# Patient Record
Sex: FEMALE | Race: WHITE | Employment: FULL TIME | URBAN - METROPOLITAN AREA
[De-identification: names, ages, dates, MRNs, and addresses within clinical notes are randomized per-mention and may not be internally consistent; named-entity substitution may affect disease eponyms.]

---

## 2017-02-08 RX ORDER — NORETHINDRONE AND ETHINYL ESTRADIOL 7-9-5
KIT ORAL
Qty: 28 TABLET | Refills: 0 | Status: SHIPPED | OUTPATIENT
Start: 2017-02-08 | End: 2017-02-27 | Stop reason: SDUPTHER

## 2017-02-27 ENCOUNTER — OFFICE VISIT (OUTPATIENT)
Dept: FAMILY MEDICINE CLINIC | Age: 22
End: 2017-02-27

## 2017-02-27 VITALS
RESPIRATION RATE: 16 BRPM | TEMPERATURE: 98 F | BODY MASS INDEX: 30.57 KG/M2 | WEIGHT: 206.4 LBS | SYSTOLIC BLOOD PRESSURE: 110 MMHG | HEIGHT: 69 IN | HEART RATE: 84 BPM | DIASTOLIC BLOOD PRESSURE: 60 MMHG

## 2017-02-27 DIAGNOSIS — Z00.00 ANNUAL PHYSICAL EXAM: Primary | ICD-10-CM

## 2017-02-27 DIAGNOSIS — Z30.41 ORAL CONTRACEPTIVE USE: ICD-10-CM

## 2017-02-27 DIAGNOSIS — E66.09 NON MORBID OBESITY DUE TO EXCESS CALORIES: ICD-10-CM

## 2017-02-27 PROCEDURE — 99395 PREV VISIT EST AGE 18-39: CPT | Performed by: NURSE PRACTITIONER

## 2017-02-27 RX ORDER — IBUPROFEN 200 MG
200 TABLET ORAL EVERY 6 HOURS PRN
COMMUNITY
End: 2022-07-07

## 2017-02-27 ASSESSMENT — ENCOUNTER SYMPTOMS
TROUBLE SWALLOWING: 0
ANAL BLEEDING: 0
DIARRHEA: 0
ABDOMINAL PAIN: 0
COUGH: 0
VOICE CHANGE: 0
VOMITING: 0
APNEA: 0
WHEEZING: 0
BACK PAIN: 0
CONSTIPATION: 0
ABDOMINAL DISTENTION: 0
BLOOD IN STOOL: 0
NAUSEA: 0
PHOTOPHOBIA: 0
SHORTNESS OF BREATH: 0

## 2017-03-23 ENCOUNTER — TELEPHONE (OUTPATIENT)
Dept: FAMILY MEDICINE CLINIC | Age: 22
End: 2017-03-23

## 2017-03-27 ENCOUNTER — NURSE ONLY (OUTPATIENT)
Dept: FAMILY MEDICINE CLINIC | Age: 22
End: 2017-03-27

## 2017-03-27 DIAGNOSIS — Z23 NEED FOR MENINGOCOCCAL VACCINATION: ICD-10-CM

## 2017-03-27 DIAGNOSIS — Z11.1 VISIT FOR MANTOUX TEST: Primary | ICD-10-CM

## 2017-03-27 PROCEDURE — 90471 IMMUNIZATION ADMIN: CPT | Performed by: FAMILY MEDICINE

## 2017-03-27 PROCEDURE — 90734 MENACWYD/MENACWYCRM VACC IM: CPT | Performed by: FAMILY MEDICINE

## 2017-03-27 PROCEDURE — 86580 TB INTRADERMAL TEST: CPT | Performed by: FAMILY MEDICINE

## 2017-03-29 ENCOUNTER — NURSE ONLY (OUTPATIENT)
Dept: FAMILY MEDICINE CLINIC | Age: 22
End: 2017-03-29

## 2017-03-29 DIAGNOSIS — Z11.1 ENCOUNTER FOR READING OF TUBERCULIN SKIN TEST: Primary | ICD-10-CM

## 2017-03-29 LAB
INDURATION: 0
TB SKIN TEST: NEGATIVE

## 2017-04-03 ENCOUNTER — NURSE ONLY (OUTPATIENT)
Dept: FAMILY MEDICINE CLINIC | Age: 22
End: 2017-04-03

## 2017-04-03 DIAGNOSIS — Z11.1 PPD SCREENING TEST: Primary | ICD-10-CM

## 2017-04-03 PROCEDURE — 86580 TB INTRADERMAL TEST: CPT | Performed by: FAMILY MEDICINE

## 2017-04-05 ENCOUNTER — NURSE ONLY (OUTPATIENT)
Dept: FAMILY MEDICINE CLINIC | Age: 22
End: 2017-04-05

## 2017-04-05 DIAGNOSIS — Z11.1 ENCOUNTER FOR PPD SKIN TEST READING: Primary | ICD-10-CM

## 2017-04-05 LAB
INDURATION: NORMAL
TB SKIN TEST: NEGATIVE

## 2017-05-30 ENCOUNTER — TELEPHONE (OUTPATIENT)
Dept: FAMILY MEDICINE CLINIC | Age: 22
End: 2017-05-30

## 2017-05-30 ENCOUNTER — OFFICE VISIT (OUTPATIENT)
Dept: FAMILY MEDICINE CLINIC | Age: 22
End: 2017-05-30

## 2017-05-30 VITALS
SYSTOLIC BLOOD PRESSURE: 120 MMHG | TEMPERATURE: 98.2 F | WEIGHT: 204 LBS | RESPIRATION RATE: 16 BRPM | BODY MASS INDEX: 30.13 KG/M2 | DIASTOLIC BLOOD PRESSURE: 70 MMHG | HEART RATE: 84 BPM

## 2017-05-30 DIAGNOSIS — L25.9 CONTACT DERMATITIS, UNSPECIFIED CONTACT DERMATITIS TYPE, UNSPECIFIED TRIGGER: Primary | ICD-10-CM

## 2017-05-30 PROCEDURE — 99213 OFFICE O/P EST LOW 20 MIN: CPT | Performed by: FAMILY MEDICINE

## 2017-05-30 RX ORDER — PREDNISONE 10 MG/1
TABLET ORAL
Qty: 30 TABLET | Refills: 0 | Status: SHIPPED | OUTPATIENT
Start: 2017-05-30 | End: 2017-06-09

## 2017-05-30 ASSESSMENT — ENCOUNTER SYMPTOMS
CONSTIPATION: 0
VOMITING: 0
ANAL BLEEDING: 0
SHORTNESS OF BREATH: 0
ABDOMINAL PAIN: 0
DIARRHEA: 0
CHEST TIGHTNESS: 0
BLOOD IN STOOL: 0
NAUSEA: 0

## 2017-05-30 ASSESSMENT — PATIENT HEALTH QUESTIONNAIRE - PHQ9
SUM OF ALL RESPONSES TO PHQ9 QUESTIONS 1 & 2: 0
SUM OF ALL RESPONSES TO PHQ QUESTIONS 1-9: 0
1. LITTLE INTEREST OR PLEASURE IN DOING THINGS: 0
2. FEELING DOWN, DEPRESSED OR HOPELESS: 0

## 2017-07-19 ENCOUNTER — OFFICE VISIT (OUTPATIENT)
Dept: FAMILY MEDICINE CLINIC | Age: 22
End: 2017-07-19
Payer: COMMERCIAL

## 2017-07-19 VITALS
SYSTOLIC BLOOD PRESSURE: 104 MMHG | RESPIRATION RATE: 16 BRPM | HEART RATE: 76 BPM | HEIGHT: 68 IN | WEIGHT: 202 LBS | TEMPERATURE: 98.7 F | DIASTOLIC BLOOD PRESSURE: 66 MMHG | BODY MASS INDEX: 30.62 KG/M2

## 2017-07-19 DIAGNOSIS — Z11.4 SCREENING FOR HIV (HUMAN IMMUNODEFICIENCY VIRUS): ICD-10-CM

## 2017-07-19 DIAGNOSIS — Z00.00 WELL ADULT EXAM: Primary | ICD-10-CM

## 2017-07-19 DIAGNOSIS — Z01.84 IMMUNITY STATUS TESTING: ICD-10-CM

## 2017-07-19 PROBLEM — E66.3 OVERWEIGHT (BMI 25.0-29.9): Status: ACTIVE | Noted: 2017-07-19

## 2017-07-19 PROCEDURE — 99395 PREV VISIT EST AGE 18-39: CPT | Performed by: NURSE PRACTITIONER

## 2017-07-19 ASSESSMENT — ENCOUNTER SYMPTOMS
APNEA: 0
ANAL BLEEDING: 0
BACK PAIN: 0
ABDOMINAL PAIN: 0
BLOOD IN STOOL: 0
TROUBLE SWALLOWING: 0
VOMITING: 0
DIARRHEA: 0
VOICE CHANGE: 0
PHOTOPHOBIA: 0
SHORTNESS OF BREATH: 0
NAUSEA: 0
WHEEZING: 0
CONSTIPATION: 0
COUGH: 0
ABDOMINAL DISTENTION: 0

## 2017-07-20 LAB — HIV 1,2 COMBO ANTIGEN/ANTIBODY: NORMAL

## 2017-07-21 LAB — VZV IGG SER QL IA: >2000 INDEX

## 2017-10-09 ENCOUNTER — NURSE ONLY (OUTPATIENT)
Dept: FAMILY MEDICINE CLINIC | Age: 22
End: 2017-10-09
Payer: COMMERCIAL

## 2017-10-09 DIAGNOSIS — Z23 NEED FOR INFLUENZA VACCINATION: Primary | ICD-10-CM

## 2017-10-09 PROCEDURE — 90471 IMMUNIZATION ADMIN: CPT | Performed by: FAMILY MEDICINE

## 2017-10-09 PROCEDURE — 90688 IIV4 VACCINE SPLT 0.5 ML IM: CPT | Performed by: FAMILY MEDICINE

## 2017-10-09 NOTE — PROGRESS NOTES
After obtaining consent, and per orders of Pavel Castro CNP, injection of Fluzone 0.5 ml given IM left deltoid by Radhika Cobb. Patient tolerated well and left after injection.

## 2018-02-07 DIAGNOSIS — Z30.41 ORAL CONTRACEPTIVE USE: ICD-10-CM

## 2018-02-07 NOTE — TELEPHONE ENCOUNTER
I left a detailed message ok per HIPAA and notified patient of Rx at the pharmacy and ES response/recommendations. Notified of phone hours.

## 2018-06-04 ENCOUNTER — TELEPHONE (OUTPATIENT)
Dept: FAMILY MEDICINE CLINIC | Age: 23
End: 2018-06-04

## 2018-06-05 ENCOUNTER — HOSPITAL ENCOUNTER (EMERGENCY)
Age: 23
Discharge: HOME | End: 2018-06-05
Payer: COMMERCIAL

## 2018-06-05 VITALS
HEART RATE: 74 BPM | DIASTOLIC BLOOD PRESSURE: 90 MMHG | RESPIRATION RATE: 18 BRPM | TEMPERATURE: 98.3 F | BODY MASS INDEX: 28.63 KG/M2 | OXYGEN SATURATION: 98 % | WEIGHT: 200 LBS | SYSTOLIC BLOOD PRESSURE: 141 MMHG | HEIGHT: 70 IN

## 2018-06-05 DIAGNOSIS — N76.0 ACUTE VAGINITIS: Primary | ICD-10-CM

## 2018-06-05 LAB
CHLAMYDIA TRACHOMATIS BY RT-PCR: NOT DETECTED
CT/NG SOURCE: NORMAL
NEISSERIA GONORRHOEAE BY RT-PCR: NOT DETECTED
TRICHOMONAS PREP: NEGATIVE

## 2018-06-05 PROCEDURE — 87491 CHLMYD TRACH DNA AMP PROBE: CPT

## 2018-06-05 PROCEDURE — 99213 OFFICE O/P EST LOW 20 MIN: CPT | Performed by: NURSE PRACTITIONER

## 2018-06-05 PROCEDURE — 87070 CULTURE OTHR SPECIMN AEROBIC: CPT

## 2018-06-05 PROCEDURE — 87591 N.GONORRHOEAE DNA AMP PROB: CPT

## 2018-06-05 PROCEDURE — 87205 SMEAR GRAM STAIN: CPT

## 2018-06-05 PROCEDURE — 99213 OFFICE O/P EST LOW 20 MIN: CPT

## 2018-06-05 PROCEDURE — 87808 TRICHOMONAS ASSAY W/OPTIC: CPT

## 2018-06-05 RX ORDER — FLUCONAZOLE 150 MG/1
150 TABLET ORAL ONCE
Qty: 1 TABLET | Refills: 0 | Status: SHIPPED | OUTPATIENT
Start: 2018-06-05 | End: 2018-06-05

## 2018-06-05 ASSESSMENT — ENCOUNTER SYMPTOMS
NAUSEA: 0
VOMITING: 0
ABDOMINAL PAIN: 0

## 2018-06-08 LAB
GENITAL CULTURE, ROUTINE: ABNORMAL
GENITAL CULTURE, ROUTINE: ABNORMAL
GRAM STAIN RESULT: ABNORMAL
ORGANISM: ABNORMAL

## 2018-06-28 DIAGNOSIS — Z30.41 ORAL CONTRACEPTIVE USE: ICD-10-CM

## 2018-06-28 RX ORDER — NORETHINDRONE AND ETHINYL ESTRADIOL 7-9-5
KIT ORAL
Qty: 28 TABLET | Refills: 0 | Status: SHIPPED | OUTPATIENT
Start: 2018-06-28 | End: 2018-07-02 | Stop reason: SDUPTHER

## 2018-07-02 ENCOUNTER — OFFICE VISIT (OUTPATIENT)
Dept: FAMILY MEDICINE CLINIC | Age: 23
End: 2018-07-02
Payer: COMMERCIAL

## 2018-07-02 VITALS
SYSTOLIC BLOOD PRESSURE: 136 MMHG | DIASTOLIC BLOOD PRESSURE: 82 MMHG | BODY MASS INDEX: 30.98 KG/M2 | HEART RATE: 80 BPM | TEMPERATURE: 98.5 F | HEIGHT: 70 IN | WEIGHT: 216.4 LBS | RESPIRATION RATE: 16 BRPM

## 2018-07-02 DIAGNOSIS — Z00.00 ENCOUNTER FOR WELLNESS EXAMINATION: Primary | ICD-10-CM

## 2018-07-02 DIAGNOSIS — Z30.41 ORAL CONTRACEPTIVE USE: ICD-10-CM

## 2018-07-02 PROCEDURE — 99395 PREV VISIT EST AGE 18-39: CPT | Performed by: NURSE PRACTITIONER

## 2018-07-02 ASSESSMENT — ENCOUNTER SYMPTOMS
ABDOMINAL PAIN: 0
SORE THROAT: 0
CONSTIPATION: 0
SHORTNESS OF BREATH: 0
DIARRHEA: 0
WHEEZING: 0
COUGH: 0
NAUSEA: 0

## 2018-07-02 ASSESSMENT — PATIENT HEALTH QUESTIONNAIRE - PHQ9
2. FEELING DOWN, DEPRESSED OR HOPELESS: 0
SUM OF ALL RESPONSES TO PHQ QUESTIONS 1-9: 0
1. LITTLE INTEREST OR PLEASURE IN DOING THINGS: 0
SUM OF ALL RESPONSES TO PHQ9 QUESTIONS 1 & 2: 0

## 2018-07-02 NOTE — PATIENT INSTRUCTIONS
are the disadvantages of combination pills? · You have to take a pill at the same time every day to prevent pregnancy. · Combination pills don't protect against sexually transmitted infections (STIs), such as herpes or HIV/AIDS. If you aren't sure if your sex partner might have an STI, use a condom to protect against disease. · They may cause changes in your period. You may have little bleeding, skipped periods, or spotting. · They may cause mood changes, less interest in sex, or weight gain. · Combination pills contain estrogen. They may not be right for you if you have certain health problems. Where can you learn more? Go to https://chpepiceweb.health-[email or web address]. org and sign in to your [employer] account. Enter G610 in the [employer] box to learn more about \"Learning About Birth Control: Combination Pills. \"     If you do not have an account, please click on the \"Sign Up Now\" link. Current as of: November 21, 2017  Content Version: 11.6  © 5505-0726 [employer]. Care instructions adapted under license by [facility] ([facility]). If you have questions about a medical condition or this instruction, always ask your healthcare professional. [place] [address] any warranty or liability for your use of this information. Patient Education        Well Visit, Ages 25 to 48: Care Instructions  Your Care Instructions    Physical exams can help you stay healthy. Your doctor has checked your overall health and may have suggested ways to take good care of yourself. He or she also may have recommended tests. At home, you can help prevent illness with healthy eating, regular exercise, and other steps. Follow-up care is a key part of your treatment and safety. Be sure to make and go to all appointments, and call your doctor if you are having problems. It's also a good idea to know your test results and keep a list of the medicines you take. How can you care for yourself at home?   · Reach for help? Watch closely for changes in your health, and be sure to contact your doctor if you have any problems or symptoms that concern you. Where can you learn more? Go to [email or web address] org and sign in to your [employer] account. Enter P072 in the [employer] box to learn more about \"Well Visit, Ages 25 to 48: Care Instructions. \"     If you do not have an account, please click on the \"Sign Up Now\" link. Current as of: May 16, 2017  Content Version: 11.6  © 0588-0993 [employer]. Care instructions adapted under license by [facility] ([facility]). If you have questions about a medical condition or this instruction, always ask your healthcare professional. [address] any warranty or liability for your use of this information.

## 2018-07-02 NOTE — PROGRESS NOTES
[facility]  [facility]  Dept: [phone]  Dept Fax: [phone]    DERRICK Montgomery is a 23 y. o.female here for annual exam and contraceptive refill. She was at the urgent care about 3 weeks ago for an episode of vaginitis which was treated. She states she has completely cleared her symptoms. 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 Her relationships are good, she is positive and hopeful about good employment after school. Pt feeling ok since last visit- no new complaints, issues, or problems, except as noted below:     Patient Active Problem List   Diagnosis    Overweight (BMI 25.0-29. 9)       Current Outpatient Prescriptions   Medication Sig Dispense Refill    Norethin-Eth Estrad Triphasic (BILLY) 0.5/1/0.5-35 MG-MCG TABS TAKE 1 TABLET BY MOUTH EVERY DAY 28 tablet 11    ibuprofen (ADVIL;MOTRIN) 200 MG tablet Take 200 mg by mouth every 6 hours as needed for Pain       No current facility-administered medications for this visit. Review of Systems   Constitutional: Negative for appetite change, diaphoresis, fatigue and fever. HENT: Negative for congestion, sore throat and tinnitus. Eyes: Negative for visual disturbance. Respiratory: Negative for cough, shortness of breath and wheezing. Cardiovascular: Negative for chest pain and leg swelling. Gastrointestinal: Negative for abdominal pain, constipation, diarrhea and nausea. Genitourinary: Negative for frequency. Musculoskeletal: Negative for arthralgias, myalgias and neck stiffness. Skin: Negative for rash. Neurological: Negative for dizziness, weakness and headaches. Psychiatric/Behavioral: The patient is not nervous/anxious. All other systems reviewed and are negative.           OBJECTIVE     /82 (Site: Left Arm, Position: Sitting, Cuff Size: Medium Adult)   Pulse 80   Temp 98.5 °F (36.9 °C) (Oral)   Resp 16   Ht 5' 10\" (1.778 m) Wt 216 lb 6.4 oz (98.2 kg)   LMP 06/27/2018   BMI 31.05 kg/m²     Wt Readings from Last 3 Encounters:   07/02/18 216 lb 6.4 oz (98.2 kg)   06/05/18 200 lb (90.7 kg)   07/19/17 202 lb (91.6 kg) (98 %, Z= 1.97)*     * Growth percentiles are based on [facility] 2-20 Years data. Body mass index is 31.05 kg/m². Physical Exam   Constitutional: She is oriented to person, place, and time. She appears well-developed and well-nourished. She is cooperative. No distress. HENT:   Right Ear: Hearing, tympanic membrane, external ear and ear canal normal.   Left Ear: Hearing, tympanic membrane, external ear and ear canal normal.   Nose: Nose normal. No mucosal edema or rhinorrhea. Mouth/Throat: Uvula is midline, oropharynx is clear and moist and mucous membranes are normal. No uvula swelling. No oropharyngeal exudate, posterior oropharyngeal edema or posterior oropharyngeal erythema. Eyes: Conjunctivae, EOM and lids are normal. Pupils are equal, round, and reactive to light. Right eye exhibits no discharge. Left eye exhibits no discharge. Neck: Full passive range of motion without pain. No muscular tenderness present. Cardiovascular: Normal rate, regular rhythm and normal heart sounds. Pulmonary/Chest: Effort normal and breath sounds normal. No accessory muscle usage. No respiratory distress. She has no wheezes. She has no rales. Lymphadenopathy:     She has no cervical adenopathy. Neurological: She is alert and oriented to person, place, and time. She has normal strength. No cranial nerve deficit. Coordination and gait normal. GCS eye subscore is 4. GCS verbal subscore is 5. GCS motor subscore is 6. Skin: Skin is warm and dry. She is not diaphoretic. Psychiatric: She has a normal mood and affect. Her speech is normal and behavior is normal. Judgment and thought content normal. Cognition and memory are normal.   Nursing note and vitals reviewed.       No results found for: LABA1C    No results found for: Center   7/2/2018 4:00 PM ALEXANDRE Cloud CNP [facility] [address] [facility]       ASSESSMENT      Patient exam normal today. Declines Pap smear today offered as she is 2 months from her 23st birthday. She states she will come back later for that. Diagnosis Orders   1. Encounter for wellness examination     2. Oral contraceptive use  Norethin-Eth Estrad Triphasic (BILLY) 0.5/1/0.5-35 MG-MCG TABS       PLAN      1. Prescription for birth control pills refilled. 2.  Return in 3 months for pelvic exam and Pap smear.     Electronically signed by ALEXANDRE Cloud CNP on 7/2/2018 at 3:27 PM

## 2018-07-28 DIAGNOSIS — Z30.41 ORAL CONTRACEPTIVE USE: ICD-10-CM

## 2018-07-30 RX ORDER — NORETHINDRONE AND ETHINYL ESTRADIOL 7-9-5
KIT ORAL
Qty: 84 TABLET | Refills: 0 | OUTPATIENT
Start: 2018-07-30

## 2019-01-08 ENCOUNTER — OFFICE VISIT (OUTPATIENT)
Dept: FAMILY MEDICINE CLINIC | Age: 24
End: 2019-01-08
Payer: COMMERCIAL

## 2019-01-08 VITALS
SYSTOLIC BLOOD PRESSURE: 112 MMHG | RESPIRATION RATE: 16 BRPM | WEIGHT: 207 LBS | HEART RATE: 72 BPM | HEIGHT: 69 IN | BODY MASS INDEX: 30.66 KG/M2 | TEMPERATURE: 98.6 F | DIASTOLIC BLOOD PRESSURE: 82 MMHG

## 2019-01-08 DIAGNOSIS — K52.9 CHRONIC DIARRHEA: Primary | ICD-10-CM

## 2019-01-08 PROCEDURE — 99213 OFFICE O/P EST LOW 20 MIN: CPT | Performed by: NURSE PRACTITIONER

## 2019-01-08 ASSESSMENT — ENCOUNTER SYMPTOMS
CONSTIPATION: 0
COUGH: 0
WHEEZING: 0
DIARRHEA: 1
SORE THROAT: 0
ABDOMINAL PAIN: 1
NAUSEA: 0
SHORTNESS OF BREATH: 0

## 2019-02-08 ENCOUNTER — TELEPHONE (OUTPATIENT)
Dept: FAMILY MEDICINE CLINIC | Age: 24
End: 2019-02-08

## 2019-04-12 ENCOUNTER — HOSPITAL ENCOUNTER (OUTPATIENT)
Dept: NUCLEAR MEDICINE | Age: 24
Discharge: HOME | End: 2019-04-12
Payer: COMMERCIAL

## 2019-04-12 ENCOUNTER — HOSPITAL ENCOUNTER (OUTPATIENT)
Dept: ULTRASOUND IMAGING | Age: 24
Discharge: HOME | End: 2019-04-12
Payer: COMMERCIAL

## 2019-04-12 VITALS — WEIGHT: 200 LBS | BODY MASS INDEX: 28.63 KG/M2 | HEIGHT: 70 IN

## 2019-04-12 DIAGNOSIS — R10.84 ABDOMINAL PAIN, GENERALIZED: ICD-10-CM

## 2019-04-12 DIAGNOSIS — R10.11 RIGHT UPPER QUADRANT ABDOMINAL PAIN: ICD-10-CM

## 2019-04-12 DIAGNOSIS — R19.7 DIARRHEA, UNSPECIFIED TYPE: ICD-10-CM

## 2019-04-12 PROCEDURE — 6360000004 HC RX CONTRAST MEDICATION: Performed by: RADIOLOGY

## 2019-04-12 PROCEDURE — 76705 ECHO EXAM OF ABDOMEN: CPT

## 2019-04-12 PROCEDURE — 3430000000 HC RX DIAGNOSTIC RADIOPHARMACEUTICAL: Performed by: NURSE PRACTITIONER

## 2019-04-12 PROCEDURE — 2709999900 HC NON-CHARGEABLE SUPPLY

## 2019-04-12 PROCEDURE — 2580000003 HC RX 258: Performed by: RADIOLOGY

## 2019-04-12 PROCEDURE — A9537 TC99M MEBROFENIN: HCPCS | Performed by: NURSE PRACTITIONER

## 2019-04-12 PROCEDURE — 78227 HEPATOBIL SYST IMAGE W/DRUG: CPT

## 2019-04-12 RX ADMIN — Medication 8.5 MILLICURIE: at 09:07

## 2019-04-12 RX ADMIN — SODIUM CHLORIDE 1.81 MCG: 9 INJECTION, SOLUTION INTRAVENOUS at 10:07

## 2019-06-26 DIAGNOSIS — Z30.41 ORAL CONTRACEPTIVE USE: ICD-10-CM

## 2019-12-14 DIAGNOSIS — Z30.41 ORAL CONTRACEPTIVE USE: ICD-10-CM

## 2019-12-16 DIAGNOSIS — Z30.41 ORAL CONTRACEPTIVE USE: ICD-10-CM

## 2020-01-14 NOTE — TELEPHONE ENCOUNTER
Julio Cesar Thakkar calls confused about her medication. She said she usually gets this from her pharmacy to start on Monday but the last 3 months they never have it ready for her until Tuesdays so she is always a day behind taking her birth control. She is asking if the refill they requested can be submitted because she is out and then she did make an appointment with Pat Baxter for Monday 1/20/20 to review everything. Please advise.     DOLWILLI  1/8/19 (MyChart visit)  RAVEN  1/20/20 Emily Montgomery    [place] [facility] [address]

## 2020-01-20 ENCOUNTER — OFFICE VISIT (OUTPATIENT)
Dept: FAMILY MEDICINE CLINIC | Age: 25
End: 2020-01-20
Payer: COMMERCIAL

## 2020-01-20 VITALS
WEIGHT: 180 LBS | RESPIRATION RATE: 16 BRPM | HEIGHT: 69 IN | HEART RATE: 64 BPM | TEMPERATURE: 98.2 F | BODY MASS INDEX: 26.66 KG/M2 | SYSTOLIC BLOOD PRESSURE: 104 MMHG | DIASTOLIC BLOOD PRESSURE: 80 MMHG

## 2020-01-20 PROCEDURE — 99213 OFFICE O/P EST LOW 20 MIN: CPT | Performed by: NURSE PRACTITIONER

## 2020-01-20 ASSESSMENT — ENCOUNTER SYMPTOMS
SHORTNESS OF BREATH: 0
BLOOD IN STOOL: 0
NAUSEA: 0
VOMITING: 0
DIARRHEA: 0
CONSTIPATION: 0

## 2020-01-20 ASSESSMENT — PATIENT HEALTH QUESTIONNAIRE - PHQ9
1. LITTLE INTEREST OR PLEASURE IN DOING THINGS: 0
2. FEELING DOWN, DEPRESSED OR HOPELESS: 0
SUM OF ALL RESPONSES TO PHQ QUESTIONS 1-9: 0
SUM OF ALL RESPONSES TO PHQ9 QUESTIONS 1 & 2: 0
SUM OF ALL RESPONSES TO PHQ QUESTIONS 1-9: 0

## 2020-01-20 NOTE — PROGRESS NOTES
Chief Complaint   Patient presents with    Medication Refill     Here today for refill of birth control. No concerns. Jessica Cantu is a 27 y. o.female      Pt is here today for birth control refill. States she is doing well with her current birth control pill and denies any concerns. Cycles are regular with bleeding about 4 days. Denies any cramping. Follows with obgyn for pap/pelvic per patient. Review of Systems   Constitutional: Negative for chills, diaphoresis and fever. Respiratory: Negative for shortness of breath. Cardiovascular: Negative for chest pain, palpitations and leg swelling. Gastrointestinal: Negative for blood in stool, constipation, diarrhea, nausea and vomiting. Genitourinary: Negative for dysuria and hematuria. Musculoskeletal: Negative for myalgias. Neurological: Negative for dizziness and headaches. All other systems reviewed and are negative. OBJECTIVE     /80 (Site: Left Upper Arm)   Pulse 64   Temp 98.2 °F (36.8 °C) (Oral)   Resp 16   Ht 5' 9\" (1.753 m)   Wt 180 lb (81.6 kg)   BMI 26.58 kg/m²     Physical Exam  Vitals signs and nursing note reviewed. Constitutional:       Appearance: She is well-developed. HENT:      Head: Normocephalic and atraumatic. Right Ear: External ear normal.      Left Ear: External ear normal.      Nose: Nose normal.   Eyes:      Conjunctiva/sclera: Conjunctivae normal.      Pupils: Pupils are equal, round, and reactive to light. Neck:      Musculoskeletal: Normal range of motion and neck supple. Cardiovascular:      Rate and Rhythm: Normal rate and regular rhythm. Heart sounds: Normal heart sounds. Pulmonary:      Effort: Pulmonary effort is normal.      Breath sounds: Normal breath sounds. Abdominal:      General: Bowel sounds are normal.      Palpations: Abdomen is soft. Musculoskeletal: Normal range of motion. Skin:     General: Skin is warm and dry.    Neurological: Mental Status: She is alert and oriented to person, place, and time. Deep Tendon Reflexes: Reflexes are normal and symmetric. Psychiatric:         Behavior: Behavior normal.         Thought Content: Thought content normal.         Judgment: Judgment normal.           No results found for this visit on 01/20/20. ASSESSMENT      1. Oral contraceptive use        PLAN     Requested Prescriptions     Signed Prescriptions Disp Refills    Norethin-Eth Estrad Triphasic (ARANELLE) 0.5/1/0.5-35 MG-MCG TABS 28 tablet 11     Sig: TAKE 1 TABLET BY MOUTH EVERY DAY     - Follows with Blair Mcintyre for obgyn - Pt to have appt for pelvic. States last pap was completed around her 23st birthday.   - Follow up annually and prn    No orders of the defined types were placed in this encounter.         Electronically signed by ALEXANDRE Loev CNP on 1/20/2020 at 9:18 AM

## 2021-02-14 ENCOUNTER — ANESTHESIA EVENT (OUTPATIENT)
Dept: OPERATING ROOM | Age: 26
End: 2021-02-14
Payer: COMMERCIAL

## 2021-02-14 ENCOUNTER — APPOINTMENT (OUTPATIENT)
Dept: CT IMAGING | Age: 26
End: 2021-02-14
Payer: COMMERCIAL

## 2021-02-14 ENCOUNTER — HOSPITAL ENCOUNTER (OUTPATIENT)
Age: 26
Discharge: HOME | End: 2021-02-14
Attending: OBSTETRICS & GYNECOLOGY | Admitting: OBSTETRICS & GYNECOLOGY
Payer: COMMERCIAL

## 2021-02-14 ENCOUNTER — ANESTHESIA (OUTPATIENT)
Dept: OPERATING ROOM | Age: 26
End: 2021-02-14
Payer: COMMERCIAL

## 2021-02-14 ENCOUNTER — APPOINTMENT (OUTPATIENT)
Dept: ULTRASOUND IMAGING | Age: 26
End: 2021-02-14
Payer: COMMERCIAL

## 2021-02-14 VITALS
HEIGHT: 70 IN | SYSTOLIC BLOOD PRESSURE: 119 MMHG | TEMPERATURE: 98.6 F | BODY MASS INDEX: 27.2 KG/M2 | OXYGEN SATURATION: 99 % | DIASTOLIC BLOOD PRESSURE: 78 MMHG | WEIGHT: 190 LBS | HEART RATE: 83 BPM | RESPIRATION RATE: 16 BRPM

## 2021-02-14 VITALS — DIASTOLIC BLOOD PRESSURE: 57 MMHG | OXYGEN SATURATION: 100 % | TEMPERATURE: 97.5 F | SYSTOLIC BLOOD PRESSURE: 99 MMHG

## 2021-02-14 DIAGNOSIS — Z98.890 S/P LAPAROSCOPY: ICD-10-CM

## 2021-02-14 DIAGNOSIS — R10.2 PELVIC PAIN: Primary | ICD-10-CM

## 2021-02-14 PROBLEM — R10.32 LEFT LOWER QUADRANT PAIN: Status: ACTIVE | Noted: 2021-02-14

## 2021-02-14 PROBLEM — N83.519 OVARIAN TORSION: Status: ACTIVE | Noted: 2021-02-14

## 2021-02-14 PROBLEM — N83.202 LEFT OVARIAN CYST: Status: ACTIVE | Noted: 2021-02-14

## 2021-02-14 LAB
ALBUMIN SERPL-MCNC: 4 G/DL (ref 3.5–5.1)
ALP BLD-CCNC: 56 U/L (ref 38–126)
ALT SERPL-CCNC: 18 U/L (ref 11–66)
ANION GAP SERPL CALCULATED.3IONS-SCNC: 7 MEQ/L (ref 8–16)
AST SERPL-CCNC: 22 U/L (ref 5–40)
BASOPHILS # BLD: 0.9 %
BASOPHILS ABSOLUTE: 0.1 THOU/MM3 (ref 0–0.1)
BILIRUB SERPL-MCNC: 0.4 MG/DL (ref 0.3–1.2)
BILIRUBIN DIRECT: < 0.2 MG/DL (ref 0–0.3)
BILIRUBIN URINE: NEGATIVE
BLOOD, URINE: NEGATIVE
BUN BLDV-MCNC: 12 MG/DL (ref 7–22)
CALCIUM SERPL-MCNC: 8.9 MG/DL (ref 8.5–10.5)
CHARACTER, URINE: CLEAR
CHLORIDE BLD-SCNC: 105 MEQ/L (ref 98–111)
CO2: 23 MEQ/L (ref 23–33)
COLOR: YELLOW
CREAT SERPL-MCNC: 0.8 MG/DL (ref 0.4–1.2)
EOSINOPHIL # BLD: 2.5 %
EOSINOPHILS ABSOLUTE: 0.2 THOU/MM3 (ref 0–0.4)
ERYTHROCYTE [DISTWIDTH] IN BLOOD BY AUTOMATED COUNT: 12.6 % (ref 11.5–14.5)
ERYTHROCYTE [DISTWIDTH] IN BLOOD BY AUTOMATED COUNT: 40 FL (ref 35–45)
GFR SERPL CREATININE-BSD FRML MDRD: 89 ML/MIN/1.73M2
GLUCOSE BLD-MCNC: 86 MG/DL (ref 70–108)
GLUCOSE URINE: NEGATIVE MG/DL
HCT VFR BLD CALC: 42.4 % (ref 37–47)
HEMOGLOBIN: 14.6 GM/DL (ref 12–16)
IMMATURE GRANS (ABS): 0.03 THOU/MM3 (ref 0–0.07)
IMMATURE GRANULOCYTES: 0.4 %
KETONES, URINE: NEGATIVE
LEUKOCYTE ESTERASE, URINE: NEGATIVE
LIPASE: 38.6 U/L (ref 5.6–51.3)
LYMPHOCYTES # BLD: 23 %
LYMPHOCYTES ABSOLUTE: 1.6 THOU/MM3 (ref 1–4.8)
MAGNESIUM: 2 MG/DL (ref 1.6–2.4)
MCH RBC QN AUTO: 29.9 PG (ref 26–33)
MCHC RBC AUTO-ENTMCNC: 34.4 GM/DL (ref 32.2–35.5)
MCV RBC AUTO: 86.9 FL (ref 81–99)
MONOCYTES # BLD: 6.2 %
MONOCYTES ABSOLUTE: 0.4 THOU/MM3 (ref 0.4–1.3)
NITRITE, URINE: NEGATIVE
NUCLEATED RED BLOOD CELLS: 0 /100 WBC
OSMOLALITY CALCULATION: 269.2 MOSMOL/KG (ref 275–300)
PH UA: 6.5 (ref 5–9)
PLATELET # BLD: 234 THOU/MM3 (ref 130–400)
PMV BLD AUTO: 11 FL (ref 9.4–12.4)
POTASSIUM SERPL-SCNC: 4.7 MEQ/L (ref 3.5–5.2)
PREGNANCY, SERUM: NEGATIVE
PROTEIN UA: NEGATIVE
RBC # BLD: 4.88 MILL/MM3 (ref 4.2–5.4)
SARS-COV-2, NAAT: NOT DETECTED
SEG NEUTROPHILS: 67 %
SEGMENTED NEUTROPHILS ABSOLUTE COUNT: 4.6 THOU/MM3 (ref 1.8–7.7)
SODIUM BLD-SCNC: 135 MEQ/L (ref 135–145)
SPECIFIC GRAVITY, URINE: 1.02 (ref 1–1.03)
TOTAL PROTEIN: 7.4 G/DL (ref 6.1–8)
UROBILINOGEN, URINE: 0.2 EU/DL (ref 0–1)
WBC # BLD: 6.8 THOU/MM3 (ref 4.8–10.8)

## 2021-02-14 PROCEDURE — 2580000003 HC RX 258: Performed by: REGISTERED NURSE

## 2021-02-14 PROCEDURE — 6360000002 HC RX W HCPCS: Performed by: REGISTERED NURSE

## 2021-02-14 PROCEDURE — 3700000000 HC ANESTHESIA ATTENDED CARE: Performed by: OBSTETRICS & GYNECOLOGY

## 2021-02-14 PROCEDURE — 36415 COLL VENOUS BLD VENIPUNCTURE: CPT

## 2021-02-14 PROCEDURE — 94760 N-INVAS EAR/PLS OXIMETRY 1: CPT

## 2021-02-14 PROCEDURE — 74176 CT ABD & PELVIS W/O CONTRAST: CPT

## 2021-02-14 PROCEDURE — 2500000003 HC RX 250 WO HCPCS: Performed by: REGISTERED NURSE

## 2021-02-14 PROCEDURE — 6360000002 HC RX W HCPCS: Performed by: ANESTHESIOLOGY

## 2021-02-14 PROCEDURE — 96374 THER/PROPH/DIAG INJ IV PUSH: CPT

## 2021-02-14 PROCEDURE — 93975 VASCULAR STUDY: CPT

## 2021-02-14 PROCEDURE — 81003 URINALYSIS AUTO W/O SCOPE: CPT

## 2021-02-14 PROCEDURE — 82248 BILIRUBIN DIRECT: CPT

## 2021-02-14 PROCEDURE — 76830 TRANSVAGINAL US NON-OB: CPT

## 2021-02-14 PROCEDURE — 80053 COMPREHEN METABOLIC PANEL: CPT

## 2021-02-14 PROCEDURE — 2580000003 HC RX 258: Performed by: PHYSICIAN ASSISTANT

## 2021-02-14 PROCEDURE — 85025 COMPLETE CBC W/AUTO DIFF WBC: CPT

## 2021-02-14 PROCEDURE — 3700000001 HC ADD 15 MINUTES (ANESTHESIA): Performed by: OBSTETRICS & GYNECOLOGY

## 2021-02-14 PROCEDURE — 2500000003 HC RX 250 WO HCPCS: Performed by: OBSTETRICS & GYNECOLOGY

## 2021-02-14 PROCEDURE — 3600000004 HC SURGERY LEVEL 4 BASE: Performed by: OBSTETRICS & GYNECOLOGY

## 2021-02-14 PROCEDURE — 96375 TX/PRO/DX INJ NEW DRUG ADDON: CPT

## 2021-02-14 PROCEDURE — 6360000002 HC RX W HCPCS: Performed by: PHYSICIAN ASSISTANT

## 2021-02-14 PROCEDURE — 7100000001 HC PACU RECOVERY - ADDTL 15 MIN: Performed by: OBSTETRICS & GYNECOLOGY

## 2021-02-14 PROCEDURE — 83690 ASSAY OF LIPASE: CPT

## 2021-02-14 PROCEDURE — 96376 TX/PRO/DX INJ SAME DRUG ADON: CPT

## 2021-02-14 PROCEDURE — 2709999900 HC NON-CHARGEABLE SUPPLY: Performed by: OBSTETRICS & GYNECOLOGY

## 2021-02-14 PROCEDURE — U0002 COVID-19 LAB TEST NON-CDC: HCPCS

## 2021-02-14 PROCEDURE — 6370000000 HC RX 637 (ALT 250 FOR IP): Performed by: OBSTETRICS & GYNECOLOGY

## 2021-02-14 PROCEDURE — 3600000014 HC SURGERY LEVEL 4 ADDTL 15MIN: Performed by: OBSTETRICS & GYNECOLOGY

## 2021-02-14 PROCEDURE — 99285 EMERGENCY DEPT VISIT HI MDM: CPT

## 2021-02-14 PROCEDURE — 7100000000 HC PACU RECOVERY - FIRST 15 MIN: Performed by: OBSTETRICS & GYNECOLOGY

## 2021-02-14 PROCEDURE — 83735 ASSAY OF MAGNESIUM: CPT

## 2021-02-14 PROCEDURE — 84703 CHORIONIC GONADOTROPIN ASSAY: CPT

## 2021-02-14 RX ORDER — SUCCINYLCHOLINE/SOD CL,ISO/PF 200MG/10ML
SYRINGE (ML) INTRAVENOUS PRN
Status: DISCONTINUED | OUTPATIENT
Start: 2021-02-14 | End: 2021-02-14 | Stop reason: SDUPTHER

## 2021-02-14 RX ORDER — PROMETHAZINE HYDROCHLORIDE 25 MG/ML
12.5 INJECTION, SOLUTION INTRAMUSCULAR; INTRAVENOUS
Status: DISCONTINUED | OUTPATIENT
Start: 2021-02-14 | End: 2021-02-14 | Stop reason: HOSPADM

## 2021-02-14 RX ORDER — PROPOFOL 10 MG/ML
INJECTION, EMULSION INTRAVENOUS PRN
Status: DISCONTINUED | OUTPATIENT
Start: 2021-02-14 | End: 2021-02-14 | Stop reason: SDUPTHER

## 2021-02-14 RX ORDER — SODIUM CHLORIDE 0.9 % (FLUSH) 0.9 %
10 SYRINGE (ML) INJECTION PRN
Status: DISCONTINUED | OUTPATIENT
Start: 2021-02-14 | End: 2021-02-14 | Stop reason: HOSPADM

## 2021-02-14 RX ORDER — FENTANYL CITRATE 50 UG/ML
50 INJECTION, SOLUTION INTRAMUSCULAR; INTRAVENOUS EVERY 5 MIN PRN
Status: DISCONTINUED | OUTPATIENT
Start: 2021-02-14 | End: 2021-02-14 | Stop reason: HOSPADM

## 2021-02-14 RX ORDER — IBUPROFEN 800 MG/1
800 TABLET ORAL EVERY 8 HOURS PRN
Status: DISCONTINUED | OUTPATIENT
Start: 2021-02-14 | End: 2021-02-14 | Stop reason: HOSPADM

## 2021-02-14 RX ORDER — OXYCODONE HYDROCHLORIDE AND ACETAMINOPHEN 5; 325 MG/1; MG/1
2 TABLET ORAL EVERY 4 HOURS PRN
Status: DISCONTINUED | OUTPATIENT
Start: 2021-02-14 | End: 2021-02-14 | Stop reason: HOSPADM

## 2021-02-14 RX ORDER — NEOSTIGMINE METHYLSULFATE 5 MG/5 ML
SYRINGE (ML) INTRAVENOUS PRN
Status: DISCONTINUED | OUTPATIENT
Start: 2021-02-14 | End: 2021-02-14 | Stop reason: SDUPTHER

## 2021-02-14 RX ORDER — KETOROLAC TROMETHAMINE 30 MG/ML
30 INJECTION, SOLUTION INTRAMUSCULAR; INTRAVENOUS ONCE
Status: COMPLETED | OUTPATIENT
Start: 2021-02-14 | End: 2021-02-14

## 2021-02-14 RX ORDER — ONDANSETRON 2 MG/ML
4 INJECTION INTRAMUSCULAR; INTRAVENOUS ONCE
Status: COMPLETED | OUTPATIENT
Start: 2021-02-14 | End: 2021-02-14

## 2021-02-14 RX ORDER — GLYCOPYRROLATE 1 MG/5 ML
SYRINGE (ML) INTRAVENOUS PRN
Status: DISCONTINUED | OUTPATIENT
Start: 2021-02-14 | End: 2021-02-14 | Stop reason: SDUPTHER

## 2021-02-14 RX ORDER — ONDANSETRON 2 MG/ML
INJECTION INTRAMUSCULAR; INTRAVENOUS PRN
Status: DISCONTINUED | OUTPATIENT
Start: 2021-02-14 | End: 2021-02-14 | Stop reason: SDUPTHER

## 2021-02-14 RX ORDER — MORPHINE SULFATE 4 MG/ML
4 INJECTION, SOLUTION INTRAMUSCULAR; INTRAVENOUS ONCE
Status: COMPLETED | OUTPATIENT
Start: 2021-02-14 | End: 2021-02-14

## 2021-02-14 RX ORDER — OXYCODONE HYDROCHLORIDE AND ACETAMINOPHEN 5; 325 MG/1; MG/1
1 TABLET ORAL EVERY 4 HOURS PRN
Status: DISCONTINUED | OUTPATIENT
Start: 2021-02-14 | End: 2021-02-14 | Stop reason: HOSPADM

## 2021-02-14 RX ORDER — LIDOCAINE HCL/PF 100 MG/5ML
SYRINGE (ML) INJECTION PRN
Status: DISCONTINUED | OUTPATIENT
Start: 2021-02-14 | End: 2021-02-14 | Stop reason: SDUPTHER

## 2021-02-14 RX ORDER — HYDROCODONE BITARTRATE AND ACETAMINOPHEN 5; 325 MG/1; MG/1
1 TABLET ORAL EVERY 6 HOURS PRN
Qty: 18 TABLET | Refills: 0 | Status: SHIPPED | OUTPATIENT
Start: 2021-02-14 | End: 2021-02-19

## 2021-02-14 RX ORDER — ROCURONIUM BROMIDE 10 MG/ML
INJECTION, SOLUTION INTRAVENOUS PRN
Status: DISCONTINUED | OUTPATIENT
Start: 2021-02-14 | End: 2021-02-14 | Stop reason: SDUPTHER

## 2021-02-14 RX ORDER — SODIUM CHLORIDE 0.9 % (FLUSH) 0.9 %
10 SYRINGE (ML) INJECTION EVERY 12 HOURS SCHEDULED
Status: DISCONTINUED | OUTPATIENT
Start: 2021-02-14 | End: 2021-02-14 | Stop reason: HOSPADM

## 2021-02-14 RX ORDER — BUPIVACAINE HYDROCHLORIDE 5 MG/ML
INJECTION, SOLUTION EPIDURAL; INTRACAUDAL PRN
Status: DISCONTINUED | OUTPATIENT
Start: 2021-02-14 | End: 2021-02-14 | Stop reason: ALTCHOICE

## 2021-02-14 RX ORDER — DIPHENHYDRAMINE HYDROCHLORIDE 50 MG/ML
INJECTION INTRAMUSCULAR; INTRAVENOUS PRN
Status: DISCONTINUED | OUTPATIENT
Start: 2021-02-14 | End: 2021-02-14 | Stop reason: SDUPTHER

## 2021-02-14 RX ORDER — 0.9 % SODIUM CHLORIDE 0.9 %
1000 INTRAVENOUS SOLUTION INTRAVENOUS ONCE
Status: COMPLETED | OUTPATIENT
Start: 2021-02-14 | End: 2021-02-14

## 2021-02-14 RX ORDER — MIDAZOLAM HYDROCHLORIDE 1 MG/ML
INJECTION INTRAMUSCULAR; INTRAVENOUS PRN
Status: DISCONTINUED | OUTPATIENT
Start: 2021-02-14 | End: 2021-02-14 | Stop reason: SDUPTHER

## 2021-02-14 RX ORDER — SODIUM CHLORIDE, SODIUM LACTATE, POTASSIUM CHLORIDE, CALCIUM CHLORIDE 600; 310; 30; 20 MG/100ML; MG/100ML; MG/100ML; MG/100ML
INJECTION, SOLUTION INTRAVENOUS CONTINUOUS PRN
Status: DISCONTINUED | OUTPATIENT
Start: 2021-02-14 | End: 2021-02-14 | Stop reason: SDUPTHER

## 2021-02-14 RX ORDER — FENTANYL CITRATE 50 UG/ML
INJECTION, SOLUTION INTRAMUSCULAR; INTRAVENOUS PRN
Status: DISCONTINUED | OUTPATIENT
Start: 2021-02-14 | End: 2021-02-14 | Stop reason: SDUPTHER

## 2021-02-14 RX ORDER — LABETALOL 20 MG/4 ML (5 MG/ML) INTRAVENOUS SYRINGE
10 EVERY 10 MIN PRN
Status: DISCONTINUED | OUTPATIENT
Start: 2021-02-14 | End: 2021-02-14 | Stop reason: HOSPADM

## 2021-02-14 RX ORDER — DEXAMETHASONE SODIUM PHOSPHATE 10 MG/ML
INJECTION, EMULSION INTRAMUSCULAR; INTRAVENOUS PRN
Status: DISCONTINUED | OUTPATIENT
Start: 2021-02-14 | End: 2021-02-14 | Stop reason: SDUPTHER

## 2021-02-14 RX ADMIN — ROCURONIUM BROMIDE 5 MG: 10 INJECTION INTRAVENOUS at 15:52

## 2021-02-14 RX ADMIN — Medication 60 MG: at 15:52

## 2021-02-14 RX ADMIN — FENTANYL CITRATE 100 MCG: 50 INJECTION, SOLUTION INTRAMUSCULAR; INTRAVENOUS at 15:50

## 2021-02-14 RX ADMIN — ROCURONIUM BROMIDE 35 MG: 10 INJECTION INTRAVENOUS at 16:00

## 2021-02-14 RX ADMIN — SODIUM CHLORIDE 1000 ML: 9 INJECTION, SOLUTION INTRAVENOUS at 10:12

## 2021-02-14 RX ADMIN — SODIUM CHLORIDE, POTASSIUM CHLORIDE, SODIUM LACTATE AND CALCIUM CHLORIDE: 600; 310; 30; 20 INJECTION, SOLUTION INTRAVENOUS at 15:50

## 2021-02-14 RX ADMIN — MORPHINE SULFATE 4 MG: 4 INJECTION, SOLUTION INTRAMUSCULAR; INTRAVENOUS at 11:55

## 2021-02-14 RX ADMIN — ONDANSETRON 4 MG: 2 INJECTION INTRAMUSCULAR; INTRAVENOUS at 10:12

## 2021-02-14 RX ADMIN — Medication 5 MG: at 16:25

## 2021-02-14 RX ADMIN — DIPHENHYDRAMINE HYDROCHLORIDE 12.5 MG: 50 INJECTION, SOLUTION INTRAMUSCULAR; INTRAVENOUS at 15:56

## 2021-02-14 RX ADMIN — Medication 120 MG: at 15:52

## 2021-02-14 RX ADMIN — ONDANSETRON HYDROCHLORIDE 4 MG: 4 INJECTION, SOLUTION INTRAMUSCULAR; INTRAVENOUS at 15:56

## 2021-02-14 RX ADMIN — FENTANYL CITRATE 50 MCG: 50 INJECTION, SOLUTION INTRAMUSCULAR; INTRAVENOUS at 16:10

## 2021-02-14 RX ADMIN — Medication 0.8 MG: at 16:25

## 2021-02-14 RX ADMIN — FENTANYL CITRATE 50 MCG: 50 INJECTION, SOLUTION INTRAMUSCULAR; INTRAVENOUS at 16:53

## 2021-02-14 RX ADMIN — MORPHINE SULFATE 4 MG: 4 INJECTION, SOLUTION INTRAMUSCULAR; INTRAVENOUS at 14:33

## 2021-02-14 RX ADMIN — DEXAMETHASONE SODIUM PHOSPHATE 8 MG: 10 INJECTION, EMULSION INTRAMUSCULAR; INTRAVENOUS at 15:56

## 2021-02-14 RX ADMIN — PROPOFOL 180 MG: 10 INJECTION, EMULSION INTRAVENOUS at 15:52

## 2021-02-14 RX ADMIN — IBUPROFEN 800 MG: 800 TABLET, FILM COATED ORAL at 18:08

## 2021-02-14 RX ADMIN — KETOROLAC TROMETHAMINE 30 MG: 30 INJECTION, SOLUTION INTRAMUSCULAR; INTRAVENOUS at 10:14

## 2021-02-14 RX ADMIN — MIDAZOLAM HYDROCHLORIDE 2 MG: 1 INJECTION, SOLUTION INTRAMUSCULAR; INTRAVENOUS at 15:50

## 2021-02-14 ASSESSMENT — ENCOUNTER SYMPTOMS
COUGH: 0
NAUSEA: 1
WHEEZING: 0
SORE THROAT: 0
EYE DISCHARGE: 0
BACK PAIN: 0
COLOR CHANGE: 0
ABDOMINAL PAIN: 1
RHINORRHEA: 0
VOMITING: 0
SHORTNESS OF BREATH: 0
DIARRHEA: 0
EYE ITCHING: 0
EYE PAIN: 0

## 2021-02-14 ASSESSMENT — PAIN DESCRIPTION - LOCATION
LOCATION: ABDOMEN
LOCATION: FLANK
LOCATION: FLANK

## 2021-02-14 ASSESSMENT — PAIN DESCRIPTION - PAIN TYPE
TYPE: SURGICAL PAIN
TYPE: ACUTE PAIN
TYPE: SURGICAL PAIN

## 2021-02-14 ASSESSMENT — PULMONARY FUNCTION TESTS
PIF_VALUE: 15
PIF_VALUE: 18
PIF_VALUE: 13
PIF_VALUE: 14
PIF_VALUE: 2
PIF_VALUE: 15
PIF_VALUE: 18
PIF_VALUE: 14
PIF_VALUE: 15
PIF_VALUE: 10
PIF_VALUE: 14
PIF_VALUE: 0
PIF_VALUE: 1
PIF_VALUE: 14
PIF_VALUE: 13
PIF_VALUE: 1
PIF_VALUE: 13
PIF_VALUE: 13
PIF_VALUE: 14
PIF_VALUE: 15
PIF_VALUE: 19
PIF_VALUE: 13
PIF_VALUE: 14
PIF_VALUE: 13
PIF_VALUE: 14

## 2021-02-14 ASSESSMENT — PAIN DESCRIPTION - FREQUENCY
FREQUENCY: CONTINUOUS
FREQUENCY: CONTINUOUS

## 2021-02-14 ASSESSMENT — PAIN DESCRIPTION - ORIENTATION
ORIENTATION: LEFT;LOWER
ORIENTATION: LOWER
ORIENTATION: LEFT

## 2021-02-14 ASSESSMENT — PAIN SCALES - GENERAL
PAINLEVEL_OUTOF10: 5
PAINLEVEL_OUTOF10: 7

## 2021-02-14 ASSESSMENT — PAIN DESCRIPTION - PROGRESSION
CLINICAL_PROGRESSION: GRADUALLY IMPROVING
CLINICAL_PROGRESSION: GRADUALLY IMPROVING

## 2021-02-14 ASSESSMENT — PAIN DESCRIPTION - DESCRIPTORS
DESCRIPTORS: ACHING;CONSTANT

## 2021-02-14 NOTE — BRIEF OP NOTE
Brief Postoperative Note      Patient: Deangelo Mann  YOB: 1995  MRN: [id number]    Date of Procedure: 2/14/2021    Pre-Op Diagnosis: Ovarian Cyst, suspected ovarian torsion vs hemorrhagic cyst    Post-Op Diagnosis: Left ovarian torsion       Procedure(s):  LAPAROSCOPY EXPLORATORY, REVERSAL OF OVARIAN TORSION    Surgeon(s):  Evaristo Christianson MD    Assistant:  * No surgical staff found *    Anesthesia: General    Estimated Blood Loss (mL): 5cc    Complications: None    Specimens:   * No specimens in log *    Implants:  * No implants in log *      Drains: * No LDAs found *    Findings: Left ovarian torsion x1 twist and no necrosis    Op note: [id number]    Electronically signed by Evaristo Christianson MD on 2/14/2021 at 4:35 PM

## 2021-02-14 NOTE — H&P
[address] [place] [address]  History and Physicial      Patient:  Thanh Pond  YOB: 1995  MRN: [id number]     Acct: [de-identified]    HISTORY OF PRESENT ILLNESS:     Thanh Pond is a 23 y.o. female with severe left sided abdominal pain. States it start abruptly this morning and has not improved since and that is why she came in. She had a similar pain back in November that didn't last long and never was evaluated. Nothing seems to make the current pain better or worse. Obstetric History: No obstetric history on file. Past Medical History:        Diagnosis Date    Acne     Dr. Inocencio Olson.  Concussion 10/27/2015    Obesity        Past Surgical History:        Procedure Laterality Date    ANTERIOR CRUCIATE LIGAMENT REPAIR  7/2012    right knee-Dr. Joel Webb    TYMPANOSTOMY TUBE PLACEMENT      age 5    WISDOM TOOTH EXTRACTION Bilateral 5-    Dr. Azalia Mayberry       Medications: Scheduled Meds:  Continuous Infusions:  PRN Meds:. Allergies:  Cefdinir    Social History:    reports that she has never smoked. She has never used smokeless tobacco. She reports that she does not drink alcohol or use drugs.     Family History:       Problem Relation Age of Onset    High Blood Pressure Mother     High Blood Pressure Father     Crohn's Disease Father     Breast Cancer Maternal Grandmother     Heart Disease Maternal Grandmother     Cancer Maternal Grandmother         Breast cancer    Breast Cancer Paternal Grandmother     Cancer Paternal Grandmother 61        Breast cancer    Heart Disease Maternal Grandfather     Kidney Disease Maternal Grandfather     Diabetes Maternal Grandfather     Thyroid Disease Paternal Grandfather     Alzheimer's Disease Paternal Grandfather        Review of Systems:  General ROS: negative  Respiratory ROS: negative  Cardiovascular ROS: negative  Gastrointestinal ROS: negative  Musculoskeletal ROS: negative  Neurological ROS: negative    Physical Exam:    Vitals:  Patient Vitals for the past 24 hrs:   BP Temp Temp src Pulse Resp SpO2 Height Weight   02/14/21 1437 (!) 128/90 -- -- 69 16 100 % -- --   02/14/21 1339 119/85 -- -- 84 16 99 % -- --   02/14/21 1157 131/85 -- -- 83 16 99 % -- --   02/14/21 1110 121/74 -- -- 67 16 100 % -- --   02/14/21 0947 -- -- -- -- -- -- 5' 10\" (1.778 m) 190 lb (86.2 kg)   02/14/21 0923 (!) 141/85 98.3 °F (36.8 °C) Oral 82 17 98 % -- --          Wt Readings from Last 1 Encounters:   02/14/21 190 lb (86.2 kg)         General appearance - alert, well appearing, and in no distress  Abdomen (pt was given pain meds prior to my evaluation) - Soft, not distended. No rebound or guarding. There is tenderness in the left lower quadrant. Pelvic - normal external genitalia, vulva, vagina, cervix, uterus and adnexa, exam chaperoned by female assistant. There is some tenderness with uterine movement and in the left adnexa  Neurological - alert, oriented, normal speech, no focal findings or movement disorder noted  Musculoskeletal - no joint tenderness, deformity or swelling  Extremities - peripheral pulses normal, no pedal edema, no clubbing or cyanosis  Skin - normal coloration and turgor, no rashes, no suspicious skin lesions noted      Review of Labs and Diagnostic Testing:      CBC:   Lab Results   Component Value Date    WBC 6.8 02/14/2021    RBC 4.88 02/14/2021    HGB 14.6 02/14/2021    HCT 42.4 02/14/2021    MCV 86.9 02/14/2021    RDW 13.4 04/13/2016     02/14/2021     Urine preg test: neg    Radiology:    1. The left ovary is asymmetrically larger than the right measuring 6.4 x 3.7 x 4.9 cm and the left ovarian parenchymal echogenicity is more heterogeneous and not seen on the right. There are several follicles scattered throughout the left ovary with the    largest measuring 2.2 cm. There is diminished vascularity within the left ovary compared to the right however venous and arterial waveform tracings were documented.  There is a large amount of free fluid adjacent to the left ovary. The left adnexa region    is otherwise unremarkable. Left ovarian torsion which is either intermittent or lower grade without complete absence of arterial and venous flow could have this appearance. Clinical management is recommended. Stat GYN referral is also recommended.       2. There is a large amount of free fluid within the cul-de-sac and adjacent to both ovaries.       3. Johnny Campo RN notified 2/14/2021 at 1344 hours. Assessment:    Patient Active Problem List   Diagnosis    Overweight (BMI 25.0-29. 9)    Left lower quadrant pain    Left ovarian cyst         Plan:  1. Disc findings with patient and her significant other. Disc enlarged left ovary that does have cyst present within it. The US shows bloodflow but decreased to what is on the other side. There is also free fluid present within the pelvis. We discussed the possibilities of hemorrhagic ovarian cyst vs ovarian torsion. Plan for operative laparoscopy, possible left ovarian cystectomy possible left oopherectomy. We discussed due to her young age, would try to concern the ovary if at all possible. If a torsion is present and thought untorsing was possible, we may go this route. She understands the ovary may need to be removed. She understand that even if the ovary is removed, she is still able to get pregnant as the right ovary is present. We also discussed if the ovary is untorsed, there is a possibility it could retorse and if that occurs, she may need a second surgery, but want to be as conservative as possible due to her young age. R/b/a of surgery reviewed including risk of bleeding, infection, and injury to nearby organs. All questions answered and pt wishes to proceed.     Discussion with the patient and/ or family for proposed care, treatment, services; benefits, risks, side effects; likelihood of achieving goals and potential problems that may occur during recuperation was had and all questions were answered. Discussion with the patient and/ or family of reasonable alternatives to the proposed care, treatment, services and the discussion of the risks, benefits, side effects related to the alternatives and the risk related to not receiving the proposed care treatment services was also had and all questions were answered.         Electronically signed by Malachi Henriquez MD on 2/14/2021 at 2:46 PM

## 2021-02-14 NOTE — ED NOTES
Dr Graf Samples in room to complete a pelvic exam.  Dr Graf Samples in room to discuss US results. Plan for admission.       Coleman Laboy RN  02/14/21 [id number]

## 2021-02-14 NOTE — PROGRESS NOTES
Patient arrives to the pre-op holding area. Nasal swabbing is performed and the patient's temperature is 98.2°F.

## 2021-02-14 NOTE — ANESTHESIA PRE PROCEDURE
Department of Anesthesiology  Preprocedure Note       Name:  Marjie Collet   Age:  23 y.o.  :  1995                                          MRN:  [id number]         Date:  2021      Surgeon: Efrain Araya):  Dayana Buckley MD    Procedure: Procedure(s):  LAPAROSCOPY EXPLORATORY, POSSIBLE LEFT CYSTECTOMY, POSSIBLE LEFT OOPHORECTOMY    Medications prior to admission:   Prior to Admission medications    Medication Sig Start Date End Date Taking? Authorizing Provider   Norethin-Eth Estrad Triphasic (ARANELLE) 0.5/1/0.5-35 MG-MCG TABS TAKE 1 TABLET BY MOUTH EVERY DAY 20  Yes Lindsay Jimmyn, APRN - CNP   ibuprofen (ADVIL;MOTRIN) 200 MG tablet Take 200 mg by mouth every 6 hours as needed for Pain   Yes Historical Provider, MD       Current medications:    No current facility-administered medications for this encounter. Current Outpatient Medications   Medication Sig Dispense Refill    Norethin-Eth Estrad Triphasic (ARANELLE) 0.5/1/0.5-35 MG-MCG TABS TAKE 1 TABLET BY MOUTH EVERY DAY 28 tablet 11    ibuprofen (ADVIL;MOTRIN) 200 MG tablet Take 200 mg by mouth every 6 hours as needed for Pain         Allergies: Allergies   Allergen Reactions    Cefdinir Rash       Problem List:    Patient Active Problem List   Diagnosis Code    Overweight (BMI 25.0-29. 9) E66.3    Left lower quadrant pain R10.32    Left ovarian cyst N83.202       Past Medical History:        Diagnosis Date    Acne     Dr. Ashish Cintron.  Concussion 10/27/2015    Obesity        Past Surgical History:        Procedure Laterality Date    ANTERIOR CRUCIATE LIGAMENT REPAIR  2012    right knee-Dr. Bob Alpha    TYMPANOSTOMY TUBE PLACEMENT      age 5    WISDOM TOOTH EXTRACTION Bilateral 5-    Dr. Malick Oliveira       Social History:    Social History     Tobacco Use    Smoking status: Never Smoker    Smokeless tobacco: Never Used   Substance Use Topics    Alcohol use:  No                                Counseling given: Not Answered Vital Signs (Current):   Vitals:    02/14/21 1110 02/14/21 1157 02/14/21 1339 02/14/21 1437   BP: 121/74 131/85 119/85 (!) 128/90   Pulse: 67 83 84 69   Resp: 16 16 16 16   Temp:       TempSrc:       SpO2: 100% 99% 99% 100%   Weight:       Height:                                                  BP Readings from Last 3 Encounters:   02/14/21 (!) 128/90   01/20/20 104/80   01/08/19 112/82       NPO Status:                                                                                 BMI:   Wt Readings from Last 3 Encounters:   02/14/21 190 lb (86.2 kg)   01/20/20 180 lb (81.6 kg)   04/12/19 200 lb (90.7 kg)     Body mass index is 27.26 kg/m². CBC:   Lab Results   Component Value Date    WBC 6.8 02/14/2021    RBC 4.88 02/14/2021    HGB 14.6 02/14/2021    HCT 42.4 02/14/2021    MCV 86.9 02/14/2021    RDW 13.4 04/13/2016     02/14/2021       CMP:   Lab Results   Component Value Date     02/14/2021    K 4.7 02/14/2021     02/14/2021    CO2 23 02/14/2021    BUN 12 02/14/2021    CREATININE 0.8 02/14/2021    LABGLOM 89 02/14/2021    GLUCOSE 86 02/14/2021    PROT 7.4 02/14/2021    CALCIUM 8.9 02/14/2021    BILITOT 0.4 02/14/2021    ALKPHOS 56 02/14/2021    AST 22 02/14/2021    ALT 18 02/14/2021       POC Tests: No results for input(s): POCGLU, POCNA, POCK, POCCL, POCBUN, POCHEMO, POCHCT in the last 72 hours.     Coags:   Lab Results   Component Value Date    INR 1.07 04/13/2016    APTT 30.8 04/13/2016       HCG (If Applicable):   Lab Results   Component Value Date    PREGTESTUR NEGATIVE 06/21/2017    PREGSERUM NEGATIVE 02/14/2021        ABGs: No results found for: PHART, PO2ART, IYS3IJV, ACW8CHA, BEART, C0KHMFYH     Type & Screen (If Applicable):  No results found for: LABABO, LABRH    Drug/Infectious Status (If Applicable):  No results found for: HIV, HEPCAB    COVID-19 Screening (If Applicable):   Lab Results   Component Value Date    COVID19 NOT DETECTED 02/14/2021         Anesthesia Evaluation Patient summary reviewed  Airway: Mallampati: II  TM distance: >3 FB   Neck ROM: full  Mouth opening: > = 3 FB Dental:          Pulmonary:                              Cardiovascular:                      Neuro/Psych:               GI/Hepatic/Renal:             Endo/Other:                     Abdominal:           Vascular:                                        Anesthesia Plan      general     ASA 1 - emergent       Induction: intravenous and rapid sequence. MIPS: Postoperative opioids intended and Prophylactic antiemetics administered. Anesthetic plan and risks discussed with patient, mother and father. Plan discussed with AJAY. Gideon Meza.  [address] [place] [address]   2/14/2021

## 2021-02-14 NOTE — PROGRESS NOTES
1638 pt arrived to PACU, awakens to voice.  C/o pain 7/10, medicated with 50 mcg fentanyl per Kristi Else  3340 pt awake in bed, states pain 4/10 and tolerable  1653 c/o pain 7/10, medicated with 50 mcg fentanyl  1703 pt states pain 3/10 and tolerable, denies need for pain medication at this time  1713 meets criteria for discharge from PACU, transported to [address] [place], [place] [id number]

## 2021-02-14 NOTE — ED PROVIDER NOTES
Anterior cruciate ligament repair (2012); and [place] tooth extraction (Bilateral, 5-). CURRENT MEDICATIONS       Previous Medications    IBUPROFEN (ADVIL;MOTRIN) 200 MG TABLET    Take 200 mg by mouth every 6 hours as needed for Pain    NORETHIN-ETH ESTRAD TRIPHASIC (ARANELLE) 0.5/1/0.5-35 MG-MCG TABS    TAKE 1 TABLET BY MOUTH EVERY DAY       ALLERGIES     is allergic to cefdinir. HISTORY     She indicated that her mother is alive. She indicated that her father is alive. She indicated that her maternal grandmother is alive. She indicated that her maternal grandfather is alive. She indicated that her paternal grandmother is . She indicated that her paternal grandfather is alive. family history includes Alzheimer's Disease in her paternal grandfather; Breast Cancer in her maternal grandmother and paternal grandmother; Cancer in her maternal grandmother; Cancer (age of onset: 61) in her paternal grandmother; Crohn's Disease in her father; Diabetes in her maternal grandfather; Heart Disease in her maternal grandfather and maternal grandmother; High Blood Pressure in her father and mother; Kidney Disease in her maternal grandfather; Thyroid Disease in her paternal grandfather. SOCIALHISTORY      reports that she has never smoked. She has never used smokeless tobacco. She reports that she does not drink alcohol or use drugs. PHYSICAL EXAM     INITIAL VITALS:  height is 5' 10\" (1.778 m) and weight is 190 lb (86.2 kg). Her oral temperature is 98.3 °F (36.8 °C). Her blood pressure is 128/90 (abnormal) and her pulse is 69. Her respiration is 16 and oxygen saturation is 100%. Physical Exam  Vitals signs and nursing note reviewed. Constitutional:       Comments: Well Developed Well Nourished Appearing     HENT:      Head: Normocephalic and atraumatic. Eyes:      Pupils: Pupils are equal, round, and reactive to light. Neck:      Musculoskeletal: Normal range of motion and neck supple. Cardiovascular:      Rate and Rhythm: Normal rate and regular rhythm. Heart sounds: Normal heart sounds. Pulmonary:      Effort: Pulmonary effort is normal. No respiratory distress. Breath sounds: Normal breath sounds. No wheezing. Abdominal:      General: Bowel sounds are normal. There is no distension. Palpations: Abdomen is soft. Tenderness: There is abdominal tenderness. Comments: Tenderness left CVA. DIFFERENTIAL DIAGNOSIS:   Left-sided flank pain possible kidney stone. Less likely ovarian torsion. Possible UTI possible musculoskeletal pain. DIAGNOSTIC RESULTS     EKG: All EKG's are interpreted by the Emergency Department Physician who either signs or Co-signs this chart in the absence of a cardiologist.      RADIOLOGY: non-plain film images(s) such as CT, Ultrasound and MRI are read by the radiologist.  CT scan abdomen pelvis without contrast read per radiology     US NON OB TRANSVAGINAL (Final result)  Result time 02/14/21 13:46:28  Final result by Dick Melendrez MD (02/14/21 13:46:28)                Impression:    1. The left ovary is asymmetrically larger than the right measuring 6.4 x 3.7 x 4.9 cm and the left ovarian parenchymal echogenicity is more heterogeneous and not seen on the right. There are several follicles scattered throughout the left ovary with the    largest measuring 2.2 cm. There is diminished vascularity within the left ovary compared to the right however venous and arterial waveform tracings were documented. There is a large amount of free fluid adjacent to the left ovary. The left adnexa region    is otherwise unremarkable. Left ovarian torsion which is either intermittent or lower grade without complete absence of arterial and venous flow could have this appearance. Clinical management is recommended. Stat GYN referral is also recommended. 2. There is a large amount of free fluid within the cul-de-sac and adjacent to both ovaries.      3. Kylee SOFIA notified 2/14/2021 at 1344 hours. **This report has been created using voice recognition software.  It may contain minor errors which are inherent in voice recognition technology. **     Final report electronically signed by Dr. Katie Carrillo on 2/14/2021 1:46 PM            Narrative:    PROCEDURE: US PELVIS AND OVARIAN DOPPLER     CLINICAL INFORMATION: Left-sided pelvic pain. COMPARISON: No prior study. TECHNIQUE: Grayscale and color transvaginal sonographic evaluation of the pelvis performed in longitudinal and transverse planes. TECHNICAL DATA:   LMP-01.30.2021   Uterus - 7.20 x 3.76 x 4.18 cm   Endometrium - 0.29 cm   Right Ovary - 4.68 x 2.61 x 4.07 cm   Left Ovary - 6.36 x 3.67 x 4.88 cm       FINDINGS:   UTERUS: The uterus is normal size. MYOMETRIUM: Normal.     ENDOMETRIUM:   1. The endometrial stripe is normal thickness measuring 0.3 cm in AP dimension. RIGHT OVARY/ADNEXA:   1. The right ovary is normal size with normal parenchymal echogenicity measuring 4.7 x 2.6 x 4.1 cm. There are numerous scattered follicles within the right ovary, largest measuring 2.8 cm suggesting a dominant follicle. There is no other right ovarian   or right adnexal mass. The color images of the right ovary are unremarkable and venous and arterial waveform tracings were documented within the right ovary. There is no right ovarian torsion. There is a large amount of free fluid adjacent to the right   ovary. LEFT OVARY/ADNEXA:   1. The left ovary is asymmetrically larger than the right measuring 6.4 x 3.7 x 4.9 cm and the left ovarian parenchymal echogenicity is more heterogeneous and not seen on the right. There are several follicles scattered throughout the left ovary with the    largest measuring 2.2 cm. There is diminished vascularity within the left ovary compared to the right however venous and arterial waveform tracings were documented.  There is a large amount of free fluid adjacent to the cm       FINDINGS:   UTERUS: The uterus is normal size. MYOMETRIUM: Normal.     ENDOMETRIUM:   1. The endometrial stripe is normal thickness measuring 0.3 cm in AP dimension. RIGHT OVARY/ADNEXA:   1. The right ovary is normal size with normal parenchymal echogenicity measuring 4.7 x 2.6 x 4.1 cm. There are numerous scattered follicles within the right ovary, largest measuring 2.8 cm suggesting a dominant follicle. There is no other right ovarian   or right adnexal mass. The color images of the right ovary are unremarkable and venous and arterial waveform tracings were documented within the right ovary. There is no right ovarian torsion. There is a large amount of free fluid adjacent to the right   ovary. LEFT OVARY/ADNEXA:   1. The left ovary is asymmetrically larger than the right measuring 6.4 x 3.7 x 4.9 cm and the left ovarian parenchymal echogenicity is more heterogeneous and not seen on the right. There are several follicles scattered throughout the left ovary with the    largest measuring 2.2 cm. There is diminished vascularity within the left ovary compared to the right however venous and arterial waveform tracings were documented. There is a large amount of free fluid adjacent to the left ovary. The left adnexa region    is otherwise unremarkable. CUL DE SAC:   1. There is a large amount of fluid within the cul-de-sac and adjacent to both ovaries.                       CT ABDOMEN PELVIS WO CONTRAST Additional Contrast? None (Final result)  Result time 02/14/21 11:43:34  Final result by Kiah Stewart DO (02/14/21 11:43:34)                Impression:        1. Prominent cysts in the pelvis. Consider pelvic ultrasound for further evaluation. 2. Otherwise no acute intra-abdominal or intrapelvic abnormality. **This report has been created using voice recognition software. It may contain minor errors which are inherent in voice recognition technology. **     Final report electronically signed by Dr. Pilo Meyers MD on 2/14/2021 11:43 AM            Narrative:    PROCEDURE: CT ABDOMEN PELVIS WO CONTRAST     CLINICAL INFORMATION: Left side flank pain . COMPARISON: None. TECHNIQUE: Axial 5 mm CT images were obtained through the abdomen and pelvis. No contrast was given. Coronal and sagittal reconstructions were created. All CT scans at this facility use dose modulation, iterative reconstruction, and/or weight-based dosing when appropriate to reduce radiation dose to as low as reasonably achievable. FINDINGS:    The visualized portions of the lungs are clear. The visualized portion of the unopacified heart is unremarkable. The unopacified liver and gallbladder are normal in appearance. Adrenal glands, spleen and pancreas are unremarkable. No retroperitoneal or mesenteric lymphadenopathy is identified. The unopacified kidneys are normal in appearance without nephrolithiasis    or hydronephrosis. No ureterolithiasis is identified. Large bowel appears within normal limits. Small bowel appears within normal limits. The appendix is normal in appearance. There is a 6 x 4.7 cm cystic lesion posterior to the uterus and a 3.4 x 4 cm cystic lesion in the right adnexal region. Uterus is   normal in appearance. There is minimal free fluid in the pelvis.  Visualized osseous structures appear intact.                       LABS:   Labs Reviewed   ANION GAP - Abnormal; Notable for the following components:       Result Value    Anion Gap 7.0 (*)     All other components within normal limits   GLOMERULAR FILTRATION RATE, ESTIMATED - Abnormal; Notable for the following components:    Est, Glom Filt Rate 89 (*)     All other components within normal limits   OSMOLALITY - Abnormal; Notable for the following components:    Osmolality Calc 269.2 (*)     All other components within normal limits   URINE RT REFLEX TO CULTURE   CBC WITH AUTO DIFFERENTIAL   BASIC METABOLIC PANEL HEPATIC FUNCTION PANEL   LIPASE   MAGNESIUM   HCG, SERUM, QUALITATIVE   COVID-19       EMERGENCY DEPARTMENT COURSE:   :    Vitals:    02/14/21 1110 02/14/21 1157 02/14/21 1339 02/14/21 1437   BP: 121/74 131/85 119/85 (!) 128/90   Pulse: 67 83 84 69   Resp: 16 16 16 16   Temp:       TempSrc:       SpO2: 100% 99% 99% 100%   Weight:       Height:         Patient was seen history physical exam was performed. Patient was given Toradol 30 mg IV. Patient had to be given 2 more additional doses of morphine. She was still pretty uncomfortable would not lay flat I did call OB. Looks like she does have an ovarian torsion. Will contact OB. Dr. Luther Puckett states she will take the patient to the OR. See disposition below    CRITICAL CARE:  None    CONSULTS:  Dr. Bauman Lunch:  None    FINAL IMPRESSION      1. Pelvic pain          DISPOSITION/PLAN   Discharge    PATIENT REFERRED TO:  No follow-up provider specified.     DISCHARGE MEDICATIONS:  New Prescriptions    No medications on file       (Please note that portions of this note were completed with a voice recognitionprogram.  Efforts were made to edit the dictations but occasionally words are mis-transcribed.)    Marcus Alfredo, [address], [place]  02/14/21 4928

## 2021-02-14 NOTE — DISCHARGE SUMMARY
GYN Surgery  Discharge Summary     Patient ID:  Amelia Piedra  [id number]  44 y.o.  1995    Admit date: 2/14/2021    Admitting Physician: Cain Woodson    Discharge Diagnoses: Ovarian torsion    Discharged Condition: good      Procedures Performed: Operative laparoscopy, reversal of ovarian torsion    Hospital Course: Patient was admitted on the day of surgery, and underwent an uncomplicated procedure. See dictated op note. Disposition: home    Patient Instructions: Activity: activity as tolerated, no sex for 2 weeks and no driving while on analgesics  Diet: regular  Wound Care: as directed    Discharge Medication:    Polo Robins   Home Medication Instructions [facility]:[id number]    Printed on:02/14/21 5766   Medication Information                      HYDROcodone-acetaminophen (NORCO) 5-325 MG per tablet  Take 1 tablet by mouth every 6 hours as needed for Pain for up to 5 days. Intended supply: 3 days.  Take lowest dose possible to manage pain             ibuprofen (ADVIL;MOTRIN) 200 MG tablet  Take 200 mg by mouth every 6 hours as needed for Pain             Norethin-Eth Estrad Triphasic (ARANELLE) 0.5/1/0.5-35 MG-MCG TABS  TAKE 1 TABLET BY MOUTH EVERY DAY                  Discharge Date: 2/14/21    Condition: Good    Follow-up with Cain Woodson in 1 week (Friday is ok)    Signed:  Electronically signed by Cain Woodson MD on 2/14/2021 at 4:34 PM

## 2021-02-14 NOTE — ED TRIAGE NOTES
Patient presents with left flank pain that started this morning. States her dad has frequent kidney stones. Denies urinary symptoms other than frequency. Patient states she took tylenol about 45 minutes ago and it did not help.

## 2021-02-14 NOTE — ANESTHESIA POSTPROCEDURE EVALUATION
Department of Anesthesiology  Postprocedure Note    Patient: Nayeli Peace  MRN: [id number]  YOB: 1995  Date of evaluation: 2/14/2021  Time:  5:40 PM     Procedure Summary     Date: 02/14/21 Room / Location: [facility] OR [address] / [address] OR    Anesthesia Start: 1550 Anesthesia Stop: 1640    Procedure: OPERATIVE LAPAROSCOPY, REVERSAL OF LEFT OVARIAN TORSION (N/A Abdomen) Diagnosis: (Ovarain Cyst)    Surgeons: Thaddeus Arredondo MD Responsible Provider: Bailee Mukherjee DO    Anesthesia Type: general ASA Status: 1 - Emergent          Anesthesia Type: No value filed. Elizabeth Phase I: Elizabeth Score: 10    Elizabeth Phase II:      Last vitals: Reviewed and per EMR flowsheets. Anesthesia Post Evaluation    Comments: Ciara Williamson 62  POST-ANESTHESIA NOTE       Name:  Nayeli Peace                                         Age:  23 y.o.   MRN:  [id number]      Last Vitals:  /70   Pulse 63   Temp 98.6 °F (37 °C) (Oral)   Resp 16   Ht 5' 10\" (1.778 m)   Wt 190 lb (86.2 kg)   SpO2 100%   BMI 27.26 kg/m²   Patient Vitals in the past 4 hrs:  02/14/21 1715, BP:120/70, Temp:98.6 °F (37 °C), Temp src:Oral, Pulse:63, Resp:16, SpO2:100 %  02/14/21 1710, BP:123/77, Pulse:57, Resp:18, SpO2:100 %  02/14/21 1705, BP:124/76, Pulse:72, Resp:14, SpO2:100 %  02/14/21 1700, BP:125/80, Pulse:69, Resp:15, SpO2:100 %  02/14/21 1655, BP:116/73, Pulse:65, Resp:16, SpO2:100 %  02/14/21 1650, BP:112/70, Pulse:63, Resp:16, SpO2:100 %  02/14/21 1645, BP:111/64, Pulse:62, Resp:15, SpO2:98 %  02/14/21 1640, BP:112/64, Pulse:74, Resp:18, SpO2:99 %  02/14/21 1638, BP:134/77, Temp:97.4 °F (36.3 °C), Temp src:Temporal, Pulse:71, Resp:16, SpO2:98 %  02/14/21 1437, BP:(!) 128/90, Pulse:69, Resp:16, SpO2:100 %    Level of Consciousness:  Awake    Respiratory:  Stable    Oxygen Saturation:  Stable    Cardiovascular:  Stable    Hydration:  Adequate    PONV:  Stable    Post-op Pain:  Adequate analgesia Post-op Assessment:  No apparent anesthetic complications    Additional Follow-Up / Treatment / Comment:  None    Zohreh Russell.  [facility],   February 14, 2021   5:40 PM

## 2021-02-14 NOTE — ED NOTES
Mom and dad in room. Patient has been to bathroom and urinated pre op. Being transferred to OR at this time.        Param Fitzgerald RN  02/14/21 8882

## 2021-02-14 NOTE — PROGRESS NOTES
Patient arrived to [phone] by bed from surgery. Awake and oriented. Ambulated to bathroom, voided, but missed collection hat. Patient has spotting of blood vaginally. 3 sites to abdomen with surgical glue all dry and intact. INT present in left hand. Lr infusing into RAC at 100 ml/hr 50 ml left to infuse. Parents and s/o at bedside. Oriented to room, call light left within reach.

## 2021-02-14 NOTE — ED NOTES
Out of bed and ambulated to the bathroom and back. States she felt pressure like she had to urinate but was unable to go. Returned to bed. Resting on cot. Waiting completion of CT scan.       Terry Carpio RN  02/14/21 1779

## 2021-02-15 NOTE — DISCHARGE INSTR - OTHER ORDERS
Laparoscopy: What to Expect at [address] [place] [address]  After laparoscopic surgery, you are likely to have pain for the next several days. You may have a low fever and feel tired and sick to your stomach. This is common. You should feel better after 1 to 2 weeks. This care sheet gives you a general idea about how long it will take for you to recover. But each person recovers at a different pace. Follow the steps below to get better as quickly as possible. How can you care for yourself at home? Activity    · Rest when you feel tired. Getting enough sleep will help you recover.     · Try to walk each day. Start by walking a little more than you did the day before. Bit by bit, increase the amount you walk. Walking boosts blood flow and helps prevent pneumonia and constipation.     · Avoid strenuous activities, such as bicycle riding, jogging, weight lifting, or aerobic exercise, until your doctor says it is okay.     · Avoid lifting anything that would make you strain. This may include a child, heavy grocery bags and milk containers, a heavy briefcase or backpack, cat litter or dog food bags, or a vacuum .     · You may also have pain in your shoulder. The pain usually lasts about 1 or 2 days.     · You may drive when you are no longer taking pain medicine and can quickly move your foot from the gas pedal to the brake. You must also be able to sit comfortably for a long period of time, even if you do not plan to go far. You might get caught in traffic.     · You will probably need to take 2 weeks off from work. It depends on the type of work you do and how you feel.     · You may shower 24 to 48 hours after surgery, if your doctor okays it. Pat the cut (incision) dry. Do not take a bath for the first 2 weeks, or until your doctor tells you it is okay.    Diet    · If your stomach is upset, try bland, low-fat foods such as plain rice, broiled chicken, toast, and yogurt.     · Drink plenty of fluids (enough so treatment and safety. Be sure to make and go to all appointments, and call your doctor if you are having problems. It's also a good idea to know your test results and keep a list of the medicines you take. When should you call for help? Call 911 anytime you think you may need emergency care. For example, call if:    · You passed out (lost consciousness).     · You are short of breath. Call your doctor now or seek immediate medical care if:    · You have pain that does not get better after you take pain medicine.     · You have loose stitches, or your incision comes open.     · Bright red blood has soaked through your bandage.     · You have signs of infection, such as:  ? Increased pain, swelling, warmth, or redness. ? Red streaks leading from the incision. ? Pus draining from the incision. ? A fever.     · You are sick to your stomach or cannot keep fluids down.     · You have signs of a blood clot in your leg (called a deep vein thrombosis), such as:  ? Pain in your calf, back of the knee, thigh, or groin. ? Redness and swelling in your leg or groin.     · You cannot pass stools or gas. Watch closely for any changes in your health, and be sure to contact your doctor if you have any problems. Where can you learn more? Go to https://Vettery.[employer]. org and sign in to your [employer] Web account. Enter Q841 in the [facility] box to learn more about \"Laparoscopy: What to Expect at Home. \"     If you do not have an account, please click on the \"Sign Up Now\" link. Current as of: April 15, 2020               Content Version: 12.6  © 3134-9388 [email or web address]. Care instructions adapted under license by [facility] ([facility]). If you have questions about a medical condition or this instruction, always ask your healthcare professional. [address] any warranty or liability for your use of this information.

## 2021-02-15 NOTE — OP NOTE
[address] [place] [address]                                OPERATIVE REPORT    PATIENT NAME: Margy Barton                     :        1995  MED REC NO:   [id number]                           ROOM:       3204  ACCOUNT NO:   [de-identified]                           ADMIT DATE: 2021  PROVIDER:     Monisha Adamson M.D.    DATE OF PROCEDURE:  2021    PREOPERATIVE DIAGNOSIS:  An ovarian cyst, suspected ovarian torsion  versus hemorrhagic cyst.    POSTOPERATIVE DIAGNOSIS:  Left ovarian torsion. PROCEDURES:  Exploratory laparoscopy, reversal of ovarian torsion. SURGEON:  Monisha Adamson MD    EBL:  5 mL. ANESTHESIA:  General.    COMPLICATIONS:  None. SPECIMENS:  None. FINDINGS:  Left ovarian torsion with one twist and no necrosis. INDICATIONS:  This is a 22-year-old with sudden-onset left lower  quadrant pain that started abruptly this morning. Nothing was making it  better so that she came in, nothing really made it worse. She did have  similar pain back in 2020 that went away and was never evaluated. On  ultrasound, the left ovary was found to be 6.4 x 3.7 x 4.9 cm with  several follicles, largest one was measuring 2.2 cm. It was noted that  there was vascularity to the left ovary, but compared to the right was  significantly less. There was a large amount of free fluid adjacent to  both the left and the right ovaries. Right ovary was measuring 4.7 x  2.6 x 4.1 cm with several follicles with the largest one measuring 2.8  cm and again free fluid was around that ovary, normal vascularity. So I  discussed these findings with the patient. I discussed concerns for  ovarian torsion versus possible hemorrhagic ovarian cyst.  So decision  was made to proceed with exploratory laparoscopy to evaluate what was  going on.   We did discuss at that time if this ovarian torsion was present and no necrosis was found, there is a possibility that the  torsion could be relieved and that maybe all that needs to be done,  however, if this is done, we discussed the risk that torsion could occur  again, but due to her young age, would want to preserve that ovary if at  all possible. DESCRIPTION OF PROCEDURE:  The patient was taken back to the operating  room where general anesthesia was obtained without difficulty. She was  then placed in the dorsal lithotomy position and prepared and draped in  the normal sterile fashion. Her bladder was drained at that time. A  weighted speculum was placed in the vagina. Sherl Deis was used to  retract the anterior vaginal wall. A single-tooth tenaculum was used to  grasp the anterior lip of the cervix. Cervix was dilated, however, the  appropriate uterine manipulator was not immediately available, so a  sponge stick was used to allow her uterine manipulation. Her legs were  then lowered. A change of gloves occurred and attention was turned to  the abdominal portion of the procedure. A 5-mm infraumbilical incision  was made and entry was gained into the abdominal cavity under direct  visualization using a Visiport. Insufflation occurred under direct  visualization as well. She was then placed into Trendelenburg and an  11-mm port was placed suprapubically under direct visualization. Inspection of the abdomen and pelvis at that time revealed a  normal-appearing uterus. Normal right tube and ovary. The left tube  and ovary revealed a torsion with one twist and ovary and tube revealed  no necrosis. So with that a 5-mm port was placed in the left lower  quadrant and then using a probe and [place] and [place], the ovary was  untorsed and again no necrosis was noted. The ovary was tacked down  into the pelvis in the hopes that it would not retorse.   Inspection of  the rest of the abdomen revealed a normal appendix, normal liver, normal gallbladder. All instruments were then removed from the patient. The  fascia of the 11 port was closed using an 0 Vicryl and the skin  incisions were closed using 4-0 Vicryl in a subcuticular manner. Overall, the patient tolerated the procedure well. Sponge, lap, and  needle counts were correct x2. She was taken to the recovery room in  stable condition.         Shira Haque M.D.    D: 02/14/2021 16:49:08       T: 02/14/2021 22:14:13     [email or web address]  Job#: [id number]     Doc#: [id number]    CC:

## 2021-03-24 ENCOUNTER — NURSE ONLY (OUTPATIENT)
Dept: LAB | Age: 26
End: 2021-03-24

## 2021-04-04 ENCOUNTER — APPOINTMENT (OUTPATIENT)
Dept: ULTRASOUND IMAGING | Age: 26
End: 2021-04-04
Payer: COMMERCIAL

## 2021-04-04 ENCOUNTER — HOSPITAL ENCOUNTER (EMERGENCY)
Age: 26
Discharge: HOME | End: 2021-04-04
Attending: FAMILY MEDICINE
Payer: COMMERCIAL

## 2021-04-04 VITALS
WEIGHT: 210 LBS | SYSTOLIC BLOOD PRESSURE: 121 MMHG | BODY MASS INDEX: 30.13 KG/M2 | RESPIRATION RATE: 16 BRPM | TEMPERATURE: 98.3 F | HEART RATE: 71 BPM | OXYGEN SATURATION: 100 % | DIASTOLIC BLOOD PRESSURE: 82 MMHG

## 2021-04-04 DIAGNOSIS — R10.32 ABDOMINAL PAIN, LEFT LOWER QUADRANT: ICD-10-CM

## 2021-04-04 DIAGNOSIS — N83.512 TORSION OF LEFT OVARY AND OVARIAN PEDICLE: Primary | ICD-10-CM

## 2021-04-04 LAB
ANION GAP SERPL CALCULATED.3IONS-SCNC: 15 MEQ/L (ref 8–16)
BASOPHILS # BLD: 0.5 %
BASOPHILS ABSOLUTE: 0 THOU/MM3 (ref 0–0.1)
BUN BLDV-MCNC: 12 MG/DL (ref 7–22)
CALCIUM SERPL-MCNC: 9.1 MG/DL (ref 8.5–10.5)
CHLORIDE BLD-SCNC: 101 MEQ/L (ref 98–111)
CO2: 21 MEQ/L (ref 23–33)
CREAT SERPL-MCNC: 0.7 MG/DL (ref 0.4–1.2)
EOSINOPHIL # BLD: 2.2 %
EOSINOPHILS ABSOLUTE: 0.2 THOU/MM3 (ref 0–0.4)
ERYTHROCYTE [DISTWIDTH] IN BLOOD BY AUTOMATED COUNT: 12.3 % (ref 11.5–14.5)
ERYTHROCYTE [DISTWIDTH] IN BLOOD BY AUTOMATED COUNT: 38.5 FL (ref 35–45)
GFR SERPL CREATININE-BSD FRML MDRD: > 90 ML/MIN/1.73M2
GLUCOSE BLD-MCNC: 86 MG/DL (ref 70–108)
HCT VFR BLD CALC: 45.9 % (ref 37–47)
HEMOGLOBIN: 15.7 GM/DL (ref 12–16)
IMMATURE GRANS (ABS): 0.03 THOU/MM3 (ref 0–0.07)
IMMATURE GRANULOCYTES: 0.4 %
LYMPHOCYTES # BLD: 42.3 %
LYMPHOCYTES ABSOLUTE: 3.2 THOU/MM3 (ref 1–4.8)
MCH RBC QN AUTO: 29.6 PG (ref 26–33)
MCHC RBC AUTO-ENTMCNC: 34.2 GM/DL (ref 32.2–35.5)
MCV RBC AUTO: 86.4 FL (ref 81–99)
MONOCYTES # BLD: 7.3 %
MONOCYTES ABSOLUTE: 0.6 THOU/MM3 (ref 0.4–1.3)
NUCLEATED RED BLOOD CELLS: 0 /100 WBC
OSMOLALITY CALCULATION: 272.9 MOSMOL/KG (ref 275–300)
PLATELET # BLD: 266 THOU/MM3 (ref 130–400)
PMV BLD AUTO: 10.3 FL (ref 9.4–12.4)
POTASSIUM SERPL-SCNC: 3.5 MEQ/L (ref 3.5–5.2)
PREGNANCY, SERUM: NEGATIVE
RBC # BLD: 5.31 MILL/MM3 (ref 4.2–5.4)
SEG NEUTROPHILS: 47.3 %
SEGMENTED NEUTROPHILS ABSOLUTE COUNT: 3.6 THOU/MM3 (ref 1.8–7.7)
SODIUM BLD-SCNC: 137 MEQ/L (ref 135–145)
WBC # BLD: 7.6 THOU/MM3 (ref 4.8–10.8)

## 2021-04-04 PROCEDURE — 76830 TRANSVAGINAL US NON-OB: CPT

## 2021-04-04 PROCEDURE — 96376 TX/PRO/DX INJ SAME DRUG ADON: CPT

## 2021-04-04 PROCEDURE — 2580000003 HC RX 258: Performed by: FAMILY MEDICINE

## 2021-04-04 PROCEDURE — 96374 THER/PROPH/DIAG INJ IV PUSH: CPT

## 2021-04-04 PROCEDURE — 36415 COLL VENOUS BLD VENIPUNCTURE: CPT

## 2021-04-04 PROCEDURE — 6360000002 HC RX W HCPCS

## 2021-04-04 PROCEDURE — 93975 VASCULAR STUDY: CPT

## 2021-04-04 PROCEDURE — 85025 COMPLETE CBC W/AUTO DIFF WBC: CPT

## 2021-04-04 PROCEDURE — 99284 EMERGENCY DEPT VISIT MOD MDM: CPT

## 2021-04-04 PROCEDURE — 84703 CHORIONIC GONADOTROPIN ASSAY: CPT

## 2021-04-04 PROCEDURE — 96375 TX/PRO/DX INJ NEW DRUG ADDON: CPT

## 2021-04-04 PROCEDURE — 6360000002 HC RX W HCPCS: Performed by: FAMILY MEDICINE

## 2021-04-04 PROCEDURE — 80048 BASIC METABOLIC PNL TOTAL CA: CPT

## 2021-04-04 RX ORDER — ONDANSETRON 2 MG/ML
INJECTION INTRAMUSCULAR; INTRAVENOUS
Status: COMPLETED
Start: 2021-04-04 | End: 2021-04-04

## 2021-04-04 RX ORDER — MORPHINE SULFATE 4 MG/ML
4 INJECTION, SOLUTION INTRAMUSCULAR; INTRAVENOUS ONCE
Status: COMPLETED | OUTPATIENT
Start: 2021-04-04 | End: 2021-04-04

## 2021-04-04 RX ORDER — 0.9 % SODIUM CHLORIDE 0.9 %
1000 INTRAVENOUS SOLUTION INTRAVENOUS ONCE
Status: COMPLETED | OUTPATIENT
Start: 2021-04-04 | End: 2021-04-04

## 2021-04-04 RX ORDER — ONDANSETRON 2 MG/ML
4 INJECTION INTRAMUSCULAR; INTRAVENOUS ONCE
Status: COMPLETED | OUTPATIENT
Start: 2021-04-04 | End: 2021-04-04

## 2021-04-04 RX ORDER — MORPHINE SULFATE 4 MG/ML
INJECTION, SOLUTION INTRAMUSCULAR; INTRAVENOUS
Status: COMPLETED
Start: 2021-04-04 | End: 2021-04-04

## 2021-04-04 RX ADMIN — SODIUM CHLORIDE 1000 ML: 9 INJECTION, SOLUTION INTRAVENOUS at 17:25

## 2021-04-04 RX ADMIN — ONDANSETRON 4 MG: 2 INJECTION INTRAMUSCULAR; INTRAVENOUS at 17:25

## 2021-04-04 RX ADMIN — ONDANSETRON 4 MG: 2 INJECTION INTRAMUSCULAR; INTRAVENOUS at 21:00

## 2021-04-04 RX ADMIN — MORPHINE SULFATE 4 MG: 4 INJECTION, SOLUTION INTRAMUSCULAR; INTRAVENOUS at 17:25

## 2021-04-04 RX ADMIN — MORPHINE SULFATE 4 MG: 4 INJECTION, SOLUTION INTRAMUSCULAR; INTRAVENOUS at 21:00

## 2021-04-04 ASSESSMENT — PAIN DESCRIPTION - PAIN TYPE: TYPE: ACUTE PAIN

## 2021-04-04 ASSESSMENT — PAIN DESCRIPTION - FREQUENCY: FREQUENCY: CONTINUOUS

## 2021-04-04 NOTE — ED NOTES
ED nurse-to-nurse bedside report    Chief Complaint   Patient presents with    Abdominal Pain     Hx of twisted ovary x2 weeks ago      LOC: alert and orientated to name, place, date  Vital signs   Vitals:    04/04/21 1655 04/04/21 1704 04/04/21 1902   BP: (!) 141/95 (!) 127/95 124/85   Pulse: 93 94 82   Resp: 18 16 16   Temp: 98.3 °F (36.8 °C)     TempSrc: Oral     SpO2: 100% 100% 100%   Weight: 210 lb (95.3 kg)        Pain:  5  Pain Interventions: Morphine 4 mg  Pain Goal:   Oxygen: No    Current needs required UA, US results, dispo  Telemetry: No  LDAs:   Peripheral IV 04/04/21 Right Wrist (Active)   Site Assessment Clean;Dry; Intact 04/04/21 1711   Line Status Brisk blood return;Specimen collected; Flushed 04/04/21 1711   Dressing Status Clean; Intact;Dry 04/04/21 1711   Dressing Intervention New 04/04/21 1711     Continuous Infusions:   Mobility: Independent  [place] Fall Risk Score: No flowsheet data found.   Fall Interventions: None  Report given to: Wilda Richardson RN  04/04/21 9703

## 2021-04-04 NOTE — ED NOTES
Pt to rm 05 per intake hx of left sided ovarian torsion in February, states she started having LLQ pain this morning and her GYN told her to watch it and come to ER if it got really bad or f/u in her office tomorrow. Pt reports that's the pain got suddenly sharp and unbearable approx 1Hr PTA. States he feels similar to the pain she had with the torsion. Nausea, no emesis- did eat a large lunch today for easter. Pt appears very uncomfortable and restless on cart.       Brooklyn Whittaker RN  04/04/21 4915

## 2021-04-04 NOTE — ED NOTES
Bedside report provided by [facility] to [facility], RN. Pt resting in bed with even and unlabored respriations. Pt states pain is a  5/10 at this time. Pt updated on plan of care. Pt has no further needs at this time. Pt call light in reach.         Rebekah Shaffer RN  04/04/21 1916

## 2021-04-04 NOTE — ED PROVIDER NOTES
EMERGENCY DEPARTMENT ENCOUNTER     CHIEF COMPLAINT   Chief Complaint   Patient presents with    Abdominal Pain     Hx of twisted ovary x2 weeks ago        [facility]   Carmina Verma is a 23 y.o. female with a prior hx of intermittent left ovarian torsion who presents with acute left lower abdominal pain, onset was this morning around 9 am. Pain was sharp and painful, occurring intermittently with intense episodes followed by relief. The duration has been intermittent since the onset. The patient has no associated vaginal or rectal bleeding. There are no alleviating factors. The context is that the symptoms started spontaneously, without any known precipitants. REVIEW OF SYSTEMS   GI: See history of present illness   Cardiac: No chest pain or syncope   Pulmonary: No difficulty breathing or new cough   General: No fevers   : +left adnexal pain; No hematuria or dysuria   See HPI for further details. All other review of systems are reviewed and are otherwise negative. PAST MEDICAL & SURGICAL HISTORY   Past Medical History:   Diagnosis Date    Acne     Dr. Lina Briggs.       Concussion 10/27/2015    Obesity       Past Surgical History:   Procedure Laterality Date    ANTERIOR CRUCIATE LIGAMENT REPAIR  7/2012    right knee-Dr. Stephanie Dorsey LAPAROSCOPY N/A 2/14/2021    OPERATIVE LAPAROSCOPY, REVERSAL OF LEFT OVARIAN TORSION performed by Nolvia Bar MD at [address] [place] [address]      age 5   [de-identified] WISDOM TOOTH EXTRACTION Bilateral 5-    Dr. Kingsley Byers Rx   Medication Sig Dispense Refill    Norethin-Eth Estrad Triphasic (ARANELLE) 0.5/1/0.5-35 MG-MCG TABS TAKE 1 TABLET BY MOUTH EVERY DAY 28 tablet 11    ibuprofen (ADVIL;MOTRIN) 200 MG tablet Take 200 mg by mouth every 6 hours as needed for Pain          ALLERGIES   Allergies   Allergen Reactions    Cefdinir Rash        SOCIAL AND FAMILY HISTORY   Social History     Socioeconomic History    Marital status: Single     Spouse name: None    Number of children: None    Years of education: None    Highest education level: None   Occupational History    None   Social Needs    Financial resource strain: None    Food insecurity     Worry: None     Inability: None    Transportation needs     Medical: None     Non-medical: None   Tobacco Use    Smoking status: Never Smoker    Smokeless tobacco: Never Used   Substance and Sexual Activity    Alcohol use: No    Drug use: No    Sexual activity: Never   Lifestyle    Physical activity     Days per week: None     Minutes per session: None    Stress: None   Relationships    Social connections     Talks on phone: None     Gets together: None     Attends [employer] service: None     Active member of club or organization: None     Attends meetings of clubs or organizations: None     Relationship status: None    Intimate partner violence     Fear of current or ex partner: None     Emotionally abused: None     Physically abused: None     Forced sexual activity: None   Other Topics Concern    None   Social History Narrative    None      Family History   Problem Relation Age of Onset    High Blood Pressure Mother     High Blood Pressure Father     Crohn's Disease Father     Breast Cancer Maternal Grandmother     Heart Disease Maternal Grandmother     Cancer Maternal Grandmother         Breast cancer    Breast Cancer Paternal Grandmother     Cancer Paternal Grandmother 61        Breast cancer    Heart Disease Maternal Grandfather     Kidney Disease Maternal Grandfather     Diabetes Maternal Grandfather     Thyroid Disease Paternal Grandfather     Alzheimer's Disease Paternal Grandfather         PHYSICAL EXAM   VITAL SIGNS: /82   Pulse 71   Temp 98.3 °F (36.8 °C) (Oral)   Resp 16   Wt 210 lb (95.3 kg)   LMP 03/31/2021   SpO2 100%   BMI 30.13 kg/m²    Constitutional: Well developed, well nourished, mild acute distress  Eyes: Sclera nonicteric, conjunctiva moist   HENT: Atraumatic, nose normal   Neck: Supple, no JVD   Respiratory: No retractions, no accessory muscle use, normal breath sounds   Cardiovascular: Normal rate, normal rhythm, no murmurs   GI: Soft, moderate left lower abdominal tenderness, no guarding, bowel sounds present, no audible bruits or palpable pulsatile masses   Musculoskeletal: No edema, no deformity   Vascular: DP pulses 2+ equal bilaterally   Integument: No rash, dry skin   Neurologic: Alert & oriented, normal speech   Psychiatric: Cooperative, pleasant affect     RADIOLOGY/PROCEDURES   US NON OB TRANSVAGINAL   Final Result       1. Diminished vascularity in the left ovary. The patient experienced significant pain during examination of the left ovary. There is free fluid present. These findings could represent a partial or intermittent torsion, please correlate clinically. These    findings were also noted on previous ultrasound scan dated 14 February 2021   2. Normal uterus and right ovary. 3. Otherwise negative pelvic ultrasound. .               **This report has been created using voice recognition software. It may contain minor errors which are inherent in voice recognition technology. **      Final report electronically signed by DR Ana Haque on 4/4/2021 7:19 PM      US DUP ABD PEL RETRO SCROT COMPLETE   Final Result       1. Diminished vascularity in the left ovary. The patient experienced significant pain during examination of the left ovary. There is free fluid present. These findings could represent a partial or intermittent torsion, please correlate clinically. These    findings were also noted on previous ultrasound scan dated 14 February 2021   2. Normal uterus and right ovary. 3. Otherwise negative pelvic ultrasound. .               **This report has been created using voice recognition software. It may contain minor errors which are inherent in voice recognition technology. **      Final report electronically signed by DR Alan Parker on 4/4/2021 7:19 PM           LABS   Labs Reviewed   BASIC METABOLIC PANEL - Abnormal; Notable for the following components:       Result Value    CO2 21 (*)     All other components within normal limits   OSMOLALITY - Abnormal; Notable for the following components:    Osmolality Calc 272.9 (*)     All other components within normal limits   CBC WITH AUTO DIFFERENTIAL   HCG, SERUM, QUALITATIVE   ANION GAP   GLOMERULAR FILTRATION RATE, ESTIMATED   URINALYSIS WITH MICROSCOPIC        ED COURSE & MEDICAL DECISION MAKING   Pertinent Labs & Imaging studies reviewed and interpreted. (See chart for details)   See EMR for medications prescribed   Vitals:    04/04/21 2049   BP: 121/82   Pulse: 71   Resp: 16   Temp:    SpO2: 100%        Differential diagnosis: ovarian torsion vs cyst, other     FINAL IMPRESSION   1. Torsion of left ovary and ovarian pedicle    2. Abdominal pain, left lower quadrant         PLAN   MDM - pt does appear to have intermittent ovarian torsion; however the US did demonstrate ovarian flow. Prior to my calling Dr. Sharlet Scheuermann (OB on call), he came to the room and discussed options with the patient and her boyfriend. Pt's pain is improved. Via shared decision making, pt decided to go home with close follow-up with OB this week. She has pain medications at home from her last admission which she could take for pain control. I gave pt anticipatory guidance on when to return if worse.      Electronically signed by: Juve Mariscal MD, 4/4/2021 9:29 PM   (This note was completed with a voice recognition program)         Preeti Lamb MD  04/04/21 2129

## 2021-04-05 ENCOUNTER — HOSPITAL ENCOUNTER (OUTPATIENT)
Age: 26
Setting detail: OBSERVATION
Discharge: HOME | End: 2021-04-06
Attending: EMERGENCY MEDICINE | Admitting: OBSTETRICS & GYNECOLOGY
Payer: COMMERCIAL

## 2021-04-05 ENCOUNTER — TELEPHONE (OUTPATIENT)
Dept: FAMILY MEDICINE CLINIC | Age: 26
End: 2021-04-05

## 2021-04-05 DIAGNOSIS — N83.519 OVARIAN TORSION: ICD-10-CM

## 2021-04-05 DIAGNOSIS — N83.512 TORSION OF LEFT OVARY: Primary | ICD-10-CM

## 2021-04-05 DIAGNOSIS — Z98.890 S/P LAPAROSCOPY: ICD-10-CM

## 2021-04-05 LAB
ALBUMIN SERPL-MCNC: 4.3 G/DL (ref 3.5–5.1)
ALP BLD-CCNC: 66 U/L (ref 38–126)
ALT SERPL-CCNC: 21 U/L (ref 11–66)
ANION GAP SERPL CALCULATED.3IONS-SCNC: 11 MEQ/L (ref 8–16)
AST SERPL-CCNC: 18 U/L (ref 5–40)
BASOPHILS # BLD: 0.5 %
BASOPHILS ABSOLUTE: 0 THOU/MM3 (ref 0–0.1)
BILIRUB SERPL-MCNC: 0.3 MG/DL (ref 0.3–1.2)
BUN BLDV-MCNC: 9 MG/DL (ref 7–22)
CALCIUM SERPL-MCNC: 9.1 MG/DL (ref 8.5–10.5)
CHLORIDE BLD-SCNC: 103 MEQ/L (ref 98–111)
CO2: 24 MEQ/L (ref 23–33)
CREAT SERPL-MCNC: 0.8 MG/DL (ref 0.4–1.2)
EOSINOPHIL # BLD: 2.7 %
EOSINOPHILS ABSOLUTE: 0.2 THOU/MM3 (ref 0–0.4)
ERYTHROCYTE [DISTWIDTH] IN BLOOD BY AUTOMATED COUNT: 12.4 % (ref 11.5–14.5)
ERYTHROCYTE [DISTWIDTH] IN BLOOD BY AUTOMATED COUNT: 39.8 FL (ref 35–45)
GFR SERPL CREATININE-BSD FRML MDRD: 88 ML/MIN/1.73M2
GLUCOSE BLD-MCNC: 79 MG/DL (ref 70–108)
HCT VFR BLD CALC: 47.1 % (ref 37–47)
HEMOGLOBIN: 16 GM/DL (ref 12–16)
IMMATURE GRANS (ABS): 0.02 THOU/MM3 (ref 0–0.07)
IMMATURE GRANULOCYTES: 0.3 %
LIPASE: 23.8 U/L (ref 5.6–51.3)
LYMPHOCYTES # BLD: 43.8 %
LYMPHOCYTES ABSOLUTE: 2.9 THOU/MM3 (ref 1–4.8)
MCH RBC QN AUTO: 29.9 PG (ref 26–33)
MCHC RBC AUTO-ENTMCNC: 34 GM/DL (ref 32.2–35.5)
MCV RBC AUTO: 87.9 FL (ref 81–99)
MONOCYTES # BLD: 6.9 %
MONOCYTES ABSOLUTE: 0.5 THOU/MM3 (ref 0.4–1.3)
NUCLEATED RED BLOOD CELLS: 0 /100 WBC
OSMOLALITY CALCULATION: 273.3 MOSMOL/KG (ref 275–300)
PLATELET # BLD: 275 THOU/MM3 (ref 130–400)
PMV BLD AUTO: 10.4 FL (ref 9.4–12.4)
POTASSIUM REFLEX MAGNESIUM: 4.1 MEQ/L (ref 3.5–5.2)
RBC # BLD: 5.36 MILL/MM3 (ref 4.2–5.4)
SEG NEUTROPHILS: 45.8 %
SEGMENTED NEUTROPHILS ABSOLUTE COUNT: 3.1 THOU/MM3 (ref 1.8–7.7)
SODIUM BLD-SCNC: 138 MEQ/L (ref 135–145)
TOTAL PROTEIN: 7.5 G/DL (ref 6.1–8)
WBC # BLD: 6.7 THOU/MM3 (ref 4.8–10.8)

## 2021-04-05 PROCEDURE — 96374 THER/PROPH/DIAG INJ IV PUSH: CPT

## 2021-04-05 PROCEDURE — 83690 ASSAY OF LIPASE: CPT

## 2021-04-05 PROCEDURE — 81003 URINALYSIS AUTO W/O SCOPE: CPT

## 2021-04-05 PROCEDURE — 96361 HYDRATE IV INFUSION ADD-ON: CPT

## 2021-04-05 PROCEDURE — 85025 COMPLETE CBC W/AUTO DIFF WBC: CPT

## 2021-04-05 PROCEDURE — 36415 COLL VENOUS BLD VENIPUNCTURE: CPT

## 2021-04-05 PROCEDURE — 80053 COMPREHEN METABOLIC PANEL: CPT

## 2021-04-05 PROCEDURE — 99282 EMERGENCY DEPT VISIT SF MDM: CPT

## 2021-04-05 RX ORDER — SODIUM CHLORIDE 9 MG/ML
INJECTION, SOLUTION INTRAVENOUS CONTINUOUS
Status: DISCONTINUED | OUTPATIENT
Start: 2021-04-06 | End: 2021-04-06 | Stop reason: SDUPTHER

## 2021-04-05 RX ORDER — MORPHINE SULFATE 4 MG/ML
4 INJECTION, SOLUTION INTRAMUSCULAR; INTRAVENOUS ONCE
Status: COMPLETED | OUTPATIENT
Start: 2021-04-06 | End: 2021-04-06

## 2021-04-05 ASSESSMENT — PAIN DESCRIPTION - LOCATION: LOCATION: ABDOMEN

## 2021-04-05 ASSESSMENT — PAIN SCALES - GENERAL: PAINLEVEL_OUTOF10: 10

## 2021-04-05 ASSESSMENT — PAIN DESCRIPTION - PAIN TYPE: TYPE: ACUTE PAIN

## 2021-04-05 NOTE — ED NOTES
Discharge instructions and new medications discussed and explained with Pt. Pt. Verbalized understanding and has no further questions or needs at this time.         Lexus Bustillo RN  04/04/21 8233

## 2021-04-05 NOTE — H&P
[address] [place] [address]                              HISTORY AND PHYSICAL    PATIENT NAME: Chele Frances                     :        1995  MED REC NO:   [id number]                           ROOM:       005  ACCOUNT NO:   [de-identified]                           ADMIT DATE: 2021  PROVIDER:     Carmenza Nye. Chiara Weston M.D.    Sadie Petersen:  Intermittent ovarian torsion on the left. HISTORY OF PRESENT ILLNESS:  The patient is a 61-year-old  who  underwent operative laparoscopy on 2021 for an ovarian torsion on  the left. At that time, the ovary had torsed once and was untorsed with  good blood supply and she was then discharged to home. She reported  that the pain started worsening today, , and then became acutely  worse and then presented to the emergency room. The patient did have a  workup and had a repeat ultrasound. The ultrasound was essentially the  same from her previous ultrasound. Left ovary measured approximately  4.5 x 4.5 x 6 with small follicles, but no discrete masses. There was  flow, but they thought it was somewhat diminished in the left ovary. The right adnexa was viewed as normal.  I personally came in to examine  and evaluate the patient in the emergency room. PAST MEDICAL HISTORY:  Has a history of ovarian torsion. PAST SURGICAL HISTORY:  She had an ACL repair in , tympanostomies in  , and laparoscopy with detorsion of her left ovary in 2021. MEDICATIONS:  Currently takes birth control pills. ALLERGIES:  She has no known drug allergies. FAMILY HISTORY:  Noncontributory. SOCIAL HISTORY:  Negative x3. She is engaged. PHYSICAL EXAMINATION:  VITAL SIGNS:  The patient is 5 feet 9 inches, 200 pounds. HEENT:  Normocephalic, atraumatic. HEART:  Regular rate and rhythm. LUNGS:  Clear.   ABDOMEN:  She is slightly tender in the left lower

## 2021-04-05 NOTE — LETTER
[address]  [facility] [address]  Phone: [phone]    No name on file. April 6, 2021     Patient: Cecilia Telles   YOB: 1995   Date of Visit: 4/5/2021       To Whom It May Concern: It is my medical opinion that Jocy Lo should remain out of work until released by surgeon. Follow up appointment 4-13-21. If you have any questions or concerns, please don't hesitate to call. Sincerely,    T/o C. SturgeonRN/ REYMUNDO Doe MD

## 2021-04-05 NOTE — ED NOTES
Pt. Resting in bed with even and unlabored respirations. Pt. States pain is a 7/10 at this time. Pt. Updated about plan for pain medication. Family at bedside. Pt. Has no further concerns, questions or needs at this time. Call light within reach.         Nakul Funes RN  04/04/21 2050

## 2021-04-06 ENCOUNTER — ANESTHESIA (OUTPATIENT)
Dept: OPERATING ROOM | Age: 26
End: 2021-04-06
Payer: COMMERCIAL

## 2021-04-06 ENCOUNTER — ANESTHESIA EVENT (OUTPATIENT)
Dept: OPERATING ROOM | Age: 26
End: 2021-04-06
Payer: COMMERCIAL

## 2021-04-06 VITALS
DIASTOLIC BLOOD PRESSURE: 81 MMHG | WEIGHT: 216 LBS | TEMPERATURE: 98.3 F | OXYGEN SATURATION: 96 % | HEIGHT: 68 IN | BODY MASS INDEX: 32.74 KG/M2 | SYSTOLIC BLOOD PRESSURE: 113 MMHG | HEART RATE: 85 BPM | RESPIRATION RATE: 16 BRPM

## 2021-04-06 VITALS — SYSTOLIC BLOOD PRESSURE: 108 MMHG | TEMPERATURE: 97.3 F | OXYGEN SATURATION: 99 % | DIASTOLIC BLOOD PRESSURE: 65 MMHG

## 2021-04-06 PROBLEM — N83.512 TORSION OF LEFT OVARY: Status: ACTIVE | Noted: 2021-04-06

## 2021-04-06 LAB
BILIRUBIN URINE: NEGATIVE
BLOOD, URINE: NEGATIVE
CHARACTER, URINE: CLEAR
COLOR: YELLOW
GLUCOSE URINE: NEGATIVE MG/DL
KETONES, URINE: NEGATIVE
LEUKOCYTE ESTERASE, URINE: NEGATIVE
NITRITE, URINE: NEGATIVE
PH UA: 5.5 (ref 5–9)
PROTEIN UA: NEGATIVE
SARS-COV-2, NAAT: NOT DETECTED
SPECIFIC GRAVITY, URINE: 1.03 (ref 1–1.03)
UROBILINOGEN, URINE: 0.2 EU/DL (ref 0–1)

## 2021-04-06 PROCEDURE — 88305 TISSUE EXAM BY PATHOLOGIST: CPT

## 2021-04-06 PROCEDURE — 96376 TX/PRO/DX INJ SAME DRUG ADON: CPT

## 2021-04-06 PROCEDURE — 2580000003 HC RX 258: Performed by: OBSTETRICS & GYNECOLOGY

## 2021-04-06 PROCEDURE — 6370000000 HC RX 637 (ALT 250 FOR IP): Performed by: OBSTETRICS & GYNECOLOGY

## 2021-04-06 PROCEDURE — 2500000003 HC RX 250 WO HCPCS: Performed by: OBSTETRICS & GYNECOLOGY

## 2021-04-06 PROCEDURE — G0378 HOSPITAL OBSERVATION PER HR: HCPCS

## 2021-04-06 PROCEDURE — 6360000002 HC RX W HCPCS: Performed by: NURSE ANESTHETIST, CERTIFIED REGISTERED

## 2021-04-06 PROCEDURE — 3600000004 HC SURGERY LEVEL 4 BASE: Performed by: OBSTETRICS & GYNECOLOGY

## 2021-04-06 PROCEDURE — 2709999900 HC NON-CHARGEABLE SUPPLY: Performed by: OBSTETRICS & GYNECOLOGY

## 2021-04-06 PROCEDURE — 87635 SARS-COV-2 COVID-19 AMP PRB: CPT

## 2021-04-06 PROCEDURE — 6360000002 HC RX W HCPCS: Performed by: OBSTETRICS & GYNECOLOGY

## 2021-04-06 PROCEDURE — 2580000003 HC RX 258: Performed by: EMERGENCY MEDICINE

## 2021-04-06 PROCEDURE — 6360000002 HC RX W HCPCS: Performed by: EMERGENCY MEDICINE

## 2021-04-06 PROCEDURE — 2500000003 HC RX 250 WO HCPCS: Performed by: NURSE ANESTHETIST, CERTIFIED REGISTERED

## 2021-04-06 PROCEDURE — 3600000014 HC SURGERY LEVEL 4 ADDTL 15MIN: Performed by: OBSTETRICS & GYNECOLOGY

## 2021-04-06 PROCEDURE — 2720000010 HC SURG SUPPLY STERILE: Performed by: OBSTETRICS & GYNECOLOGY

## 2021-04-06 PROCEDURE — 3700000000 HC ANESTHESIA ATTENDED CARE: Performed by: OBSTETRICS & GYNECOLOGY

## 2021-04-06 PROCEDURE — 96375 TX/PRO/DX INJ NEW DRUG ADDON: CPT

## 2021-04-06 PROCEDURE — 3700000001 HC ADD 15 MINUTES (ANESTHESIA): Performed by: OBSTETRICS & GYNECOLOGY

## 2021-04-06 PROCEDURE — 7100000000 HC PACU RECOVERY - FIRST 15 MIN: Performed by: OBSTETRICS & GYNECOLOGY

## 2021-04-06 PROCEDURE — 96374 THER/PROPH/DIAG INJ IV PUSH: CPT

## 2021-04-06 PROCEDURE — 7100000001 HC PACU RECOVERY - ADDTL 15 MIN: Performed by: OBSTETRICS & GYNECOLOGY

## 2021-04-06 RX ORDER — FENTANYL CITRATE 50 UG/ML
INJECTION, SOLUTION INTRAMUSCULAR; INTRAVENOUS PRN
Status: DISCONTINUED | OUTPATIENT
Start: 2021-04-06 | End: 2021-04-06 | Stop reason: SDUPTHER

## 2021-04-06 RX ORDER — OXYCODONE HYDROCHLORIDE AND ACETAMINOPHEN 5; 325 MG/1; MG/1
1 TABLET ORAL EVERY 4 HOURS PRN
Status: DISCONTINUED | OUTPATIENT
Start: 2021-04-06 | End: 2021-04-06 | Stop reason: HOSPADM

## 2021-04-06 RX ORDER — MIDAZOLAM HYDROCHLORIDE 2 MG/2ML
INJECTION, SOLUTION INTRAMUSCULAR; INTRAVENOUS PRN
Status: DISCONTINUED | OUTPATIENT
Start: 2021-04-06 | End: 2021-04-06 | Stop reason: SDUPTHER

## 2021-04-06 RX ORDER — SODIUM CHLORIDE 0.9 % (FLUSH) 0.9 %
10 SYRINGE (ML) INJECTION EVERY 12 HOURS SCHEDULED
Status: DISCONTINUED | OUTPATIENT
Start: 2021-04-06 | End: 2021-04-06 | Stop reason: HOSPADM

## 2021-04-06 RX ORDER — LABETALOL 20 MG/4 ML (5 MG/ML) INTRAVENOUS SYRINGE
10 EVERY 10 MIN PRN
Status: DISCONTINUED | OUTPATIENT
Start: 2021-04-06 | End: 2021-04-06 | Stop reason: HOSPADM

## 2021-04-06 RX ORDER — MORPHINE SULFATE 4 MG/ML
4 INJECTION, SOLUTION INTRAMUSCULAR; INTRAVENOUS
Status: DISCONTINUED | OUTPATIENT
Start: 2021-04-06 | End: 2021-04-06 | Stop reason: HOSPADM

## 2021-04-06 RX ORDER — MORPHINE SULFATE 4 MG/ML
4 INJECTION, SOLUTION INTRAMUSCULAR; INTRAVENOUS ONCE
Status: COMPLETED | OUTPATIENT
Start: 2021-04-06 | End: 2021-04-06

## 2021-04-06 RX ORDER — PROPOFOL 10 MG/ML
INJECTION, EMULSION INTRAVENOUS PRN
Status: DISCONTINUED | OUTPATIENT
Start: 2021-04-06 | End: 2021-04-06 | Stop reason: SDUPTHER

## 2021-04-06 RX ORDER — ONDANSETRON 2 MG/ML
INJECTION INTRAMUSCULAR; INTRAVENOUS PRN
Status: DISCONTINUED | OUTPATIENT
Start: 2021-04-06 | End: 2021-04-06 | Stop reason: SDUPTHER

## 2021-04-06 RX ORDER — SODIUM CHLORIDE 0.9 % (FLUSH) 0.9 %
10 SYRINGE (ML) INJECTION PRN
Status: DISCONTINUED | OUTPATIENT
Start: 2021-04-06 | End: 2021-04-06 | Stop reason: HOSPADM

## 2021-04-06 RX ORDER — KETOROLAC TROMETHAMINE 30 MG/ML
30 INJECTION, SOLUTION INTRAMUSCULAR; INTRAVENOUS EVERY 6 HOURS
Status: DISCONTINUED | OUTPATIENT
Start: 2021-04-06 | End: 2021-04-06 | Stop reason: HOSPADM

## 2021-04-06 RX ORDER — ROCURONIUM BROMIDE 10 MG/ML
INJECTION, SOLUTION INTRAVENOUS PRN
Status: DISCONTINUED | OUTPATIENT
Start: 2021-04-06 | End: 2021-04-06 | Stop reason: SDUPTHER

## 2021-04-06 RX ORDER — IBUPROFEN 200 MG
600 TABLET ORAL EVERY 6 HOURS PRN
Status: DISCONTINUED | OUTPATIENT
Start: 2021-04-06 | End: 2021-04-06 | Stop reason: DRUGHIGH

## 2021-04-06 RX ORDER — ONDANSETRON 4 MG/1
4 TABLET, ORALLY DISINTEGRATING ORAL EVERY 6 HOURS PRN
Qty: 40 TABLET | Refills: 3 | Status: SHIPPED | OUTPATIENT
Start: 2021-04-06 | End: 2022-07-07 | Stop reason: ALTCHOICE

## 2021-04-06 RX ORDER — OXYCODONE HYDROCHLORIDE AND ACETAMINOPHEN 5; 325 MG/1; MG/1
2 TABLET ORAL EVERY 4 HOURS PRN
Status: DISCONTINUED | OUTPATIENT
Start: 2021-04-06 | End: 2021-04-06 | Stop reason: HOSPADM

## 2021-04-06 RX ORDER — DEXAMETHASONE SODIUM PHOSPHATE 10 MG/ML
INJECTION, EMULSION INTRAMUSCULAR; INTRAVENOUS PRN
Status: DISCONTINUED | OUTPATIENT
Start: 2021-04-06 | End: 2021-04-06 | Stop reason: SDUPTHER

## 2021-04-06 RX ORDER — OXYCODONE HYDROCHLORIDE AND ACETAMINOPHEN 5; 325 MG/1; MG/1
1 TABLET ORAL EVERY 6 HOURS PRN
Qty: 20 TABLET | Refills: 0 | Status: SHIPPED | OUTPATIENT
Start: 2021-04-06 | End: 2021-04-13

## 2021-04-06 RX ORDER — MORPHINE SULFATE 2 MG/ML
2 INJECTION, SOLUTION INTRAMUSCULAR; INTRAVENOUS
Status: DISCONTINUED | OUTPATIENT
Start: 2021-04-06 | End: 2021-04-06 | Stop reason: HOSPADM

## 2021-04-06 RX ORDER — FENTANYL CITRATE 50 UG/ML
50 INJECTION, SOLUTION INTRAMUSCULAR; INTRAVENOUS EVERY 5 MIN PRN
Status: DISCONTINUED | OUTPATIENT
Start: 2021-04-06 | End: 2021-04-06 | Stop reason: HOSPADM

## 2021-04-06 RX ORDER — NEOSTIGMINE METHYLSULFATE 5 MG/5 ML
SYRINGE (ML) INTRAVENOUS PRN
Status: DISCONTINUED | OUTPATIENT
Start: 2021-04-06 | End: 2021-04-06 | Stop reason: SDUPTHER

## 2021-04-06 RX ORDER — KETOROLAC TROMETHAMINE 30 MG/ML
INJECTION, SOLUTION INTRAMUSCULAR; INTRAVENOUS PRN
Status: DISCONTINUED | OUTPATIENT
Start: 2021-04-06 | End: 2021-04-06 | Stop reason: SDUPTHER

## 2021-04-06 RX ORDER — OXYCODONE HYDROCHLORIDE AND ACETAMINOPHEN 5; 325 MG/1; MG/1
2 TABLET ORAL EVERY 4 HOURS PRN
Status: DISCONTINUED | OUTPATIENT
Start: 2021-04-06 | End: 2021-04-06 | Stop reason: SDUPTHER

## 2021-04-06 RX ORDER — PROMETHAZINE HYDROCHLORIDE 25 MG/ML
12.5 INJECTION, SOLUTION INTRAMUSCULAR; INTRAVENOUS
Status: DISCONTINUED | OUTPATIENT
Start: 2021-04-06 | End: 2021-04-06 | Stop reason: HOSPADM

## 2021-04-06 RX ORDER — KETOROLAC TROMETHAMINE 10 MG/1
10 TABLET, FILM COATED ORAL EVERY 6 HOURS PRN
Qty: 20 TABLET | Refills: 1 | Status: SHIPPED | OUTPATIENT
Start: 2021-04-06 | End: 2022-07-07

## 2021-04-06 RX ORDER — HYDROCODONE BITARTRATE AND ACETAMINOPHEN 5; 325 MG/1; MG/1
1 TABLET ORAL EVERY 6 HOURS PRN
COMMUNITY
End: 2021-04-07

## 2021-04-06 RX ORDER — GLYCOPYRROLATE 1 MG/5 ML
SYRINGE (ML) INTRAVENOUS PRN
Status: DISCONTINUED | OUTPATIENT
Start: 2021-04-06 | End: 2021-04-06 | Stop reason: SDUPTHER

## 2021-04-06 RX ORDER — IBUPROFEN 800 MG/1
800 TABLET ORAL EVERY 8 HOURS PRN
Status: DISCONTINUED | OUTPATIENT
Start: 2021-04-08 | End: 2021-04-06 | Stop reason: HOSPADM

## 2021-04-06 RX ORDER — HYDROMORPHONE HCL 110MG/55ML
PATIENT CONTROLLED ANALGESIA SYRINGE INTRAVENOUS PRN
Status: DISCONTINUED | OUTPATIENT
Start: 2021-04-06 | End: 2021-04-06 | Stop reason: SDUPTHER

## 2021-04-06 RX ORDER — MORPHINE SULFATE 4 MG/ML
INJECTION, SOLUTION INTRAMUSCULAR; INTRAVENOUS
Status: DISCONTINUED
Start: 2021-04-06 | End: 2021-04-06 | Stop reason: HOSPADM

## 2021-04-06 RX ORDER — SODIUM CHLORIDE, SODIUM LACTATE, POTASSIUM CHLORIDE, CALCIUM CHLORIDE 600; 310; 30; 20 MG/100ML; MG/100ML; MG/100ML; MG/100ML
INJECTION, SOLUTION INTRAVENOUS CONTINUOUS
Status: DISCONTINUED | OUTPATIENT
Start: 2021-04-06 | End: 2021-04-06 | Stop reason: HOSPADM

## 2021-04-06 RX ORDER — SODIUM CHLORIDE 9 MG/ML
INJECTION, SOLUTION INTRAVENOUS CONTINUOUS
Status: DISCONTINUED | OUTPATIENT
Start: 2021-04-06 | End: 2021-04-06 | Stop reason: HOSPADM

## 2021-04-06 RX ORDER — IBUPROFEN 800 MG/1
800 TABLET ORAL EVERY 8 HOURS PRN
Status: DISCONTINUED | OUTPATIENT
Start: 2021-04-06 | End: 2021-04-06 | Stop reason: SDUPTHER

## 2021-04-06 RX ORDER — BUPIVACAINE HYDROCHLORIDE 5 MG/ML
INJECTION, SOLUTION EPIDURAL; INTRACAUDAL PRN
Status: DISCONTINUED | OUTPATIENT
Start: 2021-04-06 | End: 2021-04-06 | Stop reason: ALTCHOICE

## 2021-04-06 RX ORDER — OXYCODONE HYDROCHLORIDE AND ACETAMINOPHEN 5; 325 MG/1; MG/1
1 TABLET ORAL EVERY 4 HOURS PRN
Status: DISCONTINUED | OUTPATIENT
Start: 2021-04-06 | End: 2021-04-06 | Stop reason: SDUPTHER

## 2021-04-06 RX ORDER — LIDOCAINE HCL/PF 100 MG/5ML
SYRINGE (ML) INJECTION PRN
Status: DISCONTINUED | OUTPATIENT
Start: 2021-04-06 | End: 2021-04-06 | Stop reason: SDUPTHER

## 2021-04-06 RX ADMIN — Medication 0.6 MG: at 11:05

## 2021-04-06 RX ADMIN — HYDROMORPHONE HYDROCHLORIDE 1 MG: 1 INJECTION, SOLUTION INTRAMUSCULAR; INTRAVENOUS; SUBCUTANEOUS at 08:58

## 2021-04-06 RX ADMIN — Medication 60 MG: at 10:10

## 2021-04-06 RX ADMIN — PROPOFOL 150 MG: 10 INJECTION, EMULSION INTRAVENOUS at 10:10

## 2021-04-06 RX ADMIN — IBUPROFEN 600 MG: 200 TABLET, FILM COATED ORAL at 05:13

## 2021-04-06 RX ADMIN — DEXAMETHASONE SODIUM PHOSPHATE 10 MG: 10 INJECTION, EMULSION INTRAMUSCULAR; INTRAVENOUS at 10:22

## 2021-04-06 RX ADMIN — SODIUM CHLORIDE: 9 INJECTION, SOLUTION INTRAVENOUS at 00:05

## 2021-04-06 RX ADMIN — SODIUM CHLORIDE, POTASSIUM CHLORIDE, SODIUM LACTATE AND CALCIUM CHLORIDE 1000 ML: 600; 310; 30; 20 INJECTION, SOLUTION INTRAVENOUS at 05:38

## 2021-04-06 RX ADMIN — SODIUM CHLORIDE, POTASSIUM CHLORIDE, SODIUM LACTATE AND CALCIUM CHLORIDE: 600; 310; 30; 20 INJECTION, SOLUTION INTRAVENOUS at 10:31

## 2021-04-06 RX ADMIN — Medication 4 MG: at 11:05

## 2021-04-06 RX ADMIN — ROCURONIUM BROMIDE 30 MG: 10 INJECTION INTRAVENOUS at 10:10

## 2021-04-06 RX ADMIN — HYDROMORPHONE HYDROCHLORIDE 0.5 MG: 2 INJECTION INTRAMUSCULAR; INTRAVENOUS; SUBCUTANEOUS at 10:43

## 2021-04-06 RX ADMIN — OXYCODONE HYDROCHLORIDE AND ACETAMINOPHEN 2 TABLET: 5; 325 TABLET ORAL at 11:50

## 2021-04-06 RX ADMIN — ONDANSETRON HYDROCHLORIDE 4 MG: 4 INJECTION, SOLUTION INTRAMUSCULAR; INTRAVENOUS at 10:22

## 2021-04-06 RX ADMIN — SODIUM CHLORIDE, POTASSIUM CHLORIDE, SODIUM LACTATE AND CALCIUM CHLORIDE: 600; 310; 30; 20 INJECTION, SOLUTION INTRAVENOUS at 11:05

## 2021-04-06 RX ADMIN — MORPHINE SULFATE 4 MG: 4 INJECTION, SOLUTION INTRAMUSCULAR; INTRAVENOUS at 06:53

## 2021-04-06 RX ADMIN — FENTANYL CITRATE 100 MCG: 50 INJECTION, SOLUTION INTRAMUSCULAR; INTRAVENOUS at 10:10

## 2021-04-06 RX ADMIN — ROCURONIUM BROMIDE 20 MG: 10 INJECTION INTRAVENOUS at 10:35

## 2021-04-06 RX ADMIN — KETOROLAC TROMETHAMINE 30 MG: 30 INJECTION, SOLUTION INTRAMUSCULAR at 11:05

## 2021-04-06 RX ADMIN — MORPHINE SULFATE 4 MG: 4 INJECTION, SOLUTION INTRAMUSCULAR; INTRAVENOUS at 00:03

## 2021-04-06 RX ADMIN — MIDAZOLAM HYDROCHLORIDE 2 MG: 1 INJECTION, SOLUTION INTRAMUSCULAR; INTRAVENOUS at 10:06

## 2021-04-06 RX ADMIN — OXYCODONE HYDROCHLORIDE AND ACETAMINOPHEN 2 TABLET: 5; 325 TABLET ORAL at 02:15

## 2021-04-06 ASSESSMENT — PULMONARY FUNCTION TESTS
PIF_VALUE: 20
PIF_VALUE: 21
PIF_VALUE: 19
PIF_VALUE: 21
PIF_VALUE: 16
PIF_VALUE: 20
PIF_VALUE: 20
PIF_VALUE: 1
PIF_VALUE: 19
PIF_VALUE: 13
PIF_VALUE: 1
PIF_VALUE: 16
PIF_VALUE: 16
PIF_VALUE: 0
PIF_VALUE: 20
PIF_VALUE: 15
PIF_VALUE: 16
PIF_VALUE: 0
PIF_VALUE: 2
PIF_VALUE: 21
PIF_VALUE: 0
PIF_VALUE: 16
PIF_VALUE: 14
PIF_VALUE: 15
PIF_VALUE: 21
PIF_VALUE: 20
PIF_VALUE: 0
PIF_VALUE: 21
PIF_VALUE: 13
PIF_VALUE: 17
PIF_VALUE: 14
PIF_VALUE: 20
PIF_VALUE: 13
PIF_VALUE: 2
PIF_VALUE: 0
PIF_VALUE: 2
PIF_VALUE: 13
PIF_VALUE: 20
PIF_VALUE: 12
PIF_VALUE: 20

## 2021-04-06 ASSESSMENT — PAIN DESCRIPTION - LOCATION
LOCATION: ABDOMEN

## 2021-04-06 ASSESSMENT — PAIN SCALES - GENERAL
PAINLEVEL_OUTOF10: 5
PAINLEVEL_OUTOF10: 7
PAINLEVEL_OUTOF10: 10
PAINLEVEL_OUTOF10: 0
PAINLEVEL_OUTOF10: 10
PAINLEVEL_OUTOF10: 0
PAINLEVEL_OUTOF10: 8

## 2021-04-06 ASSESSMENT — PAIN DESCRIPTION - PAIN TYPE
TYPE: ACUTE PAIN

## 2021-04-06 ASSESSMENT — ENCOUNTER SYMPTOMS
BACK PAIN: 0
RHINORRHEA: 0
COUGH: 0
EYE PAIN: 0
EYE DISCHARGE: 0
EYE REDNESS: 0
STRIDOR: 0
NAUSEA: 0
PHOTOPHOBIA: 0
CONSTIPATION: 0
VOMITING: 0
EYE ITCHING: 0
WHEEZING: 0
SHORTNESS OF BREATH: 0
SORE THROAT: 0
DIARRHEA: 0
ABDOMINAL PAIN: 1
CHEST TIGHTNESS: 0
ABDOMINAL DISTENTION: 0

## 2021-04-06 ASSESSMENT — PAIN DESCRIPTION - ORIENTATION
ORIENTATION: LEFT;LOWER
ORIENTATION: LOWER;LEFT
ORIENTATION: LOWER

## 2021-04-06 ASSESSMENT — PAIN DESCRIPTION - DESCRIPTORS
DESCRIPTORS: SHARP
DESCRIPTORS: SHARP

## 2021-04-06 NOTE — ED NOTES
Patient to ED for ovarian pain. Patient was seen yesterday and given pain medication. Patient states pain did not improve with the medication.  Patient has OB appointment tomorrow      Qing Newton RN  04/05/21 3681

## 2021-04-06 NOTE — H&P
H&P Update    Patient's History and Physical from my office was reviewed. Patient examined. There has been no change.     Janeth Navarro MD
if the surgery is delayed or that the left  oophorectomy may indeed simply be indicated because of her recurrence  history. In any event, she has opted for observation with pain control  and to be reevaluated by Dr. Nemesio Clark in the hospital rather than in  the office as was previously planned. PAST MEDICAL HISTORY:  Major illnesses are none. PAST SURGICAL HISTORY:  Laparoscopy with reversal of left ovarian  torsion in February of this year, wisdom tooth extraction, an ACL repair  at age 17, tympanostomy tube placement at age [de-identified]. FAMILY HISTORY:  Positive for hypertension, Crohn disease, breast  cancer, heart disease, diabetes, thyroid disease and Alzheimer's. SOCIAL HISTORY:  She is currently engaged. She denies use of tobacco,  drugs or excessive use of alcohol. REVIEW OF SYSTEMS:  Positive for obesity with a BMI slightly greater  than 30. PHYSICAL EXAMINATION:  GENERAL:  A well-developed, well-nourished female, resting fairly  comfortably in bed with the knees bent. CHEST:  Respirations are easy. CARDIOVASCULAR:  Normal.  HEENT:  Normal.  MENTAL STATUS:  Normal.  ABDOMEN:  Tender in the left lower quadrant without significant guarding  or rebound. PELVIC:  Deferred. EXTREMITIES:  Normal.  NEUROLOGIC:  Physiologic. IMPRESSION:  Chronically enlarged left ovary with recurrent torsion. PLAN:  Admission for observation, possible robotic ovarian torsion  procedure on an elective basis versus operative laparoscopy with left  oophorectomy based on her pain status with observation. Consultation  will be made to Dr. Shad Chavez. Jasper to see her in the morning. Ayan Gay M.D.    D: 04/06/2021 0:56:51       T: 04/06/2021 1:02:09     [id number]  Job#: [id number]     Doc#: [id number]    CC:  FRANCISCO Garner M.D.

## 2021-04-06 NOTE — PROGRESS NOTES
9850 [id number] Arouses to name on arrival to PACU , [facility] elevated   1130 awake and oriented , denies any pain   1145 pt resting resp easy   1150 awake on own states pain a # 7 medicated with 2 percocet tabs  1205 resting resp easy   1215 pt states pain tolerable and denies any needs  1218 meets criteria for discharge , transported to [facility]

## 2021-04-06 NOTE — PROGRESS NOTES
Patient returned to [phone] following surgery awake and oriented. Pain is tolerable. 3 sites to abdomen with steri strips and bandaids, all dry and intact. Scant bloody drainage on you pad. 0.9NS infusing into right hand. Family at bedside. Assisting patient ot bathroom to void. SCD's were on bilaterally.

## 2021-04-06 NOTE — ED PROVIDER NOTES
[address] ENCOUNTER      PATIENT NAME: Elham Liu  MRN: [id number]  : 1995  VALDOVINOS: 2021  PROVIDER: Iveth Leslie MD      CHIEF COMPLAINT       Chief Complaint   Patient presents with    Abdominal Pain       Nurses Notes reviewed and I agree except as noted in the HPI. HISTORY OF PRESENT ILLNESS    Elham Liu is a 23 y.o. female who presents to Emergency Department with Abdominal Pain    Left lower quadrant pain since 9 AM yesterday. Moderate to  severe intensity, intermittent pain, pain was getting worse during the day. Pain is deep ache. Patient was seen in ED yesterday for same pain with ultrasound showing left ovarian torsion. OB/GYN went down and saw the patient but her pain got better and patient was discharged later. Currently no fever, no chills. No nausea, no vomiting. She is nulliparous. No vaginal discharge bleeding. US yesterday:    Diminished vascularity in the left ovary. The patient experienced significant pain during examination of the left ovary. There is free fluid present. These findings could represent a partial or intermittent torsion, please correlate clinically. These   findings were also noted on previous ultrasound scan dated 2021  2. Normal uterus and right ovary. 3. Otherwise negative pelvic ultrasound. S/p laparoscopic reversal of left ovarian torsion on 2021 with Dr. Clinton Alba     This HPI was provided by the patient. REVIEW OF SYSTEMS     Review of Systems   Constitutional: Negative for activity change, appetite change, chills, fatigue, fever and unexpected weight change. HENT: Negative for congestion, ear discharge, ear pain, hearing loss, nosebleeds, rhinorrhea and sore throat. Eyes: Negative for photophobia, pain, discharge, redness and itching. Respiratory: Negative for cough, chest tightness, shortness of breath, wheezing and stridor.     Cardiovascular: Negative for chest pain, palpitations and leg swelling. Gastrointestinal: Positive for abdominal pain. Negative for abdominal distention, constipation, diarrhea, nausea and vomiting. Endocrine: Negative for cold intolerance, heat intolerance, polydipsia and polyphagia. Genitourinary: Negative for dysuria, flank pain, frequency and hematuria. Musculoskeletal: Negative for arthralgias, back pain, gait problem, myalgias, neck pain and neck stiffness. Skin: Negative for pallor, rash and wound. Allergic/Immunologic: Negative for environmental allergies and food allergies. Neurological: Negative for dizziness, tremors, syncope, weakness and headaches. Psychiatric/Behavioral: Negative for agitation, behavioral problems, confusion, self-injury, sleep disturbance and suicidal ideas. PAST MEDICAL HISTORY     Past Medical History:   Diagnosis Date    Acne     Dr. Jocy Walker.  Concussion 10/27/2015    Obesity        SURGICAL HISTORY       Past Surgical History:   Procedure Laterality Date    ANTERIOR CRUCIATE LIGAMENT REPAIR  2012    right knee-Dr. Ruma Vines    LAPAROSCOPY N/A 2021    OPERATIVE LAPAROSCOPY, REVERSAL OF LEFT OVARIAN TORSION performed by Juan Ahumada MD at [place] [address]      age 5   [employer] WISDOM TOOTH EXTRACTION Bilateral 5-    Dr. Hany Melissa       Previous Medications    IBUPROFEN (ADVIL;MOTRIN) 200 MG TABLET    Take 200 mg by mouth every 6 hours as needed for Pain    NORETHIN-ETH ESTRAD TRIPHASIC (ARANELLE) 0.5/1/0.5-35 MG-MCG TABS    TAKE 1 TABLET BY MOUTH EVERY DAY       ALLERGIES     Cefdinir    FAMILY HISTORY     She indicated that her mother is alive. She indicated that her father is alive. She indicated that her maternal grandmother is alive. She indicated that her maternal grandfather is alive. She indicated that her paternal grandmother is . She indicated that her paternal grandfather is alive.    family history includes Alzheimer's Disease in her paternal grandfather; Breast Cancer in her maternal grandmother and paternal grandmother; Cancer in her maternal grandmother; Cancer (age of onset: 61) in her paternal grandmother; Crohn's Disease in her father; Diabetes in her maternal grandfather; Heart Disease in her maternal grandfather and maternal grandmother; High Blood Pressure in her father and mother; Kidney Disease in her maternal grandfather; Thyroid Disease in her paternal grandfather. SOCIAL HISTORY      reports that she has never smoked. She has never used smokeless tobacco. She reports that she does not drink alcohol or use drugs. PHYSICAL EXAM     INITIAL VITALS:    height is 5' 10\" (1.778 m) and weight is 210 lb (95.3 kg). Her oral temperature is 98.3 °F (36.8 °C). Her blood pressure is 123/89 and her pulse is 90. Her respiration is 16 and oxygen saturation is 96%. Physical Exam  Vitals signs and nursing note reviewed. Constitutional:       Appearance: She is well-developed. She is not diaphoretic. HENT:      Head: Normocephalic and atraumatic. Nose: Nose normal.   Eyes:      General: No scleral icterus. Right eye: No discharge. Left eye: No discharge. Conjunctiva/sclera: Conjunctivae normal.      Pupils: Pupils are equal, round, and reactive to light. Neck:      Musculoskeletal: Normal range of motion and neck supple. Vascular: No JVD. Trachea: No tracheal deviation. Cardiovascular:      Rate and Rhythm: Normal rate and regular rhythm. Heart sounds: Normal heart sounds. No murmur. No friction rub. No gallop. Pulmonary:      Effort: Pulmonary effort is normal. No respiratory distress. Breath sounds: Normal breath sounds. No stridor. No wheezing or rales. Chest:      Chest wall: No tenderness. Abdominal:      General: Bowel sounds are normal. There is no distension. Palpations: Abdomen is soft. There is no mass. Tenderness:  There is abdominal Stable. VITALS IN ED  Vitals:    04/05/21 2342 04/06/21 0030 04/06/21 0045 04/06/21 0100   BP: 119/87 116/84 127/86 123/89   Pulse: 90      Resp: 16   16   Temp:       TempSrc:       SpO2: 100% 98% 98% 96%   Weight:       Height:           FINAL IMPRESSION      1. Torsion of left ovary          DISPOSITION/PLAN   Admit for observation    PATIENT REFERRED TO:  No follow-up provider specified.     DISCHARGE MEDICATIONS:  New Prescriptions    No medications on file       (Please note that portions of this note were completed with a voice recognition program.  Efforts were made to edit the dictations but occasionally words aremis-transcribed.)    Michaeline Mortimer, MD Michaeline Mortimer, MD  04/06/21 6255

## 2021-04-06 NOTE — ED NOTES
Dr Iza Og paged     Jose Francisco Orozco, RN  04/06/21 Breeden At [address] [place] [address], RN  04/06/21 6578

## 2021-04-06 NOTE — ANESTHESIA PRE PROCEDURE
04/05/2021       CMP:   Lab Results   Component Value Date     04/05/2021    K 4.1 04/05/2021     04/05/2021    CO2 24 04/05/2021    BUN 9 04/05/2021    CREATININE 0.8 04/05/2021    LABGLOM 88 04/05/2021    GLUCOSE 79 04/05/2021    PROT 7.5 04/05/2021    CALCIUM 9.1 04/05/2021    BILITOT 0.3 04/05/2021    ALKPHOS 66 04/05/2021    AST 18 04/05/2021    ALT 21 04/05/2021       POC Tests: No results for input(s): POCGLU, POCNA, POCK, POCCL, POCBUN, POCHEMO, POCHCT in the last 72 hours. Coags:   Lab Results   Component Value Date    INR 1.07 04/13/2016    APTT 30.8 04/13/2016       HCG (If Applicable):   Lab Results   Component Value Date    PREGTESTUR NEGATIVE 06/21/2017    PREGSERUM NEGATIVE 04/04/2021        ABGs: No results found for: PHART, PO2ART, HFM2ROA, UYE5WFM, BEART, N3CKHGSH     Type & Screen (If Applicable):  No results found for: LABABO, LABRH    Drug/Infectious Status (If Applicable):  No results found for: HIV, HEPCAB    COVID-19 Screening (If Applicable):   Lab Results   Component Value Date    COVID19 NOT DETECTED 04/06/2021           Anesthesia Evaluation  Patient summary reviewed  Airway: Mallampati: II  TM distance: >3 FB   Neck ROM: full  Mouth opening: > = 3 FB Dental:          Pulmonary:                              Cardiovascular:                      Neuro/Psych:               GI/Hepatic/Renal:             Endo/Other:                     Abdominal:           Vascular:                                        Anesthesia Plan      general     ASA 1       Induction: rapid sequence and intravenous. MIPS: Postoperative opioids intended and Prophylactic antiemetics administered. Anesthetic plan and risks discussed with patient. Plan discussed with EILEEN Dunaway.  [address] [facility]   4/6/2021

## 2021-04-06 NOTE — ED NOTES
Patient called out crying and stating \"pain is worse. I can't take it anymore. Just take it out\" Patient reassured.       Yashira Arnold RN  04/06/21 8314

## 2021-04-06 NOTE — ANESTHESIA POSTPROCEDURE EVALUATION
Department of Anesthesiology  Postprocedure Note    Patient: Marcial Chow  MRN: [id number]  YOB: 1995  Date of evaluation: 4/6/2021  Time:  1:48 PM     Procedure Summary     Date: 04/06/21 Room / Location: [address]    Anesthesia Start: 1007 Anesthesia Stop: 3752    Procedure: LEFT LAPAROSCOPIC OOPHORECTOMY (Left ) Diagnosis: (OVARY TORSION)    Surgeons: Babak Brock MD Responsible Provider: Janiya Wasserman DO    Anesthesia Type: general ASA Status: 1          Anesthesia Type: general    Elizabeth Phase I: Elizabeth Score: 10    Elizabeth Phase II:      Last vitals: Reviewed and per EMR flowsheets. Anesthesia Post Evaluation    Comments: Ciara Williamson 62  POST-ANESTHESIA NOTE       Name:  Marcial Chow                                         Age:  23 y.o.   MRN:  [id number]      Last Vitals:  /81   Pulse 85   Temp 98.3 °F (36.8 °C) (Oral)   Resp 16   Ht 5' 7.5\" (1.715 m)   Wt 216 lb (98 kg)   LMP 03/31/2021   SpO2 96%   BMI 33.33 kg/m²   Patient Vitals in the past 4 hrs:  04/06/21 1328, BP:113/81, Pulse:85, Resp:16, SpO2:96 %  04/06/21 1225, BP:115/77, Temp:98.3 °F (36.8 °C), Temp src:Oral, Pulse:76, Resp:18, SpO2:98 %  04/06/21 1215, BP:108/66, Pulse:70, Resp:13, SpO2:99 %  04/06/21 1210, BP:109/65, Pulse:69, Resp:13, SpO2:99 %  04/06/21 1205, BP:112/64, Pulse:66, Resp:14, SpO2:96 %  04/06/21 1200, BP:104/62, Pulse:71, Resp:21, SpO2:99 %  04/06/21 1155, BP:(!) 103/58, Pulse:64, Resp:14, SpO2:99 %  04/06/21 1150, BP:(!) 103/58, Pulse:81, Resp:16, SpO2:98 %  04/06/21 1145, BP:(!) 102/57, Pulse:64, Resp:14, SpO2:98 %  04/06/21 1140, BP:(!) 97/56, Pulse:67, Resp:14, SpO2:97 %  04/06/21 1135, BP:(!) 97/55, Pulse:79, Resp:16, SpO2:99 %  04/06/21 1130, BP:(!) 96/52, Pulse:79, Resp:14, SpO2:97 %  04/06/21 1126, BP:(!) 97/53, Temp:97.2 °F (36.2 °C), Temp src:Temporal, Pulse:90, Resp:24, SpO2:97 %    Level of Consciousness:  Awake    Respiratory:  Stable    Oxygen Saturation: Stable    Cardiovascular:  Stable    Hydration:  Adequate    PONV:  Stable    Post-op Pain:  Adequate analgesia    Post-op Assessment:  No apparent anesthetic complications    Additional Follow-Up / Treatment / Comment:  None    Saintclair Charnley.  DO Dawit  April 6, 2021   1:49 PM

## 2021-04-06 NOTE — ED NOTES
Dr. Lata Mcclellan to bedside to evaluate patient, mother at bedside with patient, patient is having pain currently     Robert Hernandez RN  04/05/21 3362

## 2021-04-06 NOTE — ED NOTES
Patient medicated per order, mother at bedside, denies needs at this time     Yvrose Elliott, RN  04/06/21 3794

## 2021-04-06 NOTE — OP NOTE
Brief Operative Report      Pre-operative Diagnosis:  Left recurrent ovarian torsion, with torsion x 3 and necrotic tube    Post-operative Diagnosis:  Same    Procedure:  Operative L/S with LSO    Surgeon: Oni Ayon    Assistant Surgeons: Lesley Hernandez MD     Anesthesia:  General endotrachial anesthesia    Estimated blood loss: 15 cc's     Findings: See Operative Dictation    Complications:  none      See dictated operative report for full details.       Nicole Baig MD

## 2021-04-06 NOTE — ED NOTES
Patient updated on plan of care, denies needs, family at bedside     Tessa Means [facility]  04/06/21 010

## 2021-04-06 NOTE — PROGRESS NOTES
Patient arrived to 6E by bed from ED at 46. Patient tearful and in severe pain. Dr. Suzie Weber arrived to evaluate patient. COVID swab sent. Patient voided and washed abdomen and you area with CHG soap. Patient tikelly William Pagan accompanies her.

## 2021-04-07 ENCOUNTER — CARE COORDINATION (OUTPATIENT)
Dept: CASE MANAGEMENT | Age: 26
End: 2021-04-07

## 2021-04-07 NOTE — CARE COORDINATION
[facility] Transitions Initial Follow Up Call    Call within 2 business days of discharge: Yes    Patient: Anton Chandler Patient : 1995   MRN: [id number]  Reason for Admission:  Torsion of left Ovary  Discharge Date: 21 RARS: No data recorded    Last Discharge [facility]       Complaint Diagnosis Description Type Department Provider    23 Abdominal Pain Torsion of left ovary . .. ED to Hosp-Admission (Discharged) (ADMITTED) Vignesh Quick MD; Tyler Abreu MD          Was this an external facility discharge? No Discharge Facility:  n/a    Challenges to be reviewed by the provider   Additional needs identified to be addressed with provider No  none             Method of communication with provider : none    Discussed COVID-19 related testing which was available at this time. Test results were negative. Patient informed of results, if available? Yes    Advance Care Planning:   Does patient have an Advance Directive:  reviewed and current. Was this a readmission? No  Patient stated reason for admission:  Abdominal Pain  Patients top risk factors for readmission: no known risk factos    Care Transition Nurse (CTN) contacted the patient by telephone to perform post hospital discharge assessment. Verified name and  with patient as identifiers. Provided introduction to self, and explanation of the CTN role. CTN reviewed discharge instructions, medical action plan and red flags with patient who verbalized understanding. Patient given an opportunity to ask questions and does not have any further questions or concerns at this time. Were discharge instructions available to patient? Yes. Reviewed appropriate site of care based on symptoms and resources available to patient including: Specialist. The patient agrees to contact the PCP office for questions related to their healthcare.      Medication reconciliation was performed with patient, who verbalizes understanding of administration of home medications. Advised obtaining a 90-day supply of all daily and as-needed medications. Covid Risk Education    Patient has following risk factors of: no known risk factors. Education provided regarding infection prevention, and signs and symptoms of COVID-19 and when to seek medical attention with patient who verbalized understanding. Discussed exposure protocols and quarantine From CDC: Are you at higher risk for severe illness?   and given an opportunity for questions and concerns. The patient agrees to contact the COVID-19 hotline [phone] or PCP office for questions related to COVID-19. For more information on steps you can take to protect yourself, see CDC's How to Protect Yourself     Was patient discharged with a pulse oximeter? No Discussed and confirmed pulse oximeter discharge instructions and when to notify provider or seek emergency care. Patient/family/caregiver given information for [facility] and agrees to enroll no  Patient's preferred e-mail: declines  Patient's preferred phone number: declines    Discussed follow-up appointments. If no appointment was previously scheduled, appointment scheduling offered: Yes. Is follow up appointment scheduled within 7 days of discharge? Yes  Non-[facility] follow up appointment(s):    OB/GYN appt 4/13/2021    Plan for follow-up call in 7-10 days based on severity of symptoms and risk factors. Plan for next call: symptom management- OB/GYN and follow up appointment- 4/13/2021  CTN provided contact information for future needs. Non-face-to-face services provided:  Scheduled appointment with Specialist- OB/GYN  Obtained and reviewed discharge summary and/or continuity of care documents    Care Transitions 24 Hour Call    Care Transitions Interventions       Care Transitions documentationreviewd, pt states she took her steri strips off 3 abd sites, no s/s of infection reviewed with paitnet.   She is taking medication for pain as needed, reviewed constipation with narcotics pt verbalized understanding. Pt has f/u appt 4/13/2021    Follow Up  No future appointments.     Magnus Villalobos RN

## 2021-04-07 NOTE — OP NOTE
[address] [place] [address]                                OPERATIVE REPORT    PATIENT NAME: Emiliano Azar                     :        1995  MED REC NO:   [id number]                           ROOM:       9358  ACCOUNT NO:   [de-identified]                           ADMIT DATE: 2021  PROVIDER:     Solange Mckinley M.D.    Isi :  2021    PREOPERATIVE DIAGNOSIS:  Recurrent left ovarian torsion. POSTOPERATIVE DIAGNOSES:  Recurrent left ovarian torsion with necrotic  tube on the left with torsion of the adnexa x3. OPERATION PERFORMED:  An operative laparoscopy with left  salpingo-oophorectomy. SURGEON:  Subhash Mckinley MD    ASSISTANT:  Elin Paris, second year, family practice resident. ESTIMATED BLOOD LOSS:  15 mL. PATHOLOGY SPECIMEN:  Left adnexa. DESCRIPTION OF PROCEDURE:  The patient was taken to the operating room  at which time she underwent general anesthetic. She was placed in the  dorsal lithotomy position, sterilely prepped and draped in the usual  manner. A weighted speculum was placed in the vagina. The anterior lip  of the cervix was grasped with a Hulka tenaculum. Scalpel was used to  make an infraumbilical stab incision over the old scar. This was  elevated and a Veress needle was introduced. Proper positioning was  confirmed by the drop test.  Once approximately 4.5 liters of CO2 was  instilled, the Veress needle was removed and a trocar placed. The  remaining laparoscopic ports were then placed, 5 mm in the left lower  quadrant over the old scar and a 10 mm suprapubically. The adnexa was  inspected and was found to have torsion x3 with necrotic tube. The  right adnexa was noted to be normal.  The plan had been to remove the  left adnexa per the patient and her 's request, but secondary to  the necrosis, this was deemed appropriate.   The ureter was identified through the peritoneum well out of the operative field. The LigaSure  was then placed into the field. The pedicle that was torsed was then  coagulated and transected removing the adnexa. A large bag was then placed through the suprapubic port and the ovary  was then placed into the bag. The bag was then brought up to the skin  and bivalve and dissection of the ovary, removed it intact in the bag. #1 Vicryl suture was then placed through the fascia of the suprapubic  port for closure. The pelvis was inspected and found to be hemostatic. The remaining laparoscopic ports were removed. The gas was allowed to  escape. Camera and ports were removed together. The incision was then  closed with buried interrupted 4-0 Vicryl, had Marcaine injected into  each as well as Steri-Strips and Band-Aids applied. The tenaculum was  removed. The site was hemostatic. The patient was then undraped,  cleaned of all Betadine, taken out of lithotomy position, transported to  the cart, and taken to the recovery room in stable condition.         Bill Feliciano M.D.    D: 04/06/2021 11:32:55       T: 04/06/2021 14:08:05     [email or web address]  Job#: [id number]     Doc#: [id number]    CC:

## 2021-04-15 ENCOUNTER — CARE COORDINATION (OUTPATIENT)
Dept: CASE MANAGEMENT | Age: 26
End: 2021-04-15

## 2021-04-15 NOTE — CARE COORDINATION
[facility] Trans[facility] Follow Up Call    4/15/2021    Patient: Kyle Richard  Patient : 1995   MRN: [id number]  Reason for Admission:  Torsion of left ovary  Discharge Date: 21 RARS: No data recorded       Spoke with: Gloria Han    She reports doing well, denies any question or  Problems at this time. Follow up appt was completed. Care Transitions Subsequent and Final Call    Subsequent and Final Calls  Care Transitions Interventions  Other Interventions: Follow Up  No future appointments.     Dima Hunt RN

## 2021-04-29 ENCOUNTER — CARE COORDINATION (OUTPATIENT)
Dept: CASE MANAGEMENT | Age: 26
End: 2021-04-29

## 2021-04-29 NOTE — CARE COORDINATION
[facility] Trans[facility] Follow Up Call    2021    Patient: Uche Viramontes  Patient : 1995   MRN: <[id number]>  Reason for Admission: Torsion of left ovary  Discharge Date: 21 RARS: No data recorded       Spoke with: Called to speak with patient for update with transition of care. Left HIPPA compliant voice message with contact information [phone] for a call  Back with an update. Care Transitions Subsequent and Final Call    Subsequent and Final Calls  Care Transitions Interventions  Other Interventions: Follow Up  No future appointments.     Ulisses Medeiros LPN

## 2021-05-06 ENCOUNTER — CARE COORDINATION (OUTPATIENT)
Dept: CASE MANAGEMENT | Age: 26
End: 2021-05-06

## 2021-05-06 NOTE — CARE COORDINATION
[facility] Transitions Follow Up Call    2021    Patient: Adriana Hardy  Patient : 1995   MRN: <[id number]>  Reason for Admission: Torsion of left ovary  Discharge Date: 21 RARS: No data recorded       Spoke with: Called to speak with patient for update with transition of care. Left HIPPA compliant voice message with contact information [phone] for a call  Back with an update. Care Transitions Subsequent and Final Call    Subsequent and Final Calls  Care Transitions Interventions  Other Interventions: Follow Up  No future appointments.     Jc Lind LPN

## 2021-06-19 ENCOUNTER — HOSPITAL ENCOUNTER (EMERGENCY)
Age: 26
Discharge: HOME | End: 2021-06-19
Attending: EMERGENCY MEDICINE
Payer: COMMERCIAL

## 2021-06-19 VITALS
SYSTOLIC BLOOD PRESSURE: 129 MMHG | HEART RATE: 78 BPM | TEMPERATURE: 97.3 F | BODY MASS INDEX: 30.55 KG/M2 | OXYGEN SATURATION: 99 % | WEIGHT: 198 LBS | RESPIRATION RATE: 16 BRPM | DIASTOLIC BLOOD PRESSURE: 82 MMHG

## 2021-06-19 DIAGNOSIS — J04.0 ACUTE LARYNGITIS: Primary | ICD-10-CM

## 2021-06-19 DIAGNOSIS — J06.9 ACUTE UPPER RESPIRATORY INFECTION: ICD-10-CM

## 2021-06-19 LAB
GROUP A STREP CULTURE, REFLEX: NEGATIVE
REFLEX THROAT C + S: NORMAL

## 2021-06-19 PROCEDURE — 87070 CULTURE OTHR SPECIMN AEROBIC: CPT

## 2021-06-19 PROCEDURE — 99213 OFFICE O/P EST LOW 20 MIN: CPT

## 2021-06-19 PROCEDURE — 87880 STREP A ASSAY W/OPTIC: CPT

## 2021-06-19 PROCEDURE — 99213 OFFICE O/P EST LOW 20 MIN: CPT | Performed by: EMERGENCY MEDICINE

## 2021-06-19 RX ORDER — CETIRIZINE HYDROCHLORIDE, PSEUDOEPHEDRINE HYDROCHLORIDE 5; 120 MG/1; MG/1
1 TABLET, FILM COATED, EXTENDED RELEASE ORAL 2 TIMES DAILY
Qty: 30 TABLET | Refills: 0 | Status: SHIPPED | OUTPATIENT
Start: 2021-06-19 | End: 2021-07-04

## 2021-06-19 RX ORDER — AZITHROMYCIN 250 MG/1
TABLET, FILM COATED ORAL
Qty: 1 PACKET | Refills: 0 | Status: SHIPPED | OUTPATIENT
Start: 2021-06-19 | End: 2021-06-23

## 2021-06-19 RX ORDER — DEXTROMETHORPHAN HYDROBROMIDE AND PROMETHAZINE HYDROCHLORIDE 15; 6.25 MG/5ML; MG/5ML
5 SYRUP ORAL 4 TIMES DAILY PRN
Qty: 120 ML | Refills: 0 | Status: SHIPPED | OUTPATIENT
Start: 2021-06-19 | End: 2021-06-23

## 2021-06-19 RX ORDER — CHLORPHENIRAMINE MALEATE, DEXTROMETHORPHAN HBR 4; 30 MG/1; MG/1
TABLET, SUGAR COATED ORAL
COMMUNITY
End: 2022-07-07 | Stop reason: ALTCHOICE

## 2021-06-19 ASSESSMENT — ENCOUNTER SYMPTOMS
CHOKING: 0
CONSTIPATION: 0
SHORTNESS OF BREATH: 0
TROUBLE SWALLOWING: 0
DIARRHEA: 0
WHEEZING: 0
EYE REDNESS: 0
EYE PAIN: 0
ABDOMINAL PAIN: 0
BACK PAIN: 0
VOICE CHANGE: 1
SORE THROAT: 1
SINUS PRESSURE: 0
NAUSEA: 0
BLOOD IN STOOL: 0
COUGH: 1
FACIAL SWELLING: 0
STRIDOR: 0
EYE DISCHARGE: 0
VOMITING: 0

## 2021-06-19 ASSESSMENT — PAIN DESCRIPTION - PROGRESSION: CLINICAL_PROGRESSION: NOT CHANGED

## 2021-06-19 ASSESSMENT — PAIN DESCRIPTION - DESCRIPTORS: DESCRIPTORS: ACHING

## 2021-06-19 ASSESSMENT — PAIN SCALES - GENERAL: PAINLEVEL_OUTOF10: 2

## 2021-06-19 ASSESSMENT — PAIN DESCRIPTION - LOCATION: LOCATION: THROAT

## 2021-06-19 ASSESSMENT — PAIN DESCRIPTION - FREQUENCY: FREQUENCY: CONTINUOUS

## 2021-06-19 ASSESSMENT — PAIN - FUNCTIONAL ASSESSMENT: PAIN_FUNCTIONAL_ASSESSMENT: ACTIVITIES ARE NOT PREVENTED

## 2021-06-19 ASSESSMENT — PAIN DESCRIPTION - PAIN TYPE: TYPE: ACUTE PAIN

## 2021-06-19 ASSESSMENT — PAIN DESCRIPTION - ONSET: ONSET: PROGRESSIVE

## 2021-06-19 NOTE — ED PROVIDER NOTES
[phone]       Chief Complaint   Patient presents with    Pharyngitis     hoarse voice    Nasal Congestion       Nurses Notes reviewed and I agree except as noted in the HPI. HISTORY OF PRESENT ILLNESS   Jerrod Martinez is a 23 y.o. female who presents with 5-day history of sore throat that she rates at 4-10 severity, with dry cough, congestion, postnasal drainage, loss of voice. Father with same symptoms. No chest pain, shortness of breath, fever, vomiting. No COVID-19 exposure. Has tonsils, no history of diabetes or asthma. Non-smoker. REVIEW OF SYSTEMS     Review of Systems   Constitutional: Positive for appetite change. Negative for chills, fatigue, fever and unexpected weight change. HENT: Positive for congestion, postnasal drip, sore throat and voice change. Negative for ear discharge, ear pain, facial swelling, hearing loss, nosebleeds, sinus pressure and trouble swallowing. Eyes: Negative for pain, discharge, redness and visual disturbance. Respiratory: Positive for cough. Negative for choking, shortness of breath, wheezing and stridor. Cardiovascular: Negative for chest pain and leg swelling. Gastrointestinal: Negative for abdominal pain, blood in stool, constipation, diarrhea, nausea and vomiting. Genitourinary: Negative for dysuria, flank pain, frequency, hematuria, urgency, vaginal bleeding and vaginal discharge. Musculoskeletal: Negative for arthralgias, back pain, neck pain and neck stiffness. Skin: Negative for rash. Neurological: Negative for dizziness, seizures, syncope, weakness, light-headedness and headaches. Hematological: Negative for adenopathy. Does not bruise/bleed easily. Psychiatric/Behavioral: Negative for confusion, sleep disturbance and suicidal ideas. The patient is not nervous/anxious. All other systems reviewed and are negative.       PAST MEDICAL HISTORY         Diagnosis Date membranes, no stridor   HENT:      Head: Normocephalic and atraumatic. Right Ear: Tympanic membrane and external ear normal.      Left Ear: Tympanic membrane and external ear normal.      Nose: Congestion and rhinorrhea present. Right Sinus: No maxillary sinus tenderness or frontal sinus tenderness. Left Sinus: No maxillary sinus tenderness or frontal sinus tenderness. Mouth/Throat:      Pharynx: No oropharyngeal exudate. Comments: Pharyngeal erythema no exudate or abscess  Eyes:      General: No scleral icterus. Right eye: No discharge. Left eye: No discharge. Extraocular Movements:      Right eye: Normal extraocular motion. Left eye: Normal extraocular motion. Conjunctiva/sclera: Conjunctivae normal.      Pupils: Pupils are equal, round, and reactive to light. Comments: Conjunctiva clear   Neck:      Thyroid: No thyromegaly. Vascular: No JVD. Comments: No meningismus  Cardiovascular:      Rate and Rhythm: Normal rate and regular rhythm. Pulses: Normal pulses. Heart sounds: Normal heart sounds, S1 normal and S2 normal. No murmur heard. No friction rub. No gallop. Pulmonary:      Effort: Pulmonary effort is normal. No tachypnea or respiratory distress. Breath sounds: Normal breath sounds. No stridor. No decreased breath sounds, wheezing, rhonchi or rales. Comments: Dry cough, lungs clear  Chest:      Chest wall: No tenderness. Abdominal:      General: Bowel sounds are normal. There is no distension. Palpations: Abdomen is soft. There is no mass. Tenderness: There is no abdominal tenderness. There is no right CVA tenderness, left CVA tenderness, guarding or rebound. Comments: Soft nontender   Musculoskeletal:         General: No tenderness. Normal range of motion. Cervical back: Normal range of motion. Comments: Joints normal   Lymphadenopathy:      Cervical: Cervical adenopathy present. Right cervical: Superficial cervical adenopathy present. No deep cervical adenopathy. Left cervical: Superficial cervical adenopathy present. No deep cervical adenopathy. Skin:     General: Skin is warm and dry. Findings: No erythema or rash. Comments: No rash or bruising   Neurological:      Mental Status: She is alert and oriented to person, place, and time. Cranial Nerves: No cranial nerve deficit. Motor: No abnormal muscle tone. Coordination: Coordination normal.      Deep Tendon Reflexes: Reflexes are normal and symmetric. Reflexes normal.      Comments: Appropriate, no focal finding   Psychiatric:         Behavior: Behavior normal.         Thought Content: Thought content normal.         Judgment: Judgment normal.         DIAGNOSTIC RESULTS   Labs:   Results for orders placed or performed during the hospital encounter of 06/19/21   Strep A culture, throat   Result Value Ref Range    REFLEX THROAT C + S INDICATED    STREP A ANTIGEN   Result Value Ref Range    GROUP A STREP CULTURE, REFLEX Negative        IMAGING:  No orders to display     URGENT CARE COURSE:     Vitals:    06/19/21 1045   BP: 129/82   Pulse: 78   Resp: 16   Temp: 97.3 °F (36.3 °C)   TempSrc: Temporal   SpO2: 99%   Weight: 198 lb (89.8 kg)       Medications - No data to display  PROCEDURES:  None  FINALIMPRESSION      1. Acute laryngitis    2. Acute upper respiratory infection        DISPOSITION/PLAN   DISPOSITION    Nontoxic, well-hydrated, normal airway. No airway abscess or epiglottitis, sepsis, CNS infection, pneumonia, hypoxia, bronchospasm. Rapid strep negative. Patient has acute laryngitis. Will treat with Zithromax, Zyrtec-D, Phenergan DM, Tylenol, increased oral clear liquids, vaporizer, rest in cool air conditioned space, including voice rest.  Patient to recheck with PCP in 6 days if problems persist, and she understands to go to ED if worse.   PATIENT REFERRED TO:  Jack Gibson MD  [address] [facility] [address] [place] [id number]  [phone]    Schedule an appointment as soon as possible for a visit in 6 days  Recheck in office in problems persist, go to emergency room if worse    DISCHARGE MEDICATIONS:  New Prescriptions    AZITHROMYCIN (ZITHROMAX Z-VESTA) 250 MG TABLET    Take 2 tablets (500 mg) on Day 1, and then take 1 tablet (250 mg) on days 2 through 5.     CETIRIZINE-PSUEDOEPHEDRINE (ZYRTEC-D) 5-120 MG PER EXTENDED RELEASE TABLET    Take 1 tablet by mouth 2 times daily for 30 doses 1 p.o. twice daily for sinus congestion, postnasal drainage, sinus pain, ear pain and pressure, cough    PROMETHAZINE-DEXTROMETHORPHAN (PROMETHAZINE-DM) 6.25-15 MG/5ML SYRUP    Take 5 mLs by mouth 4 times daily as needed for Cough Caution will cause drowsiness     Current Discharge Medication List          MD Cirilo Comer MD  06/19/21 8236

## 2021-06-19 NOTE — ED TRIAGE NOTES
Pt to room 5 with her boyfriend and c/o a sore throat with a lot of congestion and nasal drainage x 5 days. She is getting  tomorrow and if hoping to get some relief.

## 2021-06-21 LAB — THROAT/NOSE CULTURE: NORMAL

## 2021-06-22 ENCOUNTER — TELEPHONE (OUTPATIENT)
Dept: FAMILY MEDICINE CLINIC | Age: 26
End: 2021-06-22

## 2021-06-22 NOTE — LETTER
[address] [facility]  [address] [facility], [address]  Phone: [phone]  Fax: [phone]    June 22, 2021    [address] [address]    Dear Mariana Aviles,    Thank you for choosing our [facility] on 6/19/21. Dr. Jack Gibson wanted to make sure that you understand your discharge instructions and that you were able to fill any prescriptions that may have been ordered for you. Please contact the office at the above phone number if you were advised to follow up with Dr. Jack Gibson, or if you have any further questions or needs. Also, did you know -                             [facility] ([facility]) practices can often offer you an appointment on the same day that you call for acute issues. *We have some [facility] offices that offer Walk-in appointments; check our website for availability in your community, [email or web address].      *Evisits are now available for patients through [address] [facility]) also offers video visits through [address] If you do not have MyChart and are interested, please contact the office and a staff member may assist you or go to [email or web address].     Sincerely,   Jack Gibson MD and your [facility] [place]

## 2022-03-04 ENCOUNTER — NURSE ONLY (OUTPATIENT)
Dept: LAB | Age: 27
End: 2022-03-04

## 2022-03-10 LAB
APTIMA MEDIA TYPE: NORMAL
CHLAMYDIA TRACHOMATIS AMPLIFIED DET: NEGATIVE
NEISSERIA GONORRHOEAE BY AMP: NEGATIVE
SPECIMEN SOURCE: NORMAL
T. VAGINALIS SPECIMEN SOURCE: NORMAL
TRICHOMONAS VAGINALIS BY NAA: NEGATIVE

## 2022-04-01 ENCOUNTER — HOSPITAL ENCOUNTER (OUTPATIENT)
Age: 27
Discharge: HOME | End: 2022-04-01

## 2022-05-04 ENCOUNTER — NURSE ONLY (OUTPATIENT)
Dept: LAB | Age: 27
End: 2022-05-04

## 2022-05-04 LAB — HEPATITIS C ANTIBODY: NEGATIVE

## 2022-07-03 NOTE — PROGRESS NOTES
[address] [address] [address] [facility]  Dept: [phone]  Dept Fax: [phone]  DERRICK Leslie is a 27 y. o.female    Pt presents for annual wellness physical exam.      Patient also presents for follow-up of acne vulgaris and obesity. Pt currently 28 weeks pregnant- seeing Dr. Alisha Villeda. Glucometer readings at home are not needed. The home BP readings have not been checked regularly. Pt doing well at this time- denies any new complaints. Wt Readings from Last 3 Encounters:   07/07/22 236 lb (107 kg)   06/19/21 198 lb (89.8 kg)   04/06/21 216 lb (98 kg)     Pt stable since last visit- no new problems for diagnoses listed below:  Patient Active Problem List   Diagnosis    Overweight (BMI 25.0-29. 9)    Left lower quadrant pain    Left ovarian cyst    Ovarian torsion    S/P laparoscopy    S/P exploratory laparotomy    Torsion of left ovary     Review of Systems   Constitutional: Negative for chills, diaphoresis, fatigue, fever and unexpected weight change. Eyes: Negative for visual disturbance. Respiratory: Negative for chest tightness and shortness of breath. Cardiovascular: Positive for leg swelling (intermittent- with pregnancy). Negative for chest pain and palpitations. Gastrointestinal: Negative for abdominal pain, anal bleeding, blood in stool, constipation, diarrhea, nausea and vomiting. Genitourinary: Negative for dysuria and hematuria. Musculoskeletal: Negative for neck pain. Neurological: Negative for dizziness, light-headedness and headaches.      OBJECTIVE     /76 (Site: Right Upper Arm, Position: Sitting, Cuff Size: Medium Adult)   Pulse 91   Temp 98.1 °F (36.7 °C) (Skin)   Resp 18   Ht 5' 9\" (1.753 m)   Wt 236 lb (107 kg)   SpO2 98%   Breastfeeding No   BMI 34.85 kg/m²     Wt Readings from Last 3 Encounters:   07/07/22 236 lb (107 kg)   06/19/21 198 lb (89.8 kg)   04/06/21 216 lb (98 kg)     Physical Exam  Vitals and nursing note reviewed. Constitutional:       Appearance: She is well-developed. HENT:      Head: Normocephalic and atraumatic. Right Ear: External ear normal.      Left Ear: External ear normal.      Nose: Nose normal.   Eyes:      Conjunctiva/sclera: Conjunctivae normal.      Pupils: Pupils are equal, round, and reactive to light. Cardiovascular:      Rate and Rhythm: Normal rate and regular rhythm. Heart sounds: Normal heart sounds. Pulmonary:      Effort: Pulmonary effort is normal.      Breath sounds: Normal breath sounds. Abdominal:      General: Bowel sounds are normal.      Palpations: Abdomen is soft. Musculoskeletal:         General: Normal range of motion. Cervical back: Normal range of motion and neck supple. Skin:     General: Skin is warm and dry. Neurological:      Mental Status: She is alert and oriented to person, place, and time. Deep Tendon Reflexes: Reflexes are normal and symmetric. Psychiatric:         Behavior: Behavior normal.         Thought Content: Thought content normal.         Judgment: Judgment normal.       No results found for: LABA1C  No results found for: EAG    No results found for: CHOL, TRIG, HDL, LDLCALC, LDLDIRECT    The ASCVD Risk score (Ishmael Johnson., et al., 2013) failed to calculate for the following reasons: The 2013 ASCVD risk score is only valid for ages 36 to 78    Lab Results   Component Value Date     04/05/2021    K 4.1 04/05/2021     04/05/2021    CO2 24 04/05/2021    BUN 9 04/05/2021    CREATININE 0.8 04/05/2021    GLUCOSE 79 04/05/2021    CALCIUM 9.1 04/05/2021    PROT 7.5 04/05/2021    LABALBU 4.3 04/05/2021    BILITOT 0.3 04/05/2021    ALKPHOS 66 04/05/2021    AST 18 04/05/2021    ALT 21 04/05/2021    LABGLOM 88 (A) 04/05/2021     CrCl cannot be calculated (Patient's most recent lab result is older than the maximum 180 days allowed. ).       No results found for: TSH, W2RLCGZ, F7KVBLF, THYROIDAB, FT3, T4FREE    Lab Results   Component Value Date    WBC 8.2 03/29/2022    HGB 13.7 03/29/2022    HCT 40.8 03/29/2022    MCV 87.4 03/29/2022     03/29/2022         Immunization History   Administered Date(s) Administered    DTP 10/19/2009    DTaP 1997, 02/05/1998, 04/21/1998, 04/16/1999, 04/09/2003    DTaP vaccine 02/05/1998, 04/21/1998, 04/09/2003    HPV (Human Papilloma Virus)Vaccine 03/29/2011, 05/31/2011    HPV Quadrivalent (Gardasil) 03/29/2011, 05/31/2011, 11/21/2011    Hepatitis B 1995, 1995, 04/21/1998    Hepatitis B Ped/Adol (Engerix-B, Recombivax HB) 04/21/1998    Hib, unspecified 1997, 02/05/1998, 04/21/1998, 04/16/1999    Influenza 01/10/2013, 11/27/2013    Influenza A (N2S4-76) Vaccine IM 11/02/2009    Influenza Nasal 11/21/2011    Influenza Virus Vaccine 12/04/2014, 11/17/2015, 10/25/2018    Influenza, Nilesh Martines, IM, (6 mo and older Fluzone, Flulaval, Fluarix and 3 yrs and older Afluria) 10/20/2016, 10/09/2017    Influenza, Nilesh Martines, IM, PF (6 mo and older Fluzone, Flulaval, Fluarix, and 3 yrs and older Afluria) 03/31/2022    MMR 04/16/1999, 04/09/2003    Meningococcal ACWY Vaccine 10/19/2009    Meningococcal MCV4P (Menactra) 10/19/2009, 03/27/2017    PPD Test 03/27/2017, 04/03/2017    Polio IPV (IPOL) 1997, 01/29/1998, 04/16/1999, 04/09/2003    Tdap (Boostrix, Adacel) 10/19/2009, 04/01/2022       Health Maintenance   Topic Date Due    Depression Screen  Never done    Varicella vaccine (1 of 2 - 2-dose childhood series) Never done    Pap smear  Never done    COVID-19 Vaccine (1) 07/07/2023 (Originally 9/8/2002)    Flu vaccine (1) 09/01/2022    Chlamydia screen  03/04/2023    DTaP/Tdap/Td vaccine (8 - Td or Tdap) 04/01/2032    Hepatitis B vaccine  Completed    Hib vaccine  Completed    HPV vaccine  Completed    Hepatitis C screen  Completed    HIV screen  Completed    Hepatitis A vaccine  Aged C/ [facility] 19 Meningococcal (ACWY) vaccine  Aged Out    Pneumococcal 0-64 years Vaccine  Aged Out     Sleep Medicine referral indicated at this time (Obesity, Snoring, Daytime Somnolence, Apneic Episodes)? Pt denies any current symptoms. No future appointments. ASSESSMENT       Diagnosis Orders   1. Well adult exam     2. Class 1 obesity without serious comorbidity in adult, unspecified BMI, unspecified obesity type       PLAN      Continue to work on diet, exercise, and weight loss for optimal cardiovascular health after delivery. Will hold lab tests at this time due to current pregnancy- will consider again next year. Follow-up in 12 months. Preventive Health Topics:  Encouraged COVID VACCINE- pt declines. Encouraged FLU VACCINE after October 1st annually. PAP/ PELVIC management per Dr. Gordy Chamorro- to follow up 7/11/2022 as pt currently 28 weeks pregnant.            Electronically signed Jeane Jimenez MD on 7/7/2022 at 9:58 AM

## 2022-07-07 ENCOUNTER — OFFICE VISIT (OUTPATIENT)
Dept: FAMILY MEDICINE CLINIC | Age: 27
End: 2022-07-07
Payer: COMMERCIAL

## 2022-07-07 VITALS
OXYGEN SATURATION: 98 % | WEIGHT: 236 LBS | DIASTOLIC BLOOD PRESSURE: 76 MMHG | HEART RATE: 91 BPM | BODY MASS INDEX: 34.96 KG/M2 | TEMPERATURE: 98.1 F | SYSTOLIC BLOOD PRESSURE: 118 MMHG | RESPIRATION RATE: 18 BRPM | HEIGHT: 69 IN

## 2022-07-07 DIAGNOSIS — Z00.00 WELL ADULT EXAM: Primary | ICD-10-CM

## 2022-07-07 DIAGNOSIS — E66.9 CLASS 1 OBESITY WITHOUT SERIOUS COMORBIDITY IN ADULT, UNSPECIFIED BMI, UNSPECIFIED OBESITY TYPE: ICD-10-CM

## 2022-07-07 PROCEDURE — 99395 PREV VISIT EST AGE 18-39: CPT | Performed by: FAMILY MEDICINE

## 2022-07-07 SDOH — ECONOMIC STABILITY: FOOD INSECURITY: WITHIN THE PAST 12 MONTHS, THE FOOD YOU BOUGHT JUST DIDN'T LAST AND YOU DIDN'T HAVE MONEY TO GET MORE.: NEVER TRUE

## 2022-07-07 SDOH — ECONOMIC STABILITY: FOOD INSECURITY: WITHIN THE PAST 12 MONTHS, YOU WORRIED THAT YOUR FOOD WOULD RUN OUT BEFORE YOU GOT MONEY TO BUY MORE.: NEVER TRUE

## 2022-07-07 ASSESSMENT — ENCOUNTER SYMPTOMS
ABDOMINAL PAIN: 0
DIARRHEA: 0
ANAL BLEEDING: 0
CHEST TIGHTNESS: 0
VOMITING: 0
SHORTNESS OF BREATH: 0
BLOOD IN STOOL: 0
CONSTIPATION: 0
NAUSEA: 0

## 2022-07-07 ASSESSMENT — PATIENT HEALTH QUESTIONNAIRE - PHQ9
SUM OF ALL RESPONSES TO PHQ QUESTIONS 1-9: 0
SUM OF ALL RESPONSES TO PHQ9 QUESTIONS 1 & 2: 0
SUM OF ALL RESPONSES TO PHQ QUESTIONS 1-9: 0
2. FEELING DOWN, DEPRESSED OR HOPELESS: 0
1. LITTLE INTEREST OR PLEASURE IN DOING THINGS: 0
SUM OF ALL RESPONSES TO PHQ QUESTIONS 1-9: 0
SUM OF ALL RESPONSES TO PHQ QUESTIONS 1-9: 0

## 2022-07-07 ASSESSMENT — SOCIAL DETERMINANTS OF HEALTH (SDOH): HOW HARD IS IT FOR YOU TO PAY FOR THE VERY BASICS LIKE FOOD, HOUSING, MEDICAL CARE, AND HEATING?: NOT HARD AT ALL

## 2022-07-07 NOTE — PATIENT INSTRUCTIONS
Continue to work on diet, exercise, and weight loss for optimal cardiovascular health after delivery. Will hold lab tests at this time due to current pregnancy- will consider again next year. Follow-up in 12 months. Preventive Health Topics:  Encouraged COVID VACCINE- pt declines. Encouraged FLU VACCINE after October 1st annually. PAP/ PELVIC management per Dr. Terry Conde- to follow up 7/11/2022 as pt currently 28 weeks pregnant.

## 2022-09-10 ENCOUNTER — HOSPITAL ENCOUNTER (OUTPATIENT)
Age: 27
Discharge: HOME | End: 2022-09-10
Attending: OBSTETRICS & GYNECOLOGY | Admitting: OBSTETRICS & GYNECOLOGY
Payer: COMMERCIAL

## 2022-09-10 VITALS
WEIGHT: 260 LBS | OXYGEN SATURATION: 97 % | DIASTOLIC BLOOD PRESSURE: 76 MMHG | SYSTOLIC BLOOD PRESSURE: 128 MMHG | BODY MASS INDEX: 38.51 KG/M2 | RESPIRATION RATE: 16 BRPM | HEIGHT: 69 IN | TEMPERATURE: 96.3 F | HEART RATE: 88 BPM

## 2022-09-10 PROBLEM — O16.3 HIGH BLOOD PRESSURE AFFECTING PREGNANCY IN THIRD TRIMESTER, ANTEPARTUM: Status: ACTIVE | Noted: 2022-09-10

## 2022-09-10 LAB
ALBUMIN SERPL-MCNC: 3.3 G/DL (ref 3.5–5.1)
ALP BLD-CCNC: 136 U/L (ref 38–126)
ALT SERPL-CCNC: 12 U/L (ref 11–66)
ANION GAP SERPL CALCULATED.3IONS-SCNC: 12 MEQ/L (ref 8–16)
AST SERPL-CCNC: 20 U/L (ref 5–40)
BASOPHILS # BLD: 0.3 %
BASOPHILS ABSOLUTE: 0 THOU/MM3 (ref 0–0.1)
BILIRUB SERPL-MCNC: 0.3 MG/DL (ref 0.3–1.2)
BUN BLDV-MCNC: 12 MG/DL (ref 7–22)
CALCIUM SERPL-MCNC: 8.8 MG/DL (ref 8.5–10.5)
CHLORIDE BLD-SCNC: 105 MEQ/L (ref 98–111)
CO2: 19 MEQ/L (ref 23–33)
CREAT SERPL-MCNC: 0.8 MG/DL (ref 0.4–1.2)
CREATININE URINE: 240.9 MG/DL
EOSINOPHIL # BLD: 1 %
EOSINOPHILS ABSOLUTE: 0.1 THOU/MM3 (ref 0–0.4)
ERYTHROCYTE [DISTWIDTH] IN BLOOD BY AUTOMATED COUNT: 13.6 % (ref 11.5–14.5)
ERYTHROCYTE [DISTWIDTH] IN BLOOD BY AUTOMATED COUNT: 41.2 FL (ref 35–45)
GFR SERPL CREATININE-BSD FRML MDRD: 87 ML/MIN/1.73M2
GLUCOSE BLD-MCNC: 86 MG/DL (ref 70–108)
HCT VFR BLD CALC: 35.5 % (ref 37–47)
HEMOGLOBIN: 11.8 GM/DL (ref 12–16)
IMMATURE GRANS (ABS): 0.1 THOU/MM3 (ref 0–0.07)
IMMATURE GRANULOCYTES: 0.9 %
LYMPHOCYTES # BLD: 14.2 %
LYMPHOCYTES ABSOLUTE: 1.6 THOU/MM3 (ref 1–4.8)
MCH RBC QN AUTO: 27.9 PG (ref 26–33)
MCHC RBC AUTO-ENTMCNC: 33.2 GM/DL (ref 32.2–35.5)
MCV RBC AUTO: 83.9 FL (ref 81–99)
MONOCYTES # BLD: 7.9 %
MONOCYTES ABSOLUTE: 0.9 THOU/MM3 (ref 0.4–1.3)
NUCLEATED RED BLOOD CELLS: 0 /100 WBC
PLATELET # BLD: 208 THOU/MM3 (ref 130–400)
PMV BLD AUTO: 11 FL (ref 9.4–12.4)
POTASSIUM SERPL-SCNC: 3.8 MEQ/L (ref 3.5–5.2)
PROT/CREAT RATIO, UR: 0.1
PROTEIN, URINE: 24.2 MG/DL
RBC # BLD: 4.23 MILL/MM3 (ref 4.2–5.4)
SEG NEUTROPHILS: 75.7 %
SEGMENTED NEUTROPHILS ABSOLUTE COUNT: 8.5 THOU/MM3 (ref 1.8–7.7)
SODIUM BLD-SCNC: 136 MEQ/L (ref 135–145)
TOTAL PROTEIN: 6.1 G/DL (ref 6.1–8)
WBC # BLD: 11.2 THOU/MM3 (ref 4.8–10.8)

## 2022-09-10 PROCEDURE — 82570 ASSAY OF URINE CREATININE: CPT

## 2022-09-10 PROCEDURE — 85025 COMPLETE CBC W/AUTO DIFF WBC: CPT

## 2022-09-10 PROCEDURE — 84156 ASSAY OF PROTEIN URINE: CPT

## 2022-09-10 PROCEDURE — 2500000003 HC RX 250 WO HCPCS

## 2022-09-10 PROCEDURE — 80053 COMPREHEN METABOLIC PANEL: CPT

## 2022-09-10 RX ORDER — ASPIRIN 81 MG/1
81 TABLET ORAL DAILY
COMMUNITY
End: 2022-09-18

## 2022-09-10 NOTE — FLOWSHEET NOTE
Patient arrived ambulatory to labor and delivery unit with c/o elevated BP at home. States she was at the dentist on Thursday and her BP was elevated, she has been checking it at home and it has continued to be high so she came in to be evaluated per her mother's recommendations. BP has ranged from 160-170/80-90, 135/90, 155/86, 144/90. Patient denies any headache or vision changes. States she has had epigastric pain but that has been ongoing for the last 2 weeks. States she has been having swelling in her ankles and feet. Reports good fetal movement, no vaginal bleeding or leaking of fluids. Patient changed into gown and oriented to room.

## 2022-09-10 NOTE — FLOWSHEET NOTE
Dr. Adri James called and informed of pt arrival to unit. , 37 weeks + 1 day, pt of Dr. Kayla Calix here with c/o high blood pressures. Pt states she had BPs 160-170s/80-90s at her dentist appointment on Thursday so she has been checking BPs at home and had some elevated BPs. Reviewed admission BPs with MD. +1 edema in bilateral feet and ankles. Denies other complaints at this time. FHR reactive. Occasional ctx noted on monitor, pt denies feeling any ctx. Cervix closed her last office appointment. Orders received for blood work and to cycle BPs.

## 2022-09-11 NOTE — PLAN OF CARE
Problem: Pain  Goal: Verbalizes/displays adequate comfort level or baseline comfort level  Outcome: Progressing  Flowsheets (Taken 9/10/2022 2013)  Verbalizes/displays adequate comfort level or baseline comfort level:   Encourage patient to monitor pain and request assistance   Administer analgesics based on type and severity of pain and evaluate response   Consider cultural and social influences on pain and pain management   Assess pain using appropriate pain scale   Implement non-pharmacological measures as appropriate and evaluate response     Problem: Safety - Adult  Goal: Free from fall injury  Outcome: Progressing  Flowsheets (Taken 9/10/2022 2013)  Free From Fall Injury:   Instruct family/caregiver on patient safety   Based on caregiver fall risk screen, instruct family/caregiver to ask for assistance with transferring infant if caregiver noted to have fall risk factors     Problem: Discharge Planning  Goal: Discharge to home or other facility with appropriate resources  Outcome: Progressing  Flowsheets (Taken 9/10/2022 2013)  Discharge to home or other facility with appropriate resources:   Identify barriers to discharge with patient and caregiver   Identify discharge learning needs (meds, wound care, etc)   Refer to discharge planning if patient needs post-hospital services based on physician order or complex needs related to functional status, cognitive ability or social support system   Arrange for needed discharge resources and transportation as appropriate     Problem: Chronic Conditions and Co-morbidities  Goal: Patient's chronic conditions and co-morbidity symptoms are monitored and maintained or improved  Outcome: Progressing  Flowsheets (Taken 9/10/2022 2013)  Care Plan - Patient's Chronic Conditions and Co-Morbidity Symptoms are Monitored and Maintained or Improved:   Collaborate with multidisciplinary team to address chronic and comorbid conditions and prevent exacerbation or deterioration Monitor and assess patient's chronic conditions and comorbid symptoms for stability, deterioration, or improvement    Care plan reviewed with patient and . Patient and  verbalize understanding of the plan of care and contribute to goal setting.

## 2022-09-11 NOTE — DISCHARGE INSTRUCTIONS
Home Undelivered Discharge Instructions    After Discharge Orders:           Diet: regular diet   Increase fluid intake to 8-10 glasses of water daily    Rest: normal activity as tolerated    Other instructions: Do kick counts once a day on your baby. Choose the time of day your baby is most active. Get in a comfortable lying or sitting position and time how long it takes to feel 10 kicks, twists, turns, swishes, or rolls. Call Your Doctor if Any of the Following Occurs   Leaking fluid from vagina with or without contractions  Bright red vaginal bleeding occurs that is as heavy as or heavier than a period  Regular contractions are longer, stronger, and closer together  Noticeable decreased fetal movement  Elevated temperature >100.5°F and chills  Blurred vision, spots before eyes, unrelievable headache, severe facial swelling, or upper abdominal pain  Contact physician or hospital OB unit if signs of premature labor occur at ?36 weeks  Persistent or rhythmic low back pain that feels different than you are used to  Menstrual like cramps  Intestinal cramps with or without diarrhea  Pelvic pressure or rhythmic tightening that feels different than you are used to  [facility] discharge or a gush of fluid from your vagina  Vaginal bleeding as heavy as a period  General Information     Difference between:  False Labor True Labor   1. Contractions often are irregular and don't consistently get closer together (called [place] contractions) 1. Contractions come at regular intervals and, as time goes on, get closer together. 2. Contractions may often stop when you rest or with a position change. 2. Contractions continue despite movement or walking. Contractions get stronger and closer together with time. 3. Often felt in the lower abdomen. 3. Usually felt in back coming around to the front.      Reminder to Patient   Please bring all teaching sheets and discharge information with you if you return to the hospital or the physician's office/clinic for follow-up care. If you have any questions, please call: \"Call-A-Nurse\" [phone] or [phone]. Reviewed Dates   5/12/2007  Document developed by   Labor and Delivery Discharge Education Form  Disclaimer: We want you to understand more clearly each of the health conditions and procedures you may have. This patient leaflet is a summary of useful information to help you gain a better understanding of these health topics. Other information about this condition or procedure may be important for you to know. Please talk with your healthcare provider for more information about your special health needs. Coronavirus ([id number]): Pregnancy, Birth and Baby Care    What do you need know if you are pregnant regarding coronavirus (COVID-19)? · From what experts know so far, pregnant people do NOT seem more likely than others to get COVID-19 and do NOT have a higher risk of severe complications. What can you do to protect yourself from COVID-19? · Practice social distancing.  When you need to leave the home try to stay at least 6 feet away from other people. Avoid large crowds. · When you leave the house to prevent spreading sickness to others - Wear a facemask covering your mouth and nose. Remove by folding outside of mask together and place in container, wash daily or as soiled. · Wash your hands often by rubbing your hands with soap and water for at least 20 seconds, rinse, and dry with a paper towel that can be thrown away or may use hand  of at least 60% alcohol. Covering all surfaces of your hands by rubbing them together until dry. · Avoid touching your face with unwashed hands, especially your mouth, nose and eyes. · Avoid traveling. What should you do if you have or think you may have COVID-19? · For most infected, this is a mild illness and you will be able to recover at home.    · To avoid spreading to others:  ? Stay home except to get medical

## 2022-09-15 ENCOUNTER — HOSPITAL ENCOUNTER (INPATIENT)
Age: 27
LOS: 3 days | Discharge: HOME | End: 2022-09-18
Attending: OBSTETRICS & GYNECOLOGY | Admitting: OBSTETRICS & GYNECOLOGY
Payer: COMMERCIAL

## 2022-09-15 PROBLEM — Z34.90 ENCOUNTER FOR INDUCTION OF LABOR: Status: ACTIVE | Noted: 2022-09-15

## 2022-09-15 LAB
ABO CHECK: NORMAL
AMPHETAMINE+METHAMPHETAMINE URINE SCREEN: NEGATIVE
ANTIBODY SCREEN: NORMAL
BARBITURATE QUANTITATIVE URINE: NEGATIVE
BASOPHILS # BLD: 0.3 %
BASOPHILS ABSOLUTE: 0 THOU/MM3 (ref 0–0.1)
BENZODIAZEPINE QUANTITATIVE URINE: NEGATIVE
CANNABINOID QUANTITATIVE URINE: NEGATIVE
COCAINE METABOLITE QUANTITATIVE URINE: NEGATIVE
EOSINOPHIL # BLD: 1.2 %
EOSINOPHILS ABSOLUTE: 0.1 THOU/MM3 (ref 0–0.4)
ERYTHROCYTE [DISTWIDTH] IN BLOOD BY AUTOMATED COUNT: 13.8 % (ref 11.5–14.5)
ERYTHROCYTE [DISTWIDTH] IN BLOOD BY AUTOMATED COUNT: 41.2 FL (ref 35–45)
HCT VFR BLD CALC: 39.5 % (ref 37–47)
HEMOGLOBIN: 13.4 GM/DL (ref 12–16)
IMMATURE GRANS (ABS): 0.09 THOU/MM3 (ref 0–0.07)
IMMATURE GRANULOCYTES: 0.9 %
LYMPHOCYTES # BLD: 15.5 %
LYMPHOCYTES ABSOLUTE: 1.5 THOU/MM3 (ref 1–4.8)
MCH RBC QN AUTO: 28.2 PG (ref 26–33)
MCHC RBC AUTO-ENTMCNC: 33.9 GM/DL (ref 32.2–35.5)
MCV RBC AUTO: 83.2 FL (ref 81–99)
MONOCYTES # BLD: 7 %
MONOCYTES ABSOLUTE: 0.7 THOU/MM3 (ref 0.4–1.3)
NUCLEATED RED BLOOD CELLS: 0 /100 WBC
OPIATES, URINE: NEGATIVE
OXYCODONE: NEGATIVE
PHENCYCLIDINE QUANTITATIVE URINE: NEGATIVE
PLATELET # BLD: 215 THOU/MM3 (ref 130–400)
PMV BLD AUTO: 11.3 FL (ref 9.4–12.4)
RBC # BLD: 4.75 MILL/MM3 (ref 4.2–5.4)
RH FACTOR: NORMAL
SEG NEUTROPHILS: 75.1 %
SEGMENTED NEUTROPHILS ABSOLUTE COUNT: 7.4 THOU/MM3 (ref 1.8–7.7)
WBC # BLD: 9.9 THOU/MM3 (ref 4.8–10.8)

## 2022-09-15 PROCEDURE — 85025 COMPLETE CBC W/AUTO DIFF WBC: CPT

## 2022-09-15 PROCEDURE — 2580000003 HC RX 258: Performed by: OBSTETRICS & GYNECOLOGY

## 2022-09-15 PROCEDURE — 86900 BLOOD TYPING SEROLOGIC ABO: CPT

## 2022-09-15 PROCEDURE — 86850 RBC ANTIBODY SCREEN: CPT

## 2022-09-15 PROCEDURE — 10907ZC DRAINAGE OF AMNIOTIC FLUID, THERAPEUTIC FROM PRODUCTS OF CONCEPTION, VIA NATURAL OR ARTIFICIAL OPENING: ICD-10-PCS | Performed by: OBSTETRICS & GYNECOLOGY

## 2022-09-15 PROCEDURE — 6360000002 HC RX W HCPCS: Performed by: OBSTETRICS & GYNECOLOGY

## 2022-09-15 PROCEDURE — 80307 DRUG TEST PRSMV CHEM ANLYZR: CPT

## 2022-09-15 PROCEDURE — 3E033VJ INTRODUCTION OF OTHER HORMONE INTO PERIPHERAL VEIN, PERCUTANEOUS APPROACH: ICD-10-PCS | Performed by: OBSTETRICS & GYNECOLOGY

## 2022-09-15 PROCEDURE — 86901 BLOOD TYPING SEROLOGIC RH(D): CPT

## 2022-09-15 PROCEDURE — 1220000001 HC SEMI PRIVATE L&D R&B

## 2022-09-15 PROCEDURE — 86592 SYPHILIS TEST NON-TREP QUAL: CPT

## 2022-09-15 RX ORDER — PROMETHAZINE HYDROCHLORIDE 25 MG/ML
25 INJECTION, SOLUTION INTRAMUSCULAR; INTRAVENOUS
Status: DISPENSED | OUTPATIENT
Start: 2022-09-15 | End: 2022-09-15

## 2022-09-15 RX ORDER — SODIUM CHLORIDE, SODIUM LACTATE, POTASSIUM CHLORIDE, CALCIUM CHLORIDE 600; 310; 30; 20 MG/100ML; MG/100ML; MG/100ML; MG/100ML
INJECTION, SOLUTION INTRAVENOUS CONTINUOUS
Status: DISCONTINUED | OUTPATIENT
Start: 2022-09-15 | End: 2022-09-16

## 2022-09-15 RX ORDER — ACETAMINOPHEN 325 MG/1
650 TABLET ORAL EVERY 4 HOURS PRN
Status: DISCONTINUED | OUTPATIENT
Start: 2022-09-15 | End: 2022-09-16 | Stop reason: HOSPADM

## 2022-09-15 RX ORDER — DIPHENHYDRAMINE HYDROCHLORIDE 50 MG/ML
25 INJECTION INTRAMUSCULAR; INTRAVENOUS EVERY 4 HOURS PRN
Status: DISCONTINUED | OUTPATIENT
Start: 2022-09-15 | End: 2022-09-16 | Stop reason: HOSPADM

## 2022-09-15 RX ORDER — SEVOFLURANE 250 ML/250ML
1 LIQUID RESPIRATORY (INHALATION) CONTINUOUS PRN
Status: DISCONTINUED | OUTPATIENT
Start: 2022-09-15 | End: 2022-09-16 | Stop reason: HOSPADM

## 2022-09-15 RX ORDER — SODIUM CHLORIDE 9 MG/ML
25 INJECTION, SOLUTION INTRAVENOUS PRN
Status: DISCONTINUED | OUTPATIENT
Start: 2022-09-15 | End: 2022-09-16 | Stop reason: HOSPADM

## 2022-09-15 RX ORDER — BUTORPHANOL TARTRATE 1 MG/ML
1 INJECTION, SOLUTION INTRAMUSCULAR; INTRAVENOUS
Status: DISCONTINUED | OUTPATIENT
Start: 2022-09-15 | End: 2022-09-16 | Stop reason: HOSPADM

## 2022-09-15 RX ORDER — ONDANSETRON 2 MG/ML
8 INJECTION INTRAMUSCULAR; INTRAVENOUS EVERY 6 HOURS PRN
Status: DISCONTINUED | OUTPATIENT
Start: 2022-09-15 | End: 2022-09-16 | Stop reason: HOSPADM

## 2022-09-15 RX ORDER — SODIUM CHLORIDE 0.9 % (FLUSH) 0.9 %
5-40 SYRINGE (ML) INJECTION EVERY 12 HOURS SCHEDULED
Status: DISCONTINUED | OUTPATIENT
Start: 2022-09-15 | End: 2022-09-16 | Stop reason: HOSPADM

## 2022-09-15 RX ORDER — MORPHINE SULFATE 4 MG/ML
4 INJECTION, SOLUTION INTRAMUSCULAR; INTRAVENOUS
Status: DISCONTINUED | OUTPATIENT
Start: 2022-09-15 | End: 2022-09-16 | Stop reason: HOSPADM

## 2022-09-15 RX ORDER — TRANEXAMIC ACID 10 MG/ML
1000 INJECTION, SOLUTION INTRAVENOUS
Status: ACTIVE | OUTPATIENT
Start: 2022-09-15 | End: 2022-09-15

## 2022-09-15 RX ORDER — MISOPROSTOL 200 UG/1
1000 TABLET ORAL PRN
Status: DISCONTINUED | OUTPATIENT
Start: 2022-09-15 | End: 2022-09-16 | Stop reason: HOSPADM

## 2022-09-15 RX ORDER — CARBOPROST TROMETHAMINE 250 UG/ML
250 INJECTION, SOLUTION INTRAMUSCULAR PRN
Status: DISCONTINUED | OUTPATIENT
Start: 2022-09-15 | End: 2022-09-16 | Stop reason: HOSPADM

## 2022-09-15 RX ORDER — MORPHINE SULFATE 2 MG/ML
2 INJECTION, SOLUTION INTRAMUSCULAR; INTRAVENOUS
Status: DISCONTINUED | OUTPATIENT
Start: 2022-09-15 | End: 2022-09-16 | Stop reason: HOSPADM

## 2022-09-15 RX ORDER — SODIUM CHLORIDE 9 MG/ML
INJECTION, SOLUTION INTRAVENOUS CONTINUOUS
Status: DISCONTINUED | OUTPATIENT
Start: 2022-09-15 | End: 2022-09-16

## 2022-09-15 RX ORDER — LIDOCAINE HYDROCHLORIDE 10 MG/ML
30 INJECTION, SOLUTION EPIDURAL; INFILTRATION; INTRACAUDAL; PERINEURAL PRN
Status: DISCONTINUED | OUTPATIENT
Start: 2022-09-15 | End: 2022-09-16 | Stop reason: HOSPADM

## 2022-09-15 RX ORDER — SODIUM CHLORIDE 0.9 % (FLUSH) 0.9 %
5-40 SYRINGE (ML) INJECTION PRN
Status: DISCONTINUED | OUTPATIENT
Start: 2022-09-15 | End: 2022-09-16 | Stop reason: HOSPADM

## 2022-09-15 RX ORDER — IBUPROFEN 800 MG/1
800 TABLET ORAL EVERY 8 HOURS PRN
Status: DISCONTINUED | OUTPATIENT
Start: 2022-09-15 | End: 2022-09-16 | Stop reason: HOSPADM

## 2022-09-15 RX ORDER — SODIUM CHLORIDE, SODIUM LACTATE, POTASSIUM CHLORIDE, AND CALCIUM CHLORIDE .6; .31; .03; .02 G/100ML; G/100ML; G/100ML; G/100ML
1000 INJECTION, SOLUTION INTRAVENOUS PRN
Status: DISCONTINUED | OUTPATIENT
Start: 2022-09-15 | End: 2022-09-16 | Stop reason: HOSPADM

## 2022-09-15 RX ORDER — METHYLERGONOVINE MALEATE 0.2 MG/ML
200 INJECTION INTRAVENOUS PRN
Status: DISCONTINUED | OUTPATIENT
Start: 2022-09-15 | End: 2022-09-16 | Stop reason: HOSPADM

## 2022-09-15 RX ORDER — SODIUM CHLORIDE, SODIUM LACTATE, POTASSIUM CHLORIDE, AND CALCIUM CHLORIDE .6; .31; .03; .02 G/100ML; G/100ML; G/100ML; G/100ML
500 INJECTION, SOLUTION INTRAVENOUS PRN
Status: DISCONTINUED | OUTPATIENT
Start: 2022-09-15 | End: 2022-09-16 | Stop reason: HOSPADM

## 2022-09-15 RX ORDER — TERBUTALINE SULFATE 1 MG/ML
0.25 INJECTION, SOLUTION SUBCUTANEOUS
Status: ACTIVE | OUTPATIENT
Start: 2022-09-15 | End: 2022-09-15

## 2022-09-15 RX ORDER — MEPERIDINE HYDROCHLORIDE 25 MG/ML
50 INJECTION INTRAMUSCULAR; INTRAVENOUS; SUBCUTANEOUS
Status: ACTIVE | OUTPATIENT
Start: 2022-09-15 | End: 2022-09-15

## 2022-09-15 RX ADMIN — Medication 1 MILLI-UNITS/MIN: at 14:13

## 2022-09-15 RX ADMIN — SODIUM CHLORIDE, POTASSIUM CHLORIDE, SODIUM LACTATE AND CALCIUM CHLORIDE: 600; 310; 30; 20 INJECTION, SOLUTION INTRAVENOUS at 13:28

## 2022-09-15 RX ADMIN — BUTORPHANOL TARTRATE 1 MG: 1 INJECTION, SOLUTION INTRAMUSCULAR; INTRAVENOUS at 23:56

## 2022-09-15 RX ADMIN — SODIUM CHLORIDE, POTASSIUM CHLORIDE, SODIUM LACTATE AND CALCIUM CHLORIDE: 600; 310; 30; 20 INJECTION, SOLUTION INTRAVENOUS at 19:04

## 2022-09-15 NOTE — PLAN OF CARE
Problem: Vaginal Birth or  Section  Goal: Fetal and maternal status remain reassuring during the birth process  Description:  Birth OB-Pregnancy care plan goal which identifies if the fetal and maternal status remain reassuring during the birth process  Outcome: Progressing  Flowsheets (Taken 9/15/2022 1459)  Fetal and Maternal Status Remain Reassuring During the Birth Process:   Monitor fetal heart rate   Monitor vital signs   Monitor uterine activity   Monitor labor progression (Vaginal delivery)     Problem: Infection - Adult  Goal: Absence of infection during hospitalization  Outcome: Progressing  Flowsheets (Taken 9/15/2022 1459)  Absence of infection during hospitalization:   Assess and monitor for signs and symptoms of infection   Monitor lab/diagnostic results   Monitor all insertion sites i.e., indwelling lines, tubes and drains   Administer medications as ordered   Instruct and encourage patient and family to use good hand hygiene technique     Problem: Safety - Adult  Goal: Free from fall injury  Outcome: Progressing  Flowsheets (Taken 9/15/2022 1320)  Free From Fall Injury: Instruct family/caregiver on patient safety     Problem: Discharge Planning  Goal: Discharge to home or other facility with appropriate resources  Outcome: Progressing  Flowsheets (Taken 9/15/2022 1320)  Discharge to home or other facility with appropriate resources:   Identify barriers to discharge with patient and caregiver   Arrange for needed discharge resources and transportation as appropriate     Problem: Chronic Conditions and Co-morbidities  Goal: Patient's chronic conditions and co-morbidity symptoms are monitored and maintained or improved  Outcome: Progressing  Flowsheets (Taken 9/15/2022 1452)  Care Plan - Patient's Chronic Conditions and Co-Morbidity Symptoms are Monitored and Maintained or Improved: Monitor and assess patient's chronic conditions and comorbid symptoms for stability, deterioration, or improvement  Note: Monitoring blood pressures for gestational hypertension    Care plan reviewed with patient and Marii Weber. Patient and Marii Weber verbalize understanding of the plan of care and contribute to goal setting.

## 2022-09-15 NOTE — H&P
[address] [address]  History and Physical Update    Pt Name: Laura Brady  MRN: [id number]  YOB: 1995  Date of evaluation: 9/15/2022    [] I have examined the patient and reviewed the H&P/Consult and there are no changes to the patient or plans. [x] I have examined the patient and reviewed the H&P/Consult and have noted the following changes:   26yo  at 37/6wks presented for IOL due to [facility]. CVX 1/thick. Intracervical mosher placed in normal sterile fashion. FHT category 1. GBS negative.             Patricio Dunn MD,MD  Electronically signed 9/15/2022 at 2:07 PM

## 2022-09-15 NOTE — PROGRESS NOTES
CVX 3/60/-3. AROM with FSE and clear fluid return. FHT category1. Continue to titrate pitocin for active labor.

## 2022-09-15 NOTE — FLOWSHEET NOTE
Pt arrived from office for scheduled induction for gestational hypertension.  37+6wks. Dr. Nick Khan is covering the patient since Dr. Dillan Hickman is out of town this week. Pt denies leaking of fluid, vaginal bleeding or ctx. +FM. Informed of maternal drug testing policy in place on all laboring patients. Verbal consent received, paper consent to be signed and urine to be sent when patient voids next.

## 2022-09-15 NOTE — PLAN OF CARE
Problem: Vaginal Birth or  Section  Goal: Fetal and maternal status remain reassuring during the birth process  Description:  Birth OB-Pregnancy care plan goal which identifies if the fetal and maternal status remain reassuring during the birth process  9/15/2022 1921 by David Bearden RN  Outcome: Progressing  Flowsheets (Taken 9/15/2022 1459 by Galilea Borja RN)  Fetal and Maternal Status Remain Reassuring During the Birth Process:   Monitor fetal heart rate   Monitor vital signs   Monitor uterine activity   Monitor labor progression (Vaginal delivery)     Problem: Infection - Adult  Goal: Absence of infection during hospitalization  9/15/2022 1921 by David Bearden RN  Outcome: Progressing  Flowsheets (Taken 9/15/2022 1459 by Galilea Borja RN)  Absence of infection during hospitalization:   Assess and monitor for signs and symptoms of infection   Monitor lab/diagnostic results   Monitor all insertion sites i.e., indwelling lines, tubes and drains   Administer medications as ordered   Instruct and encourage patient and family to use good hand hygiene technique     Problem: Safety - Adult  Goal: Free from fall injury  9/15/2022 1921 by David Bearden RN  Outcome: Progressing  Flowsheets (Taken 9/15/2022 1320 by Galilea Borja RN)  Free From Fall Injury: Instruct family/caregiver on patient safety     Problem: Discharge Planning  Goal: Discharge to home or other facility with appropriate resources  9/15/2022 1921 by David Bearden RN  Outcome: Progressing  Flowsheets (Taken 9/15/2022 1320 by Galilea Borja RN)  Discharge to home or other facility with appropriate resources:   Identify barriers to discharge with patient and caregiver   Arrange for needed discharge resources and transportation as appropriate     Problem: Chronic Conditions and Co-morbidities  Goal: Patient's chronic conditions and co-morbidity symptoms are monitored and maintained or improved  9/15/2022 1921 by David Bearden RN  Outcome: Progressing  Flowsheets (Taken 9/15/2022 1459 by Galilea Borja, RN)  Care Plan - Patient's Chronic Conditions and Co-Morbidity Symptoms are Monitored and Maintained or Improved: Monitor and assess patient's chronic conditions and comorbid symptoms for stability, deterioration, or improvement     Problem: Pain  Goal: Verbalizes/displays adequate comfort level or baseline comfort level  Outcome: Progressing  Flowsheets (Taken 9/15/2022 1320 by Galilea Borja RN)  Verbalizes/displays adequate comfort level or baseline comfort level:   Encourage patient to monitor pain and request assistance   Assess pain using appropriate pain scale   Administer analgesics based on type and severity of pain and evaluate response   Implement non-pharmacological measures as appropriate and evaluate response   Notify Licensed Independent Practitioner if interventions unsuccessful or patient reports new pain     Care plan reviewed with patient and . Patient and  verbalize understanding of the plan of care and contribute to goal setting.

## 2022-09-15 NOTE — FLOWSHEET NOTE
Dr. Merlin Braver at bedside discussing plans for either cervical mosher ripening placement if able to or cytotec induction. Pt up to BR to void.  Will attempt when returns to bed

## 2022-09-15 NOTE — FLOWSHEET NOTE
Cervical Mosher Ripening placed in cervix per dr. Nayeli Ragland. Balloon inflated with 60cc NS. NS running through cervical mosher at 60cc/hr of NS. Pt tolerated placement. Orders received from Dr. Nayeli Ragland. Pt able to get light lunch but no more food after. Pitocin increased up to 10miliunit/min until cervical mosher out of cervix.

## 2022-09-15 NOTE — FLOWSHEET NOTE
Pt states she feels very tense and difficult to relax. Pt up to BR to void, when returned to bed discussed some relaxation techniques we can try.  at side agreeing to help. Pt to left lateral, efm adjusted. Discussed techniques and teaching Angela Wagner how to do them. Pt visitor arrived with pt's food. Will attempt techniques after pt finished eating. Pt sitting up a little so she can eat her food.

## 2022-09-16 ENCOUNTER — ANESTHESIA EVENT (OUTPATIENT)
Dept: LABOR AND DELIVERY | Age: 27
End: 2022-09-16
Payer: COMMERCIAL

## 2022-09-16 ENCOUNTER — ANESTHESIA (OUTPATIENT)
Dept: LABOR AND DELIVERY | Age: 27
End: 2022-09-16
Payer: COMMERCIAL

## 2022-09-16 LAB — RPR: NONREACTIVE

## 2022-09-16 PROCEDURE — 1220000000 HC SEMI PRIVATE OB R&B

## 2022-09-16 PROCEDURE — 6360000002 HC RX W HCPCS: Performed by: OBSTETRICS & GYNECOLOGY

## 2022-09-16 PROCEDURE — 0KQM0ZZ REPAIR PERINEUM MUSCLE, OPEN APPROACH: ICD-10-PCS | Performed by: OBSTETRICS & GYNECOLOGY

## 2022-09-16 PROCEDURE — 2580000003 HC RX 258: Performed by: OBSTETRICS & GYNECOLOGY

## 2022-09-16 PROCEDURE — 6360000002 HC RX W HCPCS

## 2022-09-16 PROCEDURE — 7200000001 HC VAGINAL DELIVERY

## 2022-09-16 PROCEDURE — 2500000003 HC RX 250 WO HCPCS: Performed by: NURSE ANESTHETIST, CERTIFIED REGISTERED

## 2022-09-16 PROCEDURE — 3700000025 EPIDURAL BLOCK: Performed by: ANESTHESIOLOGY

## 2022-09-16 PROCEDURE — 6370000000 HC RX 637 (ALT 250 FOR IP): Performed by: OBSTETRICS & GYNECOLOGY

## 2022-09-16 PROCEDURE — 6360000002 HC RX W HCPCS: Performed by: STUDENT IN AN ORGANIZED HEALTH CARE EDUCATION/TRAINING PROGRAM

## 2022-09-16 RX ORDER — METHYLERGONOVINE MALEATE 0.2 MG/ML
200 INJECTION INTRAVENOUS PRN
Status: DISCONTINUED | OUTPATIENT
Start: 2022-09-16 | End: 2022-09-18 | Stop reason: HOSPADM

## 2022-09-16 RX ORDER — HYDROXYZINE PAMOATE 25 MG/1
25 CAPSULE ORAL
Status: DISCONTINUED | OUTPATIENT
Start: 2022-09-16 | End: 2022-09-16

## 2022-09-16 RX ORDER — ROPIVACAINE HYDROCHLORIDE 2 MG/ML
INJECTION, SOLUTION EPIDURAL; INFILTRATION; PERINEURAL
Status: COMPLETED
Start: 2022-09-16 | End: 2022-09-16

## 2022-09-16 RX ORDER — SODIUM CHLORIDE 0.9 % (FLUSH) 0.9 %
5-40 SYRINGE (ML) INJECTION PRN
Status: DISCONTINUED | OUTPATIENT
Start: 2022-09-16 | End: 2022-09-18 | Stop reason: HOSPADM

## 2022-09-16 RX ORDER — OXYCODONE HYDROCHLORIDE 5 MG/1
5 TABLET ORAL EVERY 4 HOURS PRN
Status: DISCONTINUED | OUTPATIENT
Start: 2022-09-16 | End: 2022-09-18 | Stop reason: HOSPADM

## 2022-09-16 RX ORDER — SODIUM CHLORIDE 9 MG/ML
INJECTION, SOLUTION INTRAVENOUS PRN
Status: DISCONTINUED | OUTPATIENT
Start: 2022-09-16 | End: 2022-09-18 | Stop reason: HOSPADM

## 2022-09-16 RX ORDER — FERROUS SULFATE 325(65) MG
325 TABLET ORAL
Status: DISCONTINUED | OUTPATIENT
Start: 2022-09-17 | End: 2022-09-18 | Stop reason: HOSPADM

## 2022-09-16 RX ORDER — LIDOCAINE HYDROCHLORIDE AND EPINEPHRINE 20; 5 MG/ML; UG/ML
INJECTION, SOLUTION EPIDURAL; INFILTRATION; INTRACAUDAL; PERINEURAL PRN
Status: DISCONTINUED | OUTPATIENT
Start: 2022-09-16 | End: 2022-09-16 | Stop reason: SDUPTHER

## 2022-09-16 RX ORDER — DOCUSATE SODIUM 100 MG/1
100 CAPSULE, LIQUID FILLED ORAL 2 TIMES DAILY PRN
Status: DISCONTINUED | OUTPATIENT
Start: 2022-09-16 | End: 2022-09-18 | Stop reason: HOSPADM

## 2022-09-16 RX ORDER — FAMOTIDINE 20 MG/1
20 TABLET, FILM COATED ORAL 2 TIMES DAILY PRN
Status: DISCONTINUED | OUTPATIENT
Start: 2022-09-16 | End: 2022-09-18 | Stop reason: HOSPADM

## 2022-09-16 RX ORDER — LIDOCAINE HYDROCHLORIDE 10 MG/ML
INJECTION, SOLUTION INFILTRATION; PERINEURAL PRN
Status: DISCONTINUED | OUTPATIENT
Start: 2022-09-16 | End: 2022-09-16 | Stop reason: SDUPTHER

## 2022-09-16 RX ORDER — MORPHINE SULFATE 4 MG/ML
4 INJECTION, SOLUTION INTRAMUSCULAR; INTRAVENOUS
Status: DISCONTINUED | OUTPATIENT
Start: 2022-09-16 | End: 2022-09-18 | Stop reason: HOSPADM

## 2022-09-16 RX ORDER — IBUPROFEN 800 MG/1
800 TABLET ORAL EVERY 8 HOURS PRN
Status: DISCONTINUED | OUTPATIENT
Start: 2022-09-16 | End: 2022-09-18 | Stop reason: HOSPADM

## 2022-09-16 RX ORDER — SODIUM CHLORIDE, SODIUM LACTATE, POTASSIUM CHLORIDE, CALCIUM CHLORIDE 600; 310; 30; 20 MG/100ML; MG/100ML; MG/100ML; MG/100ML
INJECTION, SOLUTION INTRAVENOUS CONTINUOUS
Status: DISCONTINUED | OUTPATIENT
Start: 2022-09-16 | End: 2022-09-18 | Stop reason: HOSPADM

## 2022-09-16 RX ORDER — OXYCODONE HYDROCHLORIDE 5 MG/1
10 TABLET ORAL EVERY 4 HOURS PRN
Status: DISCONTINUED | OUTPATIENT
Start: 2022-09-16 | End: 2022-09-18 | Stop reason: HOSPADM

## 2022-09-16 RX ORDER — CARBOPROST TROMETHAMINE 250 UG/ML
250 INJECTION, SOLUTION INTRAMUSCULAR PRN
Status: DISCONTINUED | OUTPATIENT
Start: 2022-09-16 | End: 2022-09-18 | Stop reason: HOSPADM

## 2022-09-16 RX ORDER — ROPIVACAINE HYDROCHLORIDE 2 MG/ML
INJECTION, SOLUTION EPIDURAL; INFILTRATION; PERINEURAL
Status: DISCONTINUED
Start: 2022-09-16 | End: 2022-09-16 | Stop reason: WASHOUT

## 2022-09-16 RX ORDER — ROPIVACAINE HYDROCHLORIDE 2 MG/ML
INJECTION, SOLUTION EPIDURAL; INFILTRATION; PERINEURAL PRN
Status: DISCONTINUED | OUTPATIENT
Start: 2022-09-16 | End: 2022-09-16 | Stop reason: SDUPTHER

## 2022-09-16 RX ORDER — ONDANSETRON 2 MG/ML
4 INJECTION INTRAMUSCULAR; INTRAVENOUS EVERY 6 HOURS PRN
Status: DISCONTINUED | OUTPATIENT
Start: 2022-09-16 | End: 2022-09-18 | Stop reason: HOSPADM

## 2022-09-16 RX ORDER — ONDANSETRON 4 MG/1
8 TABLET, ORALLY DISINTEGRATING ORAL EVERY 8 HOURS PRN
Status: DISCONTINUED | OUTPATIENT
Start: 2022-09-16 | End: 2022-09-18 | Stop reason: HOSPADM

## 2022-09-16 RX ORDER — MORPHINE SULFATE 2 MG/ML
2 INJECTION, SOLUTION INTRAMUSCULAR; INTRAVENOUS
Status: DISCONTINUED | OUTPATIENT
Start: 2022-09-16 | End: 2022-09-18 | Stop reason: HOSPADM

## 2022-09-16 RX ORDER — SODIUM CHLORIDE 0.9 % (FLUSH) 0.9 %
5-40 SYRINGE (ML) INJECTION EVERY 12 HOURS SCHEDULED
Status: DISCONTINUED | OUTPATIENT
Start: 2022-09-16 | End: 2022-09-18 | Stop reason: HOSPADM

## 2022-09-16 RX ORDER — ACETAMINOPHEN 500 MG
1000 TABLET ORAL EVERY 8 HOURS PRN
Status: DISCONTINUED | OUTPATIENT
Start: 2022-09-16 | End: 2022-09-18 | Stop reason: HOSPADM

## 2022-09-16 RX ORDER — MODIFIED LANOLIN
OINTMENT (GRAM) TOPICAL PRN
Status: DISCONTINUED | OUTPATIENT
Start: 2022-09-16 | End: 2022-09-18 | Stop reason: HOSPADM

## 2022-09-16 RX ORDER — MISOPROSTOL 200 UG/1
1000 TABLET ORAL PRN
Status: DISCONTINUED | OUTPATIENT
Start: 2022-09-16 | End: 2022-09-18 | Stop reason: HOSPADM

## 2022-09-16 RX ADMIN — Medication 166.7 ML: at 14:54

## 2022-09-16 RX ADMIN — LIDOCAINE HYDROCHLORIDE AND EPINEPHRINE 2 ML: 20; 5 INJECTION, SOLUTION EPIDURAL; INFILTRATION; INTRACAUDAL; PERINEURAL at 01:39

## 2022-09-16 RX ADMIN — BENZOCAINE AND LEVOMENTHOL: 200; 5 SPRAY TOPICAL at 15:49

## 2022-09-16 RX ADMIN — Medication 18 ML/HR: at 01:42

## 2022-09-16 RX ADMIN — SODIUM CHLORIDE, POTASSIUM CHLORIDE, SODIUM LACTATE AND CALCIUM CHLORIDE: 600; 310; 30; 20 INJECTION, SOLUTION INTRAVENOUS at 14:55

## 2022-09-16 RX ADMIN — ROPIVACAINE HYDROCHLORIDE 10 ML: 2 INJECTION, SOLUTION EPIDURAL; INFILTRATION at 10:13

## 2022-09-16 RX ADMIN — SODIUM CHLORIDE, POTASSIUM CHLORIDE, SODIUM LACTATE AND CALCIUM CHLORIDE: 600; 310; 30; 20 INJECTION, SOLUTION INTRAVENOUS at 10:25

## 2022-09-16 RX ADMIN — LIDOCAINE HYDROCHLORIDE 3 ML: 10 INJECTION, SOLUTION INFILTRATION; PERINEURAL at 01:22

## 2022-09-16 RX ADMIN — IBUPROFEN 800 MG: 800 TABLET, FILM COATED ORAL at 15:45

## 2022-09-16 RX ADMIN — SODIUM CHLORIDE, POTASSIUM CHLORIDE, SODIUM LACTATE AND CALCIUM CHLORIDE: 600; 310; 30; 20 INJECTION, SOLUTION INTRAVENOUS at 03:10

## 2022-09-16 RX ADMIN — ONDANSETRON 8 MG: 2 INJECTION INTRAMUSCULAR; INTRAVENOUS at 03:10

## 2022-09-16 RX ADMIN — LIDOCAINE HYDROCHLORIDE AND EPINEPHRINE 3 ML: 20; 5 INJECTION, SOLUTION EPIDURAL; INFILTRATION; INTRACAUDAL; PERINEURAL at 01:35

## 2022-09-16 RX ADMIN — Medication 10 ML: at 01:41

## 2022-09-16 ASSESSMENT — PAIN SCALES - GENERAL
PAINLEVEL_OUTOF10: 3
PAINLEVEL_OUTOF10: 0
PAINLEVEL_OUTOF10: 2
PAINLEVEL_OUTOF10: 0

## 2022-09-16 ASSESSMENT — PAIN DESCRIPTION - DESCRIPTORS
DESCRIPTORS: CRAMPING
DESCRIPTORS: CRAMPING;SHARP;PRESSURE

## 2022-09-16 NOTE — LACTATION NOTE
Breastfeeding book provided. General breastfeeding information provided. Discussed frequency and duration of latching. Discussed ways to wake a sleepy infant, STS, and burping prior to feeds. Will follow up PRN.

## 2022-09-16 NOTE — FLOWSHEET NOTE
Updated Dr. Nikia Beth regarding BP values, FHTs, contraction pattern, pitocin titration, pain level. Pt plans to get epidural, MD states okay to wait until pt comfortable with epidural before re-assessing SVE unless other indication to d/t pt not tolerating cervical exams.

## 2022-09-16 NOTE — DISCHARGE SUMMARY
Obstetric Discharge Summary      Pt Name: Ekaterina Carrion  MRN: [id number] Kimthiagolyssveta #: [de-identified]  YOB: 1995        Admitting Diagnosis  24 y.o.  @ 41 wks IUP presented with IOL for GHTN. Reasons for Admission on 9/15/2022  1:15 PM  Vaginal Delivery    Hospital course: 24 y.o.  @ 38 wks IUP presented with IOL for GHTN. She progressed well and delivered via . Her post partum course was uncomplicated. She was discharged home on PPD2 in good condition. Postpartum/Operative Complications  none    Discharge Diagnosis  female infant    GHTN    Discharge Information  Current Discharge Medication List        CONTINUE these medications which have NOT CHANGED    Details   aspirin 81 MG EC tablet Take 81 mg by mouth daily      Prenatal MV-Min-Fe Fum-FA-DHA (PRENATAL 1 PO) Take by mouth               Diet: regular  Activity: nothing in the vagina for 6 weeks-no sex, no tampons, no douching, no heavy lifting, limited activity for 2-3 weeks  Discharge to:  home  Follow up in 1 wk for BP check.     Valeriy Villavicencio MD  8:35 AM.  2022

## 2022-09-16 NOTE — FLOWSHEET NOTE
Admitted to 5A 14 in wheel chair with baby in arms, voided x1 in L&D< will call for assist when up next time. Oriented to room and unit.

## 2022-09-16 NOTE — FLOWSHEET NOTE
295.1 mls infused from epidural, rest wasted second RN Regino Gupta [id number]. Unable to waste on the [facility] since it is read only.

## 2022-09-16 NOTE — ANESTHESIA PRE PROCEDURE
Department of Anesthesiology  Preprocedure Note       Name:  Neil Hooks   Age:  24 y.o.  :  1995                                          MRN:  [id number]         Date:  2022      Surgeon: * No surgeons listed *    Procedure: * No procedures listed *    Medications prior to admission:   Prior to Admission medications    Medication Sig Start Date End Date Taking?  Authorizing Provider   aspirin 81 MG EC tablet Take 81 mg by mouth daily    Historical Provider, MD   Prenatal MV-Min-Fe Fum-FA-DHA (PRENATAL 1 PO) Take by mouth    Historical Provider, MD       Current medications:    Current Facility-Administered Medications   Medication Dose Route Frequency Provider Last Rate Last Admin    0.9 % sodium chloride infusion   IntraCATHeter Continuous Luisa Matt MD        oxytocin (PITOCIN) 30 units in 500 mL infusion  1 chandrika-units/min IntraVENous Continuous Luisa Matt MD 8 mL/hr at 09/15/22 2234 8 chandrika-units/min at 09/15/22 2234    lactated ringers infusion   IntraVENous Continuous Luisa Matt  mL/hr at 09/15/22 2234 Rate Verify at 09/15/22 2234    lactated ringers bolus  500 mL IntraVENous PRN Luisa Matt MD        Or    lactated ringers bolus  1,000 mL IntraVENous PRN Luisa Matt MD        sodium chloride flush 0.9 % injection 5-40 mL  5-40 mL IntraVENous 2 times per day Luisa Matt MD        sodium chloride flush 0.9 % injection 5-40 mL  5-40 mL IntraVENous PRN Luisa Matt MD        0.9 % sodium chloride infusion  25 mL IntraVENous PRN Luisa Matt MD        lidocaine PF 1 % injection 30 mL  30 mL Other PRN Luisa Matt MD        butorphanol (STADOL) injection 1 mg  1 mg IntraVENous Q1H PRN Luisa Matt MD   1 mg at 09/15/22 2356    nitrous oxide 50% inhalation 1 each  1 each Inhalation Continuous PRN Luisa Matt MD        ondansetron [facility]) injection 8 mg  8 mg IntraVENous Q6H PRN Luisa Matt MD        diphenhydrAMINE (BENADRYL) injection 25 mg  25 mg IntraVENous Q4H PRN Justin Gaspar MD        oxytocin (PITOCIN) 30 units in 500 mL infusion  87.3 chandrika-units/min IntraVENous PRN Justin Gaspar MD        And    oxytocin (PITOCIN) 10 unit bolus from the bag  10 Units IntraVENous PRN Justin Gaspar MD        methylergonovine (METHERGINE) injection 200 mcg  200 mcg IntraMUSCular PRN Justin Gaspar MD        carboprost (HEMABATE) injection 250 mcg  250 mcg IntraMUSCular PRN Justin Gaspar MD        miSOPROStol (CYTOTEC) tablet 1,000 mcg  1,000 mcg Rectal PRN Justin Gaspar MD        acetaminophen (TYLENOL) tablet 650 mg  650 mg Oral Q4H PRN Justin Gaspar MD        ibuprofen (ADVIL;MOTRIN) tablet 800 mg  800 mg Oral Q8H PRN Justin Gaspar MD        morphine (PF) injection 2 mg  2 mg IntraVENous Q2H PRN Justin Gaspar MD        Or   Aure Alu morphine injection 4 mg  4 mg IntraVENous Q2H PRN Justin Gaspar MD       Aure Alu witch hazel-glycerin (TUCKS) pad   Topical PRN Justin Gaspar MD        benzocaine-menthol (DERMOPLAST) 20-0.5 % spray   Topical PRN Justin Gaspar MD         Facility-Administered Medications Ordered in Other Encounters   Medication Dose Route Frequency Provider Last Rate Last Admin    lidocaine 1 % injection   IntraDERmal PRN Keyonna Cortney   3 mL at 09/16/22 0122    lidocaine-EPINEPHrine 2 percent-1:[id number] injection   Epidural PRN Keyonna Cortney   2 mL at 09/16/22 0139    fentaNYL 750 mcg, ropivacaine 0.1% in sodium chloride 0.9% 265mL (OB) epidural   Epidural PRN Keyonna Cortney 18 mL/hr at 09/16/22 0142 18 mL/hr at 09/16/22 0142       Allergies: Allergies   Allergen Reactions    Cefdinir Rash       Problem List:    Patient Active Problem List   Diagnosis Code    Overweight (BMI 25.0-29. 9) E66.3    Left lower quadrant pain R10.32    Left ovarian cyst N83.202    Ovarian torsion N83.519    S/P laparoscopy Z98.890    S/P exploratory laparotomy Z98.890    Torsion of left ovary N83.512    High blood pressure affecting pregnancy in third trimester, antepartum O16.3    Encounter for induction of labor Z34.90       Past Medical History:        Diagnosis Date    Acne     Dr. Lainez Course.  Concussion 10/27/2015    Hypertension     GHTN G1    Obesity        Past Surgical History:        Procedure Laterality Date    ANTERIOR CRUCIATE LIGAMENT REPAIR  07/01/2012    right knee-Dr. Harris Macias    LAPAROSCOPY N/A 02/14/2021    OPERATIVE LAPAROSCOPY, REVERSAL OF LEFT OVARIAN TORSION performed by Garrick Evans MD at [facility] Left 04/06/2021    LEFT LAPAROSCOPIC OOPHORECTOMY performed by Mai Kayser, MD at [address] [place] [address]      age 5   [de-identified] WISDOM TOOTH EXTRACTION Bilateral 05/15/2015    Dr. Hima Spivey       Social History:    Social History     Tobacco Use    Smoking status: Former     Types: Cigarettes    Smokeless tobacco: Never   Substance Use Topics    Alcohol use:  No                                Counseling given: Not Answered      Vital Signs (Current):   Vitals:    09/15/22 2250 09/15/22 2320 09/16/22 0020 09/16/22 0051   BP: 103/61 103/60 117/60 118/72   Pulse: 77 74 67 82   Resp:  16 16    Temp:  36.7 °C (98.1 °F)     TempSrc:  Temporal     SpO2:  98% 97%                                               BP Readings from Last 3 Encounters:   09/16/22 118/72   09/10/22 128/76   07/07/22 118/76       NPO Status:                                                                                 BMI:   Wt Readings from Last 3 Encounters:   09/10/22 260 lb (117.9 kg)   07/07/22 236 lb (107 kg)   06/19/21 198 lb (89.8 kg)     There is no height or weight on file to calculate BMI.    CBC:   Lab Results   Component Value Date/Time    WBC 9.9 09/15/2022 02:30 PM    RBC 4.75 09/15/2022 02:30 PM    HGB 13.4 09/15/2022 02:30 PM    HCT 39.5 09/15/2022 02:30 PM    MCV 83.2 09/15/2022 02:30 PM    RDW 13.4 04/13/2016 07:24 AM     09/15/2022 02:30 PM       CMP:   Lab Results   Component Value Date/Time     09/10/2022 08:35 PM K 3.8 09/10/2022 08:35 PM    K 4.1 04/05/2021 09:54 PM     09/10/2022 08:35 PM    CO2 19 09/10/2022 08:35 PM    BUN 9 09/13/2022 12:01 PM    CREATININE 0.8 09/13/2022 12:01 PM    LABGLOM 87 09/13/2022 12:01 PM    GLUCOSE 86 09/10/2022 08:35 PM    PROT 6.1 09/10/2022 08:35 PM    CALCIUM 8.8 09/10/2022 08:35 PM    BILITOT 0.3 09/10/2022 08:35 PM    ALKPHOS 136 09/10/2022 08:35 PM    AST 20 09/13/2022 12:01 PM    ALT 14 09/13/2022 12:01 PM       POC Tests: No results for input(s): POCGLU, POCNA, POCK, POCCL, POCBUN, POCHEMO, POCHCT in the last 72 hours. Coags:   Lab Results   Component Value Date/Time    INR 1.07 04/13/2016 07:25 AM    APTT 30.8 04/13/2016 07:25 AM       HCG (If Applicable):   Lab Results   Component Value Date    PREGTESTUR NEGATIVE 06/21/2017    PREGSERUM NEGATIVE 04/04/2021        ABGs: No results found for: PHART, PO2ART, EOA7DTY, RII5HZW, BEART, X5UAKVDM     Type & Screen (If Applicable):  Lab Results   Component Value Date    LABRH POS 09/15/2022       Drug/Infectious Status (If Applicable):  Lab Results   Component Value Date/Time    HEPCAB Negative 05/04/2022 01:18 PM       COVID-19 Screening (If Applicable):   Lab Results   Component Value Date/Time    COVID19 NOT DETECTED 04/06/2021 08:00 AM           Anesthesia Evaluation  Patient summary reviewed and Nursing notes reviewed  Airway: Mallampati: II          Dental: normal exam         Pulmonary:Negative Pulmonary ROS and normal exam                               Cardiovascular:  Exercise tolerance: good (>4 METS),   (+) hypertension (gestational): mild,                   Neuro/Psych:   Negative Neuro/Psych ROS              GI/Hepatic/Renal: Neg GI/Hepatic/Renal ROS            Endo/Other: Negative Endo/Other ROS                    Abdominal:             Vascular: Other Findings:           Anesthesia Plan      epidural     ASA 2             Anesthetic plan and risks discussed with patient.       Plan discussed with CRNA.    Attending anesthesiologist reviewed and agrees with Preprocedure content                MARIA LUISA ESPINAL   9/16/2022

## 2022-09-16 NOTE — FLOWSHEET NOTE
Pt assisted to the bathroom, voided, small vaginal bleeding, tucks, spray, ice applied, gown changed.

## 2022-09-16 NOTE — FLOWSHEET NOTE
Dr Celina Blas at the desk updated on last vag exam, anterior lip cervix only on pts right side, still -1 station. Pt had epidural redose around 1000. Md is signing out to Dr Nicolette Abad.

## 2022-09-16 NOTE — FLOWSHEET NOTE
Updated Dr. Tom Thao regarding BP values, SVE, pt comfortable with epidural, FHTs, contraction pattern, pitocin titration. Continue POC.

## 2022-09-16 NOTE — FLOWSHEET NOTE
Dr Lori Waller at the desk updated on pt status, vag exam 8/90/-1, internal monitors placed, pt tolerated well, pt comfortable with epidural, fht's reactive, pitocin at 16 milliunits.

## 2022-09-16 NOTE — FLOWSHEET NOTE
Pt started pushing. Patient actively pushing. RN remains in continuous attendance at the bedside. Assessment & evaluation of fetal heart rate and contraction pattern ongoing by RN via continuous EFM.

## 2022-09-16 NOTE — PLAN OF CARE
Problem: Postpartum  Goal: Experiences normal postpartum course  Description:  Postpartum OB-Pregnancy care plan goal which identifies if the mother is experiencing a normal postpartum course  Outcome: Progressing  Note: Vital signs and assessments WNL. Goal: Establishment of infant feeding pattern  Description:  Postpartum OB-Pregnancy care plan goal which identifies if the mother is establishing a feeding pattern with their   Outcome: Progressing     Problem: Pain  Goal: Verbalizes/displays adequate comfort level or baseline comfort level  Outcome: Progressing  Flowsheets (Taken 2022 by Michael Doss RN)  Verbalizes/displays adequate comfort level or baseline comfort level:   Encourage patient to monitor pain and request assistance   Assess pain using appropriate pain scale   Administer analgesics based on type and severity of pain and evaluate response   Implement non-pharmacological measures as appropriate and evaluate response   Consider cultural and social influences on pain and pain management   Notify Licensed Independent Practitioner if interventions unsuccessful or patient reports new pain  Note: Pain controlled with po meds. Discussed ice for perineal pain and/or incisional pain or the use of warm blanket/heating pad for uterine cramps. Pt states her pain goal 4/10 has been met. Problem: Infection - Adult  Goal: Absence of infection during hospitalization  Outcome: Progressing  Flowsheets (Taken 2022 by Michael Doss RN)  Absence of infection during hospitalization:   Assess and monitor for signs and symptoms of infection   Monitor lab/diagnostic results   Monitor all insertion sites i.e., indwelling lines, tubes and drains   Administer medications as ordered   Instruct and encourage patient and family to use good hand hygiene technique  Note: Vital signs and assessments WNL. ;       Problem: Safety - Adult  Goal: Free from fall injury  Outcome: Progressing  Flowsheets (Taken 9/16/2022 0947 by Leisa Gudino RN)  Free From Fall Injury: Instruct family/caregiver on patient safety  Note: No falls   Care plan reviewed with patient and she contributes to goal setting and voices understanding of plan of care.

## 2022-09-16 NOTE — FLOWSHEET NOTE
Dr. Gilford Rowena paged unit. Updated that pt is currently getting epidural.  SVE about an hour ago was 4cm dilated and RN is going to recheck after epidural.  FHT's reactive. VSS. Continue with plan of care.

## 2022-09-16 NOTE — PLAN OF CARE
Problem: Vaginal Birth or  Section  Goal: Fetal and maternal status remain reassuring during the birth process  Description:  Birth OB-Pregnancy care plan goal which identifies if the fetal and maternal status remain reassuring during the birth process  Flowsheets (Taken 2022)  Fetal and Maternal Status Remain Reassuring During the Birth Process:   Monitor vital signs   Monitor uterine activity   Monitor fetal heart rate   Monitor labor progression (Vaginal delivery)     Problem: Infection - Adult  Goal: Absence of infection during hospitalization  Flowsheets (Taken 2022)  Absence of infection during hospitalization:   Assess and monitor for signs and symptoms of infection   Monitor lab/diagnostic results   Monitor all insertion sites i.e., indwelling lines, tubes and drains   Administer medications as ordered   Instruct and encourage patient and family to use good hand hygiene technique     Problem: Safety - Adult  Goal: Free from fall injury  Flowsheets (Taken 2022)  Free From Fall Injury: Instruct family/caregiver on patient safety     Problem: Discharge Planning  Goal: Discharge to home or other facility with appropriate resources  Flowsheets (Taken 2022)  Discharge to home or other facility with appropriate resources:   Identify barriers to discharge with patient and caregiver   Identify discharge learning needs (meds, wound care, etc)     Problem: Pain  Goal: Verbalizes/displays adequate comfort level or baseline comfort level  Flowsheets (Taken 2022)  Verbalizes/displays adequate comfort level or baseline comfort level:   Encourage patient to monitor pain and request assistance   Assess pain using appropriate pain scale   Administer analgesics based on type and severity of pain and evaluate response   Implement non-pharmacological measures as appropriate and evaluate response   Consider cultural and social influences on pain and pain management Notify Licensed Independent Practitioner if interventions unsuccessful or patient reports new pain  Care plan reviewed with patient and s/o. Patient and s/o verbalize understanding of the plan of care and contribute to goal setting.

## 2022-09-16 NOTE — PROGRESS NOTES
Called for delivery. On arrival at bedside, pt complete and +3-+4 with pushing. Pushing adequately with contractions. FHT: 135, mod gaurang, +accels, no decels toco: q3-4 min. Category I tracing. Reactive. Anticipate .      Lisa Aparicio MD  5:06 PM  2022

## 2022-09-16 NOTE — FLOWSHEET NOTE
Dr Andre Sellers on the phone updated on pt status, has been pushing for 30 min, able to see baby's head without doing vag exam, some Md marisela will be in OR when needed.

## 2022-09-16 NOTE — DISCHARGE INSTRUCTIONS
sitz bath at most drugstores. It will fit on your toilet. You can also use your bathtub. Diet    Eat a well balanced, healthy diet to help your recover from childbirth. If you are breastfeeding, you will need additional calories each day. You may also be advised to avoid certain foods. Some women experience constipation after childbirth. To avoid this problem:   Drink plenty of fluids. Eat foods high in fiber, such as:   Whole grain cereals and breads   Fruits and vegetables   Legumes (eg, beans, lentils)         Physical Activity    Labor and delivery is tiring. Rest when you can to regain your energy. Your doctor will encourage you to exercise by walking. Ask your doctor when you will be able to return to more strenuous exercise. Your doctor may suggest you do Kegel exercises to strengthen your pelvic muscles. To do these tighten your muscles as if you were stopping your urine flow. Hold for a few seconds and then relax. Do these throughout the day. Medications    Breastfeeding can cause your uterus to contract. If painful, your doctor may recommend a pain reliever. If you are breastfeeding, it's important to ask your doctor about taking medications. You may receive from the hospital a list of medications to avoid. Once your doctor has approved your medications, it's important to: Take your medication as directed. Do not change the amount or the schedule. Do not stop taking them without talking to your doctor. Do not share them. Know what the results and side effects may be. Report bothersome side effects to your doctor. Some drugs can be dangerous when mixed. Talk to a doctor or pharmacist if you are taking more than one drug. This includes over-the-counter medication and herb or dietary supplements. Plan ahead for refills so you don't run out.    Ibuprofen over the counter works well for uterine contractions                                   Lifestyle Changes    Having a baby requires significant lifestyle changes. You and your doctor will plan lifestyle changes that will help you recover. Some things to keep in mind include: It is important to get enough rest so you can recover. Try sleeping when the baby sleeps. Ask your doctor when you can resume sexual relations. If you have not done so already, talk to your doctor about birth control options. If you are breastfeeding, consider a breast pump. Call your obstetrician and/or pediatrician for any questions that arise. Understand the changes your body is going through as it recovers from childbirth:   Hot and cold flashes as your body adjusts to new hormone and blood flow levels   Urinary or fecal incontinence due to stretched muscles   After pains from uterine contractions as the uterus shrinks   Vaginal bleeding (called lochia), which is heavier than your period (generally stops within two months)   Baby blues, feelings of irritability, sadness, crying, or anxiety. Postpartum depression is more severe, occurring in 10%-20% of new moms. If you experience such symptoms, contact your doctor. Consider joining a support group for new mothers. You can get encouragement and learn parenting strategies. Follow-up   Schedule a follow-up appointment as directed by your doctor. Call Your Doctor If Any of the Following Occurs   It is important for you to monitor your recovery once you leave the hospital. That way, you can alert your doctor to any problems immediately.  If any of the following occurs, call your doctor:   Signs of infection, including fever and chills    Increased bleeding: soaking more than one sanitary pad an hour    Wounds that become red, swollen or drain pus    Vaginal discharge that smells foul    New pain, swelling, or tenderness in your legs    Pain that you can't control with the medications you've been given    Pain, burning, urgency or frequency of urination, or persistent bleeding in the urine    Cough, shortness of breath, or chest pain    Depression, suicidal thoughts, or feelings of harming your baby    Breasts that are hot, red and accompanied by fever    Any cracking or bleeding from the nipple or areola (the dark-colored area of the breast)    Headache no resolved with tylenol, vision changes, uncontrolled nausea or vomiting. In case of an emergency, call 911 immediately. Postpartum Discharge Instructions      DIET  Eat a well balanced diet focusing on foods high in fiber and protein. Drink plenty of fluids especially water. To avoid constipation you may take a mild stool softener as recommended by your doctor . ACTIVITY  Gradually increase your activity. Resume exercise regimen only after advise by your doctor. Avoid lifting anything heavier than your baby or a gallon of milk for 2 weeks. Avoid driving until your doctor or midwife has given their approval.    Talia Phillips slowly from a lying to sitting and then a standing position. Climb stairs one at a time. Use caution when carrying your baby up and down the stairs. NO SEXUAL Activity for 4-6 weeks or until advised by your doctor; Nothing in vagina: intercourse, tampons, or douching. Be prepared to discuss family planning at your follow-up OB visit. You may feel tired or have a lack of energy. You may continue your prenatal vitamin to replenish nutrients post delivery. Nap when baby naps to catch up on sleep. EMOTIONS  You may feed henley, sad, teary, & overwhelmed. Contact your OB provider if you feel you may be showing signs of postpartum depression, or have thoughts of harming yourself or your infant. If infant will not stop crying, contact another adult for help or place infant in their crib on their back and take a break. NEVER shake your infant. BLEEDING  Vaginal bleeding will decrease in amount over the next few weeks.     You will notice that as your activity increases, your flow may increase. This is your body's way of telling you, you need take things easier and rest more often. Call your OB/ER if you are saturating more than one maxi pad in an hour & resting does help. BREAST CARE  Take medications as recommended by your doctor or midwife for pain    If you develop a warm, red, tender area on your breast or develop a fever contact your OB provider. For breastfeeding moms:  If you become engorged, feeding may be more difficult or painful for 1-2 days. You may find it helpful to hand express some milk so that the infant can latch on more easily. While breastfeeding, continue to take your prenatal vitamins as directed by your doctor or midwife. Refer to the breastfeeding booklet in the  folder/binder for more information. For any questions or concerns contact a Lactation Consultant. Leave a message and your call will be returned. For NON-breastfeeding moms:  You may apply ice packs to your breasts over you bra for twenty minutes at a time for comfort. Avoid stimulation to your breasts, when showering allow the water to strike your back not your breasts. Wear a good fitting bra until your milk dries, such as a sports bra. INCISIONAL CARE / JAKE CARE  Clean your incision in the shower with mild soap. After shower pat the incision are dry and allow the area open to air. If used, Steri-strips should be removed by 2 weeks. If used, Staples should be removed by the OB office by 1 week. If used/ordered, an abdominal binder may provide support for your incision. Use the jake-bottle after toileting until bleeding stops. Cleanse your perineum from front to back    If used, stitches will dissolve in 4-6 weeks. You may use a sitz bath or soak in a clean tub as needed for comfort. Kegel exercises will help restore bladder control. SWELLING  Try to keep your legs elevated when you are sitting.      When lying down keep your legs elevated. When wearing stocking or socks, make sure they are not too tight. WHEN TO CALL THE DOCTOR  If you have a temp of 100.6 or more. If your bleeding has increased and you are saturating a pad in an hour. Your abdomen is tender to touch. You are passing blood clots bigger than the size of a lemon. If you are experiencing extreme weakness or dizziness. If you are having flu-like symptoms such as achy muscles or joints. There is a foul smell or a green color to your vaginal bleeding. If you have pain that cannot be relieved. You have persistent burning with urination or frequency. Call if you have concerns about your well-being. You are unable to sleep, eat, or are having thoughts of harming yourself or your baby. You have swelling, bleeding, drainage, foul odor, redness, or warmth in/around your incision or stitches. You have a red, warm, tender area in you calf. Please contact your OB physician right way. Please refer to your \"Guide for New Mothers \" Farris Meckel for  further information of caring for yourself & your baby. Follow-up with your [facility] doctor as specified. [facility]- Dr. Moira Messer, [place], [place], & Bqhdjtqsq-[phone]  MAY Vidal- [phone]  [facility] for [address] [place] [address] 90 - [phone]  [place] for [facility]- Dr. Cox Persons [phone]    For Breastfeeding moms, you can contact our lactation specialists with  any problems or questions you may have. Contact our Lactation Consultants at [phone]. Please feel free to leave a message and they will return your call.

## 2022-09-16 NOTE — L&D DELIVERY NOTE
Department of Obstetrics and Gynecology   Vaginal Delivery Note at [facility]      Date of Delivery:  2022    Procedure:  Spontaneous vaginal delivery  and  Repair second degree spontaneous laceration    Surgeon:  Ingrid Ingram MD    Anesthesia:  epidural anesthesia    Estimated blood loss:  350ml    Cord blood sent Yes    Complications:  gestational hypertension    Condition:  infant stable to general nursery and mother stable    Details of Procedure: The patient is a 24 y.o.  female at 38w0d  who was admitted for [facility]. She received the following interventions: ARBOW and IV Pitocin induction and mosher bulb cervical ripening. The patient progressed well,did receive an epidural, became complete and started to push. Patient progressed well and the fetal head was delivered over an intact perineum, and the rest of the infant delivered atraumatically, placed on mother abdomen. The cord was clamped and cut after 1 minute and the cyring infant placed skin to skin with the waiting nurse at bedside for evaluation. Cord blood and cord segment were collected. The delivery of the placenta was spontaneous. The perineum and vagina were explored and a second degree laceration was repaired in standard fashion with 3-0 vicyrl. A vaginal sweep was completed and two sharps were placed in the proper container. Sponge count correct. Infant Wt: pending    APGARS:   pending  [place] Escudero [[id number]]      Apgars    Living status: Living  Apgars   1 Minute:  5 Minute:  10 Minute 15 Minute 20 Minute   Skin Color:        Heart Rate:        Reflex Irritability:        Muscle Tone:        Respiratory Effort:         Total:                                Electronically signed by Ingrid Ingram MD on 2022 at 5:07 PM

## 2022-09-16 NOTE — FLOWSHEET NOTE
Dr Benny Pardo text to please call, Md returned call notified of pts recovery bps and increased hr, pt is asymptomatic, bleeding scant, admission hemoglobin reviewed with Md. Pt in the wheelchair ready to be transferred to mom baby.

## 2022-09-16 NOTE — ANESTHESIA PROCEDURE NOTES
Epidural Block    Patient location during procedure: OB  Start time: 9/16/2022 1:19 AM  End time: 9/16/2022 1:35 AM  Reason for block: labor epidural  Staffing  Performed: resident/CRNA   Anesthesiologist: Dandy Mchugh DO  Resident/CRNA: Pierre Barr  Epidural  Patient position: sitting  Prep: ChloraPrep  Patient monitoring: continuous pulse ox and frequent blood pressure checks  Approach: midline  Location: L4-5  Injection technique: DREW saline  Guidance: paresthesia technique  Provider prep: mask and sterile gloves  Needle  Needle type: Tuohy   Needle gauge: 18 G  Needle length: 3.5 in  Needle insertion depth: 8 cm  Catheter type: side hole  Catheter size: 20 G  Catheter at skin depth: 14 cm  Test dose: negative  Kit: [id number]  Lot number: [id number]  Expiration date: 9/30/2023Catheter Secured: tegaderm and tape  Assessment  Hemodynamics: stable  Attempts: 1  Outcomes: uncomplicated and patient tolerated procedure well  Preanesthetic Checklist  Completed: patient identified, IV checked, site marked, risks and benefits discussed, surgical/procedural consents, equipment checked, pre-op evaluation, timeout performed, anesthesia consent given, oxygen available, monitors applied/VS acknowledged, fire risk safety assessment completed and verbalized and blood product R/B/A discussed and consented

## 2022-09-17 PROCEDURE — 6370000000 HC RX 637 (ALT 250 FOR IP): Performed by: OBSTETRICS & GYNECOLOGY

## 2022-09-17 PROCEDURE — 1220000000 HC SEMI PRIVATE OB R&B

## 2022-09-17 RX ADMIN — IBUPROFEN 800 MG: 800 TABLET, FILM COATED ORAL at 09:31

## 2022-09-17 RX ADMIN — IBUPROFEN 800 MG: 800 TABLET, FILM COATED ORAL at 17:47

## 2022-09-17 RX ADMIN — DOCUSATE SODIUM 100 MG: 100 CAPSULE, LIQUID FILLED ORAL at 09:31

## 2022-09-17 RX ADMIN — IBUPROFEN 800 MG: 800 TABLET, FILM COATED ORAL at 00:01

## 2022-09-17 ASSESSMENT — PAIN DESCRIPTION - LOCATION
LOCATION: PERINEUM
LOCATION: ABDOMEN

## 2022-09-17 ASSESSMENT — PAIN SCALES - GENERAL
PAINLEVEL_OUTOF10: 4
PAINLEVEL_OUTOF10: 3
PAINLEVEL_OUTOF10: 2
PAINLEVEL_OUTOF10: 2

## 2022-09-17 ASSESSMENT — PAIN - FUNCTIONAL ASSESSMENT: PAIN_FUNCTIONAL_ASSESSMENT: ACTIVITIES ARE NOT PREVENTED

## 2022-09-17 ASSESSMENT — PAIN DESCRIPTION - DESCRIPTORS: DESCRIPTORS: CRAMPING

## 2022-09-17 NOTE — PROGRESS NOTES
Department of Obstetrics and Gynecology  Labor and Delivery  Attending Post Partum Progress Note    PPD #1    SUBJECTIVE: Feeling well  Breastfeeding going well at this time. Ambulating well. Pain well controlled with PO medications, reports no cramping. Reports moderate period amount of vaginal bleeding. OBJECTIVE:     Vitals:  BP (!) 109/59   Pulse 95   Temp 98.4 °F (36.9 °C) (Oral)   Resp 18   SpO2 97%   Breastfeeding Unknown     Uterus:  normal size, well involuted, firm, non-tender    DATA:     No results found for this or any previous visit (from the past 24 hour(s)). ASSESSMENT & PLAN:  Doing well. Plan discharge on day 2.     Electronically signed by Maxx Galvan MD on 9/17/2022 at 8:31 AM

## 2022-09-17 NOTE — PROGRESS NOTES
[address] [id number]                                NON STRESS TEST    PATIENT NAME: Jeancarlos Al                     :        1995  MED REC NO:   [id number]                           ROOM:       0006  ACCOUNT NO:   [de-identified]                           ADMIT DATE: 09/10/2022  PROVIDER:     Ana Khan M.D.    Sherren Marines:  09/10/2022    INDICATIONS:  The patient is a 30-year-old, , presented at 40 and 1  with complaints of elevated blood pressure. She had one elevated blood  pressure on admission 150/100. The reminder were 130s/80-90s. She  underwent laboratory workup which was negative for preeclampsia. Fetal  heart tones were reactive, 150 with moderate variability and  accelerations. No decelerations. She was discharged home, instructed  to follow up as scheduled in the office. Precautions were reviewed.         Raffy Sterling M.D.    D: 2022 11:00:36       T: 2022 6:35:05     ROMI/YANIQUE  Job#: [id number]     Doc#: [id number]    CC:

## 2022-09-17 NOTE — PLAN OF CARE
Problem: Postpartum  Goal: Experiences normal postpartum course  Description:  Postpartum OB-Pregnancy care plan goal which identifies if the mother is experiencing a normal postpartum course  2022 by Katie Thibodeaux RN  Outcome: Progressing  Flowsheets (Taken 2022)  Experiences Normal Postpartum Course:   Monitor maternal vital signs   Assess uterine involution     Problem: Postpartum  Goal: Establishment of infant feeding pattern  Description:  Postpartum OB-Pregnancy care plan goal which identifies if the mother is establishing a feeding pattern with their   2022 by Kaite Thibodeaux RN  Outcome: Progressing  Flowsheets (Taken 2022)  Establishment of Infant Feeding Pattern: Assess breast/bottle feeding     Problem: Pain  Goal: Verbalizes/displays adequate comfort level or baseline comfort level  2022 by Katie Thibodeaux RN  Outcome: Progressing  [address] (Taken 2022)  Verbalizes/displays adequate comfort level or baseline comfort level:   Encourage patient to monitor pain and request assistance   Assess pain using appropriate pain scale     Problem: Infection - Adult  Goal: Absence of infection during hospitalization  2022 by Katie Thibodeaux RN  Outcome: Progressing  Flowsheets (Taken 2022)  Absence of infection during hospitalization:   Assess and monitor for signs and symptoms of infection   Monitor lab/diagnostic results     Problem: Safety - Adult  Goal: Free from fall injury  2022 by Katie Thibodeaux RN  Outcome: Debbie Simpson (Taken 2022)  Free From Fall Injury: Instruct family/caregiver on patient safety     Care plan reviewed with patient. Patient verbalizes understanding of the plan of care and contribute to goal setting.

## 2022-09-17 NOTE — PLAN OF CARE
Care plan reviewed with patient and she contributes to goal setting and voices understanding of plan of care.    Problem: Postpartum  Goal: Experiences normal postpartum course  Description:  Postpartum OB-Pregnancy care plan goal which identifies if the mother is experiencing a normal postpartum course  2022 1144 by Andrew Gilbert RN  Outcome: Progressing  Flowsheets (Taken 2022)  Experiences Normal Postpartum Course:   Monitor maternal vital signs   Assess uterine involution  Note: See flowsheet     Problem: Postpartum  Goal: Establishment of infant feeding pattern  Description:  Postpartum OB-Pregnancy care plan goal which identifies if the mother is establishing a feeding pattern with their   2022 1144 by Andrew Gilbert RN  Outcome: Progressing  Flowsheets (Taken 2022)  Establishment of Infant Feeding Pattern:   Assess breast/bottle feeding   Refer to lactation as needed  Note: See flowsheet     Problem: Pain  Goal: Verbalizes/displays adequate comfort level or baseline comfort level  2022 1144 by Andrew Gilbert RN  Outcome: Progressing  Flowsheets (Taken 2022)  Verbalizes/displays adequate comfort level or baseline comfort level: Encourage patient to monitor pain and request assistance  Note: Content with tylenol and motrin, pain goal of 5 met     Problem: Infection - Adult  Goal: Absence of infection during hospitalization  2022 1144 by Andrew Gilbert RN  Outcome: Progressing  Flowsheets (Taken 2022)  Absence of infection during hospitalization:   Assess and monitor for signs and symptoms of infection   Monitor lab/diagnostic results  Note: Afebrile, stable VS     Problem: Safety - Adult  Goal: Free from fall injury  2022 1144 by Andrew Gilbert RN  Outcome: Progressing  Flowsheets (Taken 2022)  Free From Fall Injury: Instruct family/caregiver on patient safety  Note: Room free of clutter

## 2022-09-17 NOTE — FLOWSHEET NOTE
Patient up to void large amount, bleeding small, Firm U-1. Tolerated well, back to bed call light within reach.

## 2022-09-17 NOTE — ANESTHESIA POSTPROCEDURE EVALUATION
Department of Anesthesiology  Postprocedure Note    Patient: Valentino Parsons  MRN: [id number]  YOB: 1995  Date of evaluation: 9/17/2022      Procedure Summary     Date: 09/16/22 Room / Location:     Anesthesia Start: 0111 Anesthesia Stop: 3430    Procedure: Labor Analgesia Diagnosis:     Scheduled Providers:  Responsible Provider: Viktoriya Mera DO    Anesthesia Type: epidural ASA Status: 2          Anesthesia Type: No value filed.     Elizabeth Phase I: Elizabeth Score: 9    Elizabeth Phase II: Elizabeth Score: 10      Anesthesia Post Evaluation    Patient location during evaluation: floor  Patient participation: complete - patient participated  Level of consciousness: awake  Airway patency: patent  Nausea & Vomiting: no vomiting and no nausea  Complications: no  Cardiovascular status: hemodynamically stable  Respiratory status: acceptable  Hydration status: stable

## 2022-09-18 VITALS
OXYGEN SATURATION: 97 % | DIASTOLIC BLOOD PRESSURE: 66 MMHG | RESPIRATION RATE: 18 BRPM | SYSTOLIC BLOOD PRESSURE: 118 MMHG | TEMPERATURE: 97.9 F | HEART RATE: 76 BPM

## 2022-09-18 PROCEDURE — 6370000000 HC RX 637 (ALT 250 FOR IP): Performed by: OBSTETRICS & GYNECOLOGY

## 2022-09-18 RX ORDER — IBUPROFEN 600 MG/1
600 TABLET ORAL EVERY 6 HOURS PRN
Qty: 30 TABLET | Refills: 1 | COMMUNITY
Start: 2022-09-18

## 2022-09-18 RX ORDER — ACETAMINOPHEN 325 MG/1
650 TABLET ORAL EVERY 6 HOURS PRN
Qty: 120 TABLET | Refills: 3 | COMMUNITY
Start: 2022-09-18

## 2022-09-18 RX ADMIN — ACETAMINOPHEN 1000 MG: 500 TABLET ORAL at 09:13

## 2022-09-18 RX ADMIN — DOCUSATE SODIUM 100 MG: 100 CAPSULE, LIQUID FILLED ORAL at 09:13

## 2022-09-18 RX ADMIN — IBUPROFEN 800 MG: 800 TABLET, FILM COATED ORAL at 04:06

## 2022-09-18 ASSESSMENT — PAIN SCALES - GENERAL
PAINLEVEL_OUTOF10: 0
PAINLEVEL_OUTOF10: 3

## 2022-09-18 ASSESSMENT — PAIN DESCRIPTION - DESCRIPTORS
DESCRIPTORS: CRAMPING
DESCRIPTORS: CRAMPING

## 2022-09-18 ASSESSMENT — PAIN DESCRIPTION - LOCATION
LOCATION: ABDOMEN
LOCATION: ABDOMEN

## 2022-09-18 NOTE — PLAN OF CARE
Problem: Postpartum  Goal: Experiences normal postpartum course  Description:  Postpartum OB-Pregnancy care plan goal which identifies if the mother is experiencing a normal postpartum course  2022 by Dontae Hines RN  Outcome: Progressing  Flowsheets (Taken 2022)  Experiences Normal Postpartum Course:   Monitor maternal vital signs   Assess uterine involution     Problem: Postpartum  Goal: Establishment of infant feeding pattern  Description:  Postpartum OB-Pregnancy care plan goal which identifies if the mother is establishing a feeding pattern with their   2022 by Dontae Hines RN  Outcome: Progressing  Flowsheets (Taken 2022)  Establishment of Infant Feeding Pattern: Assess breast/bottle feeding     Problem: Pain  Goal: Verbalizes/displays adequate comfort level or baseline comfort level  2022 by Dontae Hines RN  Outcome: Progressing  [address] (Taken 2022)  Verbalizes/displays adequate comfort level or baseline comfort level:   Encourage patient to monitor pain and request assistance   Assess pain using appropriate pain scale     Problem: Infection - Adult  Goal: Absence of infection during hospitalization  2022 by Dontae Hines RN  Outcome: Progressing  [address] (Taken 2022)  Absence of infection during hospitalization:   Assess and monitor for signs and symptoms of infection   Monitor lab/diagnostic results     Problem: Safety - Adult  Goal: Free from fall injury  2022 by Dontae Hines RN  Outcome: Progressing  [address] (Taken 2022)  Free From Fall Injury: Instruct family/caregiver on patient safety     Problem: Infection - Adult  Goal: Absence of infection during hospitalization  Recent Flowsheet Documentation  Taken 2022 by Dontae Hines RN  Absence of infection during hospitalization:   Assess and monitor for signs and symptoms of infection   Monitor lab/diagnostic results  Taken 9/17/2022 0931 by Ruddy Valles RN  Absence of infection during hospitalization:   Assess and monitor for signs and symptoms of infection   Monitor lab/diagnostic results     Problem: Safety - Adult  Goal: Free from fall injury  Recent Flowsheet Documentation  Taken 9/17/2022 2103 by Peyton Castro RN  Free From Fall Injury: Instruct family/caregiver on patient safety  Taken 9/17/2022 0931 by Ruddy Valles RN  Free From Fall Injury: Instruct family/caregiver on patient safety     Problem: Discharge Planning  Goal: Discharge to home or other facility with appropriate resources  Recent Flowsheet Documentation  Taken 9/17/2022 2103 by Peyton Castro RN  Discharge to home or other facility with appropriate resources: Identify barriers to discharge with patient and caregiver  Taken 9/17/2022 0931 by Ruddy Valles RN  Discharge to home or other facility with appropriate resources: Identify barriers to discharge with patient and caregiver     Problem: Pain  Goal: Verbalizes/displays adequate comfort level or baseline comfort level  Recent Flowsheet Documentation  Taken 9/17/2022 2103 by Peyton Castro RN  Verbalizes/displays adequate comfort level or baseline comfort level:   Encourage patient to monitor pain and request assistance   Assess pain using appropriate pain scale  Taken 9/17/2022 0931 by Ruddy Valles RN  Verbalizes/displays adequate comfort level or baseline comfort level: Encourage patient to monitor pain and request assistance     Care plan reviewed with patient. Patient verbalizes understanding of the plan of care and contribute to goal setting.

## 2022-09-18 NOTE — PROGRESS NOTES
Department of Obstetrics and Gynecology  Progress Note      S: doing well. No complaints. Lochia appropriate. Denies cp, sob, ct. Denies headache, or vision changes. O:   Vitals:    22 0409   BP:    Pulse:    Resp: 16   Temp: 98.1 °F (36.7 °C)   SpO2:    -130/60-70    Gen: no acute distress   Resp: breathing unlabored   Abd: soft, nondistended, fundus firm below umbilicus    A: 24 y.o.   PPD#1 s/p , GHTN doing well.    P:   1. B POS   2. Con't postpartum care   3. d/c home today-f/u in 1 wk for BP Check    John Odell MD  8:35 AM  2022

## 2022-09-18 NOTE — PLAN OF CARE
Care plan reviewed with patient and she contributes to goal setting and voices understanding of plan of care.    Problem: Postpartum  Goal: Experiences normal postpartum course  Description:  Postpartum OB-Pregnancy care plan goal which identifies if the mother is experiencing a normal postpartum course  2022 1306 by Birgit Haider RN  Outcome: Completed  Flowsheets (Taken 2022)  Experiences Normal Postpartum Course:   Monitor maternal vital signs   Assess uterine involution  Note: See flowsheet     Problem: Postpartum  Goal: Establishment of infant feeding pattern  Description:  Postpartum OB-Pregnancy care plan goal which identifies if the mother is establishing a feeding pattern with their   2022 1306 by Birgit Haider RN  Outcome: Completed  Flowsheets (Taken 2022)  Establishment of Infant Feeding Pattern:   Assess breast/bottle feeding   Refer to lactation as needed  Note: Fed on demand breast and bottle     Problem: Pain  Goal: Verbalizes/displays adequate comfort level or baseline comfort level  2022 130 by Birgit Haider RN  Outcome: Completed  Flowsheets (Taken 2022)  Verbalizes/displays adequate comfort level or baseline comfort level: Encourage patient to monitor pain and request assistance  Note: Taking motrin and tylenol with relief, pain goal met     Problem: Infection - Adult  Goal: Absence of infection during hospitalization  2022 130 by Birgit Haider RN  Outcome: Completed  Flowsheets (Taken 2022)  Absence of infection during hospitalization: Administer medications as ordered  Note: See labs and VS     Problem: Safety - Adult  Goal: Free from fall injury  2022 1306 by Birgit Haider RN  Outcome: Completed  Flowsheets (Taken 2022)  Free From Fall Injury: Instruct family/caregiver on patient safety  Note: Room free of clutter

## 2022-09-18 NOTE — FLOWSHEET NOTE
Post birth warning signs education paper given and reviewed, teaching complete. [place] postpartum depression screening discussed with patient, instructed to contact her healthcare provider if her score is > 10. Patient voiced understanding. Mother's blood type is B+. Baby's blood type is not tested. Mother did NOT receive Rhogam .      See AVS. Pt verbalized understanding of medications. Postpartum  teaching completed and forms signed by patient. Copy witnessed by RN and given to patient. Patient verbalized understanding of all teaching points. Patient plans to follow-up with [facility] Provider as instructed. Patient verbalizes understanding of discharge instructions and denies further questions. ID bands checked. Patient discharged in stable condition accompanied by family/guardian. Discharged in wheelchair, holding baby in arms. Infant has roomed in with mother this shift  Benefits of rooming in discussed.

## 2022-09-24 ENCOUNTER — HOSPITAL ENCOUNTER (EMERGENCY)
Age: 27
Discharge: HOME | End: 2022-09-24
Payer: COMMERCIAL

## 2022-09-24 VITALS
TEMPERATURE: 98 F | OXYGEN SATURATION: 97 % | RESPIRATION RATE: 18 BRPM | DIASTOLIC BLOOD PRESSURE: 74 MMHG | HEART RATE: 122 BPM | BODY MASS INDEX: 35.44 KG/M2 | WEIGHT: 240 LBS | SYSTOLIC BLOOD PRESSURE: 115 MMHG

## 2022-09-24 DIAGNOSIS — N61.0 MASTITIS WITHOUT ABSCESS: Primary | ICD-10-CM

## 2022-09-24 PROCEDURE — 99213 OFFICE O/P EST LOW 20 MIN: CPT

## 2022-09-24 PROCEDURE — G0463 HOSPITAL OUTPT CLINIC VISIT: HCPCS

## 2022-09-24 PROCEDURE — 99213 OFFICE O/P EST LOW 20 MIN: CPT | Performed by: NURSE PRACTITIONER

## 2022-09-24 RX ORDER — CLINDAMYCIN HYDROCHLORIDE 300 MG/1
300 CAPSULE ORAL 3 TIMES DAILY
Qty: 30 CAPSULE | Refills: 0 | Status: SHIPPED | OUTPATIENT
Start: 2022-09-24 | End: 2022-10-04

## 2022-09-24 ASSESSMENT — ENCOUNTER SYMPTOMS
SORE THROAT: 0
COUGH: 0
RHINORRHEA: 0
TROUBLE SWALLOWING: 0
SHORTNESS OF BREATH: 0
VOMITING: 0
EYE REDNESS: 0
DIARRHEA: 0
NAUSEA: 0
EYE DISCHARGE: 0

## 2022-09-24 ASSESSMENT — PAIN SCALES - GENERAL: PAINLEVEL_OUTOF10: 3

## 2022-09-24 ASSESSMENT — PAIN DESCRIPTION - FREQUENCY: FREQUENCY: CONTINUOUS

## 2022-09-24 ASSESSMENT — PAIN - FUNCTIONAL ASSESSMENT
PAIN_FUNCTIONAL_ASSESSMENT: PREVENTS OR INTERFERES SOME ACTIVE ACTIVITIES AND ADLS
PAIN_FUNCTIONAL_ASSESSMENT: 0-10

## 2022-09-24 ASSESSMENT — PAIN DESCRIPTION - ORIENTATION: ORIENTATION: RIGHT;LEFT

## 2022-09-24 ASSESSMENT — PAIN DESCRIPTION - LOCATION: LOCATION: BREAST

## 2022-09-24 ASSESSMENT — PAIN DESCRIPTION - DESCRIPTORS: DESCRIPTORS: SHARP

## 2022-09-24 NOTE — ED TRIAGE NOTES
Patient to room with c/o bilateral breast pain beginning yesterday. States increased \"hardening\" of breast. Denies redness. Continues breast feeding x 8 days.

## 2022-12-01 ENCOUNTER — NURSE ONLY (OUTPATIENT)
Dept: LAB | Age: 27
End: 2022-12-01

## 2022-12-01 DIAGNOSIS — Z13.220 LIPID SCREENING: ICD-10-CM

## 2022-12-01 DIAGNOSIS — Z13.220 LIPID SCREENING: Primary | ICD-10-CM

## 2022-12-01 DIAGNOSIS — Z13.1 DIABETES MELLITUS SCREENING: ICD-10-CM

## 2022-12-01 LAB
CHOLESTEROL, TOTAL: 206 MG/DL (ref 100–199)
GLUCOSE FASTING: 94 MG/DL (ref 70–108)
HDLC SERPL-MCNC: 34 MG/DL
LDL CHOLESTEROL CALCULATED: 132 MG/DL
TRIGL SERPL-MCNC: 198 MG/DL (ref 0–199)

## 2023-07-12 SDOH — ECONOMIC STABILITY: FOOD INSECURITY: WITHIN THE PAST 12 MONTHS, YOU WORRIED THAT YOUR FOOD WOULD RUN OUT BEFORE YOU GOT MONEY TO BUY MORE.: NEVER TRUE

## 2023-07-12 SDOH — ECONOMIC STABILITY: TRANSPORTATION INSECURITY
IN THE PAST 12 MONTHS, HAS LACK OF TRANSPORTATION KEPT YOU FROM MEETINGS, WORK, OR FROM GETTING THINGS NEEDED FOR DAILY LIVING?: NO

## 2023-07-12 SDOH — ECONOMIC STABILITY: INCOME INSECURITY: HOW HARD IS IT FOR YOU TO PAY FOR THE VERY BASICS LIKE FOOD, HOUSING, MEDICAL CARE, AND HEATING?: NOT VERY HARD

## 2023-07-12 SDOH — ECONOMIC STABILITY: FOOD INSECURITY: WITHIN THE PAST 12 MONTHS, THE FOOD YOU BOUGHT JUST DIDN'T LAST AND YOU DIDN'T HAVE MONEY TO GET MORE.: NEVER TRUE

## 2023-07-12 SDOH — ECONOMIC STABILITY: HOUSING INSECURITY
IN THE LAST 12 MONTHS, WAS THERE A TIME WHEN YOU DID NOT HAVE A STEADY PLACE TO SLEEP OR SLEPT IN A SHELTER (INCLUDING NOW)?: NO

## 2023-07-12 ASSESSMENT — PATIENT HEALTH QUESTIONNAIRE - PHQ9
1. LITTLE INTEREST OR PLEASURE IN DOING THINGS: 0
SUM OF ALL RESPONSES TO PHQ QUESTIONS 1-9: 0
1. LITTLE INTEREST OR PLEASURE IN DOING THINGS: NOT AT ALL
2. FEELING DOWN, DEPRESSED OR HOPELESS: 0
SUM OF ALL RESPONSES TO PHQ9 QUESTIONS 1 & 2: 0
2. FEELING DOWN, DEPRESSED OR HOPELESS: NOT AT ALL
SUM OF ALL RESPONSES TO PHQ9 QUESTIONS 1 & 2: 0
SUM OF ALL RESPONSES TO PHQ QUESTIONS 1-9: 0

## 2023-07-13 ENCOUNTER — OFFICE VISIT (OUTPATIENT)
Dept: FAMILY MEDICINE CLINIC | Age: 28
End: 2023-07-13
Payer: COMMERCIAL

## 2023-07-13 VITALS
RESPIRATION RATE: 16 BRPM | TEMPERATURE: 97.7 F | SYSTOLIC BLOOD PRESSURE: 114 MMHG | HEIGHT: 70 IN | DIASTOLIC BLOOD PRESSURE: 62 MMHG | WEIGHT: 238.2 LBS | HEART RATE: 91 BPM | BODY MASS INDEX: 34.1 KG/M2 | OXYGEN SATURATION: 98 %

## 2023-07-13 DIAGNOSIS — Z00.00 WELL ADULT EXAM: Primary | ICD-10-CM

## 2023-07-13 DIAGNOSIS — Z00.00 LABORATORY EXAM ORDERED AS PART OF ROUTINE GENERAL MEDICAL EXAMINATION: ICD-10-CM

## 2023-07-13 DIAGNOSIS — E66.9 CLASS 1 OBESITY WITHOUT SERIOUS COMORBIDITY IN ADULT, UNSPECIFIED BMI, UNSPECIFIED OBESITY TYPE: ICD-10-CM

## 2023-07-13 DIAGNOSIS — Z13.220 LIPID SCREENING: ICD-10-CM

## 2023-07-13 DIAGNOSIS — Z13.1 SCREENING FOR DIABETES MELLITUS: ICD-10-CM

## 2023-07-13 PROCEDURE — 99395 PREV VISIT EST AGE 18-39: CPT | Performed by: FAMILY MEDICINE

## 2023-07-13 ASSESSMENT — ENCOUNTER SYMPTOMS
ANAL BLEEDING: 0
CONSTIPATION: 0
VOMITING: 0
ABDOMINAL PAIN: 0
DIARRHEA: 0
NAUSEA: 0
SHORTNESS OF BREATH: 0
CHEST TIGHTNESS: 0
BLOOD IN STOOL: 0

## 2023-07-13 NOTE — PATIENT INSTRUCTIONS
Continue to work on diet and exercise for optimal cardiovascular health   Check FLP, CMP, FREE T4/ TSH, and CBC after 12/1/2023. Continue current medicines  No refills needed at this time. Follow-up in 12 months. Preventive Health Topics:  Encouraged COVID VACCINE- pt declines. (updated 7/13/2023)  Encouraged FLU VACCINE after October 1st annually. (updated 7/13/2023)  Encouraged TETANUS SHOT (TdaP/ ADACEL/ BOOSTRIX) as pt currently 29 weeks pregnant- pt to check with OB/GYN for recommendations. PAP/ PELVIC management per Dr. Kelly Gene- to follow up 8/1/2023 for prenatal visit.  (updated 7/13/2023)

## 2023-09-14 ENCOUNTER — HOSPITAL ENCOUNTER (OUTPATIENT)
Age: 28
Discharge: HOME | End: 2023-09-14
Attending: OBSTETRICS & GYNECOLOGY | Admitting: OBSTETRICS & GYNECOLOGY
Payer: COMMERCIAL

## 2023-09-14 VITALS
TEMPERATURE: 97 F | BODY MASS INDEX: 34.36 KG/M2 | DIASTOLIC BLOOD PRESSURE: 76 MMHG | HEIGHT: 70 IN | RESPIRATION RATE: 18 BRPM | HEART RATE: 90 BPM | SYSTOLIC BLOOD PRESSURE: 126 MMHG | OXYGEN SATURATION: 99 % | WEIGHT: 240 LBS

## 2023-09-14 PROBLEM — O26.90 PREGNANCY WITH COMPLICATION: Status: ACTIVE | Noted: 2023-09-14

## 2023-09-14 LAB
ALBUMIN SERPL BCG-MCNC: 3.6 G/DL (ref 3.5–5.1)
ALP SERPL-CCNC: 205 U/L (ref 38–126)
ALT SERPL W/O P-5'-P-CCNC: 15 U/L (ref 11–66)
ANION GAP SERPL CALC-SCNC: 12 MEQ/L (ref 8–16)
AST SERPL-CCNC: 19 U/L (ref 5–40)
BILIRUB SERPL-MCNC: 0.2 MG/DL (ref 0.3–1.2)
BUN SERPL-MCNC: 9 MG/DL (ref 7–22)
CALCIUM SERPL-MCNC: 8.8 MG/DL (ref 8.5–10.5)
CHLORIDE SERPL-SCNC: 104 MEQ/L (ref 98–111)
CO2 SERPL-SCNC: 21 MEQ/L (ref 23–33)
CREAT SERPL-MCNC: 0.7 MG/DL (ref 0.4–1.2)
CREAT UR-MCNC: 77.4 MG/DL
DEPRECATED RDW RBC AUTO: 42.4 FL (ref 35–45)
ERYTHROCYTE [DISTWIDTH] IN BLOOD BY AUTOMATED COUNT: 15.4 % (ref 11.5–14.5)
GFR SERPL CREATININE-BSD FRML MDRD: > 60 ML/MIN/1.73M2
GLUCOSE SERPL-MCNC: 106 MG/DL (ref 70–108)
HCT VFR BLD AUTO: 35.8 % (ref 37–47)
HGB BLD-MCNC: 11.6 GM/DL (ref 12–16)
MCH RBC QN AUTO: 24.9 PG (ref 26–33)
MCHC RBC AUTO-ENTMCNC: 32.4 GM/DL (ref 32.2–35.5)
MCV RBC AUTO: 77 FL (ref 81–99)
PLATELET # BLD AUTO: 187 THOU/MM3 (ref 130–400)
PMV BLD AUTO: 10.9 FL (ref 9.4–12.4)
POTASSIUM SERPL-SCNC: 4 MEQ/L (ref 3.5–5.2)
PROT SERPL-MCNC: 6 G/DL (ref 6.1–8)
PROT UR-MCNC: 10.4 MG/DL
PROT/CREAT 24H UR: 0.13 MG/G{CREAT}
RBC # BLD AUTO: 4.65 MILL/MM3 (ref 4.2–5.4)
SODIUM SERPL-SCNC: 137 MEQ/L (ref 135–145)
WBC # BLD AUTO: 10.8 THOU/MM3 (ref 4.8–10.8)

## 2023-09-14 PROCEDURE — 82570 ASSAY OF URINE CREATININE: CPT

## 2023-09-14 PROCEDURE — 80053 COMPREHEN METABOLIC PANEL: CPT

## 2023-09-14 PROCEDURE — 84156 ASSAY OF PROTEIN URINE: CPT

## 2023-09-14 PROCEDURE — 85027 COMPLETE CBC AUTOMATED: CPT

## 2023-09-14 PROCEDURE — 36415 COLL VENOUS BLD VENIPUNCTURE: CPT

## 2023-09-14 NOTE — FLOWSHEET NOTE
Dr Emmett Santoro notified of lab results and current blood pressures. Order's rec'd for discharge to home with instructions.

## 2023-09-14 NOTE — FLOWSHEET NOTE
Pt on unit from office for increased blood pressure. Pt denies headache, spots before eyes, or epigastric pain. Dr Godfrey Perea called orders to unit prior to pt arrival. Pt to BR and urine sample obtained.

## 2023-09-14 NOTE — FLOWSHEET NOTE
Pt discharged to home with instructions. Questions answered and pt states understanding. Pt off unit ambulatory.

## 2023-09-18 ENCOUNTER — TELEPHONE (OUTPATIENT)
Dept: FAMILY MEDICINE CLINIC | Age: 28
End: 2023-09-18

## 2023-09-18 NOTE — TELEPHONE ENCOUNTER
Care Transitions Initial Follow Up Call    Outreach made within 2 business days of discharge: Yes    Patient: Jo-Ann Barrett Patient : 1995   MRN: [id number]  Reason for Admission: There are no discharge diagnoses documented for the most recent discharge. Discharge Date: 23       Spoke with: pt    Discharge department/facility: [facility]    TCM Interactive Patient Contact:  Was patient able to fill all prescriptions: Yes  Was patient instructed to bring all medications to the follow-up visit: Yes  Is patient taking all medications as directed in the discharge summary?  Yes  Does patient understand their discharge instructions: Yes  Does patient have questions or concerns that need addressed prior to 7-14 day follow up office visit: no    Scheduled appointment with PCP within 7-14 days    Follow Up  Future Appointments   Date Time Provider [address] [place] [address]   2023  5:00 AM [address]   2024 10:00 AM [facility] [address] , [facility] ([address] [facility])

## 2023-09-22 ENCOUNTER — ANESTHESIA EVENT (OUTPATIENT)
Dept: LABOR AND DELIVERY | Age: 28
End: 2023-09-22
Payer: COMMERCIAL

## 2023-09-22 ENCOUNTER — HOSPITAL ENCOUNTER (INPATIENT)
Age: 28
LOS: 1 days | Discharge: HOME | End: 2023-09-23
Attending: OBSTETRICS & GYNECOLOGY | Admitting: OBSTETRICS & GYNECOLOGY
Payer: COMMERCIAL

## 2023-09-22 ENCOUNTER — ANESTHESIA (OUTPATIENT)
Dept: LABOR AND DELIVERY | Age: 28
End: 2023-09-22
Payer: COMMERCIAL

## 2023-09-22 PROBLEM — Z78.9 ADMITTED TO LABOR AND DELIVERY: Status: ACTIVE | Noted: 2023-09-22

## 2023-09-22 LAB
ABO: NORMAL
AMPHETAMINES UR QL SCN: NEGATIVE
ANTIBODY SCREEN: NORMAL
BARBITURATES UR QL SCN: NEGATIVE
BASOPHILS ABSOLUTE: 0 THOU/MM3 (ref 0–0.1)
BASOPHILS NFR BLD AUTO: 0.2 %
BENZODIAZ UR QL SCN: NEGATIVE
BZE UR QL SCN: NEGATIVE
CANNABINOIDS UR QL SCN: NEGATIVE
DEPRECATED RDW RBC AUTO: 43.6 FL (ref 35–45)
EOSINOPHIL NFR BLD AUTO: 1 %
EOSINOPHILS ABSOLUTE: 0.1 THOU/MM3 (ref 0–0.4)
ERYTHROCYTE [DISTWIDTH] IN BLOOD BY AUTOMATED COUNT: 15.8 % (ref 11.5–14.5)
FENTANYL: NEGATIVE
HCT VFR BLD AUTO: 38.3 % (ref 37–47)
HGB BLD-MCNC: 12.5 GM/DL (ref 12–16)
IMM GRANULOCYTES # BLD AUTO: 0.08 THOU/MM3 (ref 0–0.07)
IMM GRANULOCYTES NFR BLD AUTO: 0.6 %
LYMPHOCYTES ABSOLUTE: 2.2 THOU/MM3 (ref 1–4.8)
LYMPHOCYTES NFR BLD AUTO: 17.8 %
MCH RBC QN AUTO: 25.3 PG (ref 26–33)
MCHC RBC AUTO-ENTMCNC: 32.6 GM/DL (ref 32.2–35.5)
MCV RBC AUTO: 77.4 FL (ref 81–99)
MONOCYTES ABSOLUTE: 0.8 THOU/MM3 (ref 0.4–1.3)
MONOCYTES NFR BLD AUTO: 6.6 %
NEUTROPHILS NFR BLD AUTO: 73.8 %
NRBC BLD AUTO-RTO: 0 /100 WBC
OPIATES UR QL SCN: NEGATIVE
OXYCODONE: NEGATIVE
PCP UR QL SCN: NEGATIVE
PLATELET # BLD AUTO: 206 THOU/MM3 (ref 130–400)
PMV BLD AUTO: 11 FL (ref 9.4–12.4)
RBC # BLD AUTO: 4.95 MILL/MM3 (ref 4.2–5.4)
RH FACTOR: NORMAL
RPR SER QL: NONREACTIVE
SEGMENTED NEUTROPHILS ABSOLUTE COUNT: 9.2 THOU/MM3 (ref 1.8–7.7)
WBC # BLD AUTO: 12.5 THOU/MM3 (ref 4.8–10.8)

## 2023-09-22 PROCEDURE — 80307 DRUG TEST PRSMV CHEM ANLYZR: CPT

## 2023-09-22 PROCEDURE — 2580000003 HC RX 258: Performed by: OBSTETRICS & GYNECOLOGY

## 2023-09-22 PROCEDURE — 1220000000 HC SEMI PRIVATE OB R&B

## 2023-09-22 PROCEDURE — 6360000002 HC RX W HCPCS: Performed by: OBSTETRICS & GYNECOLOGY

## 2023-09-22 PROCEDURE — 86901 BLOOD TYPING SEROLOGIC RH(D): CPT

## 2023-09-22 PROCEDURE — 86900 BLOOD TYPING SEROLOGIC ABO: CPT

## 2023-09-22 PROCEDURE — 86850 RBC ANTIBODY SCREEN: CPT

## 2023-09-22 PROCEDURE — 85025 COMPLETE CBC W/AUTO DIFF WBC: CPT

## 2023-09-22 PROCEDURE — 7200000001 HC VAGINAL DELIVERY

## 2023-09-22 PROCEDURE — 86592 SYPHILIS TEST NON-TREP QUAL: CPT

## 2023-09-22 PROCEDURE — 6370000000 HC RX 637 (ALT 250 FOR IP): Performed by: OBSTETRICS & GYNECOLOGY

## 2023-09-22 PROCEDURE — 3700000025 EPIDURAL BLOCK: Performed by: ANESTHESIOLOGY

## 2023-09-22 PROCEDURE — 0KQM0ZZ REPAIR PERINEUM MUSCLE, OPEN APPROACH: ICD-10-PCS | Performed by: OBSTETRICS & GYNECOLOGY

## 2023-09-22 PROCEDURE — 2500000003 HC RX 250 WO HCPCS: Performed by: NURSE ANESTHETIST, CERTIFIED REGISTERED

## 2023-09-22 RX ORDER — OXYCODONE HYDROCHLORIDE 5 MG/1
10 TABLET ORAL EVERY 4 HOURS PRN
Status: DISCONTINUED | OUTPATIENT
Start: 2023-09-22 | End: 2023-09-23 | Stop reason: HOSPADM

## 2023-09-22 RX ORDER — TERBUTALINE SULFATE 1 MG/ML
0.25 INJECTION, SOLUTION SUBCUTANEOUS
Status: DISCONTINUED | OUTPATIENT
Start: 2023-09-22 | End: 2023-09-22 | Stop reason: HOSPADM

## 2023-09-22 RX ORDER — IBUPROFEN 800 MG/1
800 TABLET ORAL EVERY 8 HOURS PRN
Status: DISCONTINUED | OUTPATIENT
Start: 2023-09-22 | End: 2023-09-22 | Stop reason: HOSPADM

## 2023-09-22 RX ORDER — ONDANSETRON 2 MG/ML
4 INJECTION INTRAMUSCULAR; INTRAVENOUS EVERY 6 HOURS PRN
Status: DISCONTINUED | OUTPATIENT
Start: 2023-09-22 | End: 2023-09-22 | Stop reason: HOSPADM

## 2023-09-22 RX ORDER — METHYLERGONOVINE MALEATE 0.2 MG/ML
200 INJECTION INTRAVENOUS PRN
Status: DISCONTINUED | OUTPATIENT
Start: 2023-09-22 | End: 2023-09-23 | Stop reason: HOSPADM

## 2023-09-22 RX ORDER — ONDANSETRON 4 MG/1
8 TABLET, ORALLY DISINTEGRATING ORAL EVERY 8 HOURS PRN
Status: DISCONTINUED | OUTPATIENT
Start: 2023-09-22 | End: 2023-09-23 | Stop reason: HOSPADM

## 2023-09-22 RX ORDER — ACETAMINOPHEN 325 MG/1
650 TABLET ORAL EVERY 4 HOURS PRN
Status: DISCONTINUED | OUTPATIENT
Start: 2023-09-22 | End: 2023-09-22 | Stop reason: HOSPADM

## 2023-09-22 RX ORDER — SODIUM CHLORIDE 9 MG/ML
INJECTION, SOLUTION INTRAVENOUS PRN
Status: DISCONTINUED | OUTPATIENT
Start: 2023-09-22 | End: 2023-09-22 | Stop reason: HOSPADM

## 2023-09-22 RX ORDER — SODIUM CHLORIDE, SODIUM LACTATE, POTASSIUM CHLORIDE, CALCIUM CHLORIDE 600; 310; 30; 20 MG/100ML; MG/100ML; MG/100ML; MG/100ML
INJECTION, SOLUTION INTRAVENOUS CONTINUOUS
Status: DISCONTINUED | OUTPATIENT
Start: 2023-09-22 | End: 2023-09-22

## 2023-09-22 RX ORDER — MISOPROSTOL 200 UG/1
1000 TABLET ORAL PRN
Status: DISCONTINUED | OUTPATIENT
Start: 2023-09-22 | End: 2023-09-23 | Stop reason: HOSPADM

## 2023-09-22 RX ORDER — SEVOFLURANE 250 ML/250ML
1 LIQUID RESPIRATORY (INHALATION) CONTINUOUS PRN
Status: DISCONTINUED | OUTPATIENT
Start: 2023-09-22 | End: 2023-09-22 | Stop reason: HOSPADM

## 2023-09-22 RX ORDER — ACETAMINOPHEN 500 MG
1000 TABLET ORAL EVERY 8 HOURS PRN
Status: DISCONTINUED | OUTPATIENT
Start: 2023-09-22 | End: 2023-09-23 | Stop reason: HOSPADM

## 2023-09-22 RX ORDER — NALOXONE HYDROCHLORIDE 0.4 MG/ML
INJECTION, SOLUTION INTRAMUSCULAR; INTRAVENOUS; SUBCUTANEOUS PRN
Status: DISCONTINUED | OUTPATIENT
Start: 2023-09-22 | End: 2023-09-22 | Stop reason: HOSPADM

## 2023-09-22 RX ORDER — OXYTOCIN/0.9 % SODIUM CHLORIDE 30/500 ML
87.3 PLASTIC BAG, INJECTION (ML) INTRAVENOUS PRN
Status: DISCONTINUED | OUTPATIENT
Start: 2023-09-22 | End: 2023-09-22

## 2023-09-22 RX ORDER — DIPHENHYDRAMINE HYDROCHLORIDE 50 MG/ML
25 INJECTION INTRAMUSCULAR; INTRAVENOUS EVERY 4 HOURS PRN
Status: DISCONTINUED | OUTPATIENT
Start: 2023-09-22 | End: 2023-09-22 | Stop reason: HOSPADM

## 2023-09-22 RX ORDER — SODIUM CHLORIDE 9 MG/ML
INJECTION, SOLUTION INTRAVENOUS PRN
Status: DISCONTINUED | OUTPATIENT
Start: 2023-09-22 | End: 2023-09-23 | Stop reason: HOSPADM

## 2023-09-22 RX ORDER — OXYTOCIN/0.9 % SODIUM CHLORIDE 30/500 ML
1 PLASTIC BAG, INJECTION (ML) INTRAVENOUS CONTINUOUS
Status: DISCONTINUED | OUTPATIENT
Start: 2023-09-22 | End: 2023-09-22

## 2023-09-22 RX ORDER — METHYLERGONOVINE MALEATE 0.2 MG/ML
200 INJECTION INTRAVENOUS PRN
Status: DISCONTINUED | OUTPATIENT
Start: 2023-09-22 | End: 2023-09-22 | Stop reason: HOSPADM

## 2023-09-22 RX ORDER — LIDOCAINE HCL/EPINEPHRINE/PF 2%-1:200K
VIAL (ML) INJECTION PRN
Status: DISCONTINUED | OUTPATIENT
Start: 2023-09-22 | End: 2023-09-22 | Stop reason: SDUPTHER

## 2023-09-22 RX ORDER — SODIUM CHLORIDE, SODIUM LACTATE, POTASSIUM CHLORIDE, CALCIUM CHLORIDE 600; 310; 30; 20 MG/100ML; MG/100ML; MG/100ML; MG/100ML
INJECTION, SOLUTION INTRAVENOUS CONTINUOUS
Status: DISCONTINUED | OUTPATIENT
Start: 2023-09-22 | End: 2023-09-23 | Stop reason: HOSPADM

## 2023-09-22 RX ORDER — ONDANSETRON 2 MG/ML
8 INJECTION INTRAMUSCULAR; INTRAVENOUS EVERY 6 HOURS PRN
Status: DISCONTINUED | OUTPATIENT
Start: 2023-09-22 | End: 2023-09-22 | Stop reason: HOSPADM

## 2023-09-22 RX ORDER — LIDOCAINE HYDROCHLORIDE 10 MG/ML
30 INJECTION, SOLUTION INFILTRATION; PERINEURAL PRN
Status: DISCONTINUED | OUTPATIENT
Start: 2023-09-22 | End: 2023-09-22 | Stop reason: HOSPADM

## 2023-09-22 RX ORDER — ZOLPIDEM TARTRATE 5 MG/1
5 TABLET ORAL NIGHTLY PRN
Status: DISCONTINUED | OUTPATIENT
Start: 2023-09-22 | End: 2023-09-23 | Stop reason: HOSPADM

## 2023-09-22 RX ORDER — FERROUS SULFATE 325(65) MG
325 TABLET ORAL
Status: DISCONTINUED | OUTPATIENT
Start: 2023-09-23 | End: 2023-09-23 | Stop reason: HOSPADM

## 2023-09-22 RX ORDER — SODIUM CHLORIDE 0.9 % (FLUSH) 0.9 %
5-40 SYRINGE (ML) INJECTION EVERY 12 HOURS SCHEDULED
Status: DISCONTINUED | OUTPATIENT
Start: 2023-09-22 | End: 2023-09-22 | Stop reason: HOSPADM

## 2023-09-22 RX ORDER — MODIFIED LANOLIN
OINTMENT (GRAM) TOPICAL PRN
Status: DISCONTINUED | OUTPATIENT
Start: 2023-09-22 | End: 2023-09-23 | Stop reason: HOSPADM

## 2023-09-22 RX ORDER — CARBOPROST TROMETHAMINE 250 UG/ML
250 INJECTION, SOLUTION INTRAMUSCULAR PRN
Status: DISCONTINUED | OUTPATIENT
Start: 2023-09-22 | End: 2023-09-22 | Stop reason: HOSPADM

## 2023-09-22 RX ORDER — DIPHENHYDRAMINE HCL 25 MG
25 TABLET ORAL EVERY 6 HOURS PRN
Status: DISCONTINUED | OUTPATIENT
Start: 2023-09-22 | End: 2023-09-23 | Stop reason: HOSPADM

## 2023-09-22 RX ORDER — DOCUSATE SODIUM 100 MG/1
100 CAPSULE, LIQUID FILLED ORAL 2 TIMES DAILY
Status: DISCONTINUED | OUTPATIENT
Start: 2023-09-22 | End: 2023-09-23 | Stop reason: HOSPADM

## 2023-09-22 RX ORDER — LIDOCAINE HYDROCHLORIDE 10 MG/ML
INJECTION, SOLUTION INFILTRATION; PERINEURAL PRN
Status: DISCONTINUED | OUTPATIENT
Start: 2023-09-22 | End: 2023-09-22 | Stop reason: SDUPTHER

## 2023-09-22 RX ORDER — TRANEXAMIC ACID 10 MG/ML
1000 INJECTION, SOLUTION INTRAVENOUS
Status: DISCONTINUED | OUTPATIENT
Start: 2023-09-22 | End: 2023-09-22 | Stop reason: HOSPADM

## 2023-09-22 RX ORDER — MORPHINE SULFATE 2 MG/ML
2 INJECTION, SOLUTION INTRAMUSCULAR; INTRAVENOUS
Status: DISCONTINUED | OUTPATIENT
Start: 2023-09-22 | End: 2023-09-22 | Stop reason: HOSPADM

## 2023-09-22 RX ORDER — MORPHINE SULFATE 4 MG/ML
4 INJECTION, SOLUTION INTRAMUSCULAR; INTRAVENOUS
Status: DISCONTINUED | OUTPATIENT
Start: 2023-09-22 | End: 2023-09-22 | Stop reason: HOSPADM

## 2023-09-22 RX ORDER — SODIUM CHLORIDE 0.9 % (FLUSH) 0.9 %
5-40 SYRINGE (ML) INJECTION PRN
Status: DISCONTINUED | OUTPATIENT
Start: 2023-09-22 | End: 2023-09-23 | Stop reason: HOSPADM

## 2023-09-22 RX ORDER — BUTORPHANOL TARTRATE 1 MG/ML
1 INJECTION, SOLUTION INTRAMUSCULAR; INTRAVENOUS
Status: DISCONTINUED | OUTPATIENT
Start: 2023-09-22 | End: 2023-09-22 | Stop reason: HOSPADM

## 2023-09-22 RX ORDER — SODIUM CHLORIDE, SODIUM LACTATE, POTASSIUM CHLORIDE, AND CALCIUM CHLORIDE .6; .31; .03; .02 G/100ML; G/100ML; G/100ML; G/100ML
1000 INJECTION, SOLUTION INTRAVENOUS PRN
Status: DISCONTINUED | OUTPATIENT
Start: 2023-09-22 | End: 2023-09-22 | Stop reason: HOSPADM

## 2023-09-22 RX ORDER — FENTANYL CITRATE 50 UG/ML
50 INJECTION, SOLUTION INTRAMUSCULAR; INTRAVENOUS
Status: DISCONTINUED | OUTPATIENT
Start: 2023-09-22 | End: 2023-09-22 | Stop reason: HOSPADM

## 2023-09-22 RX ORDER — ONDANSETRON 2 MG/ML
4 INJECTION INTRAMUSCULAR; INTRAVENOUS EVERY 6 HOURS PRN
Status: DISCONTINUED | OUTPATIENT
Start: 2023-09-22 | End: 2023-09-23 | Stop reason: HOSPADM

## 2023-09-22 RX ORDER — MISOPROSTOL 200 UG/1
1000 TABLET ORAL PRN
Status: DISCONTINUED | OUTPATIENT
Start: 2023-09-22 | End: 2023-09-22 | Stop reason: HOSPADM

## 2023-09-22 RX ORDER — SODIUM CHLORIDE 0.9 % (FLUSH) 0.9 %
5-40 SYRINGE (ML) INJECTION PRN
Status: DISCONTINUED | OUTPATIENT
Start: 2023-09-22 | End: 2023-09-22 | Stop reason: HOSPADM

## 2023-09-22 RX ORDER — SODIUM CHLORIDE 0.9 % (FLUSH) 0.9 %
5-40 SYRINGE (ML) INJECTION EVERY 12 HOURS SCHEDULED
Status: DISCONTINUED | OUTPATIENT
Start: 2023-09-22 | End: 2023-09-23 | Stop reason: HOSPADM

## 2023-09-22 RX ORDER — SODIUM CHLORIDE, SODIUM LACTATE, POTASSIUM CHLORIDE, AND CALCIUM CHLORIDE .6; .31; .03; .02 G/100ML; G/100ML; G/100ML; G/100ML
500 INJECTION, SOLUTION INTRAVENOUS PRN
Status: DISCONTINUED | OUTPATIENT
Start: 2023-09-22 | End: 2023-09-22 | Stop reason: HOSPADM

## 2023-09-22 RX ORDER — OXYCODONE HYDROCHLORIDE 5 MG/1
5 TABLET ORAL EVERY 4 HOURS PRN
Status: DISCONTINUED | OUTPATIENT
Start: 2023-09-22 | End: 2023-09-23 | Stop reason: HOSPADM

## 2023-09-22 RX ORDER — IBUPROFEN 800 MG/1
800 TABLET ORAL EVERY 8 HOURS SCHEDULED
Status: DISCONTINUED | OUTPATIENT
Start: 2023-09-22 | End: 2023-09-23 | Stop reason: HOSPADM

## 2023-09-22 RX ADMIN — LIDOCAINE HYDROCHLORIDE AND EPINEPHRINE 2 ML: 20; 5 INJECTION, SOLUTION EPIDURAL; INFILTRATION; INTRACAUDAL; PERINEURAL at 01:58

## 2023-09-22 RX ADMIN — IBUPROFEN 800 MG: 800 TABLET, FILM COATED ORAL at 13:12

## 2023-09-22 RX ADMIN — Medication 10 ML: at 01:59

## 2023-09-22 RX ADMIN — Medication 18 ML/HR: at 02:00

## 2023-09-22 RX ADMIN — METHYLERGONOVINE MALEATE 200 MCG: 0.2 INJECTION, SOLUTION INTRAMUSCULAR; INTRAVENOUS at 06:56

## 2023-09-22 RX ADMIN — SODIUM CHLORIDE, POTASSIUM CHLORIDE, SODIUM LACTATE AND CALCIUM CHLORIDE: 600; 310; 30; 20 INJECTION, SOLUTION INTRAVENOUS at 02:06

## 2023-09-22 RX ADMIN — Medication 166.7 ML: at 06:20

## 2023-09-22 RX ADMIN — LIDOCAINE HYDROCHLORIDE 3 MG: 10 INJECTION, SOLUTION INFILTRATION; PERINEURAL at 01:47

## 2023-09-22 RX ADMIN — IBUPROFEN 800 MG: 800 TABLET, FILM COATED ORAL at 22:21

## 2023-09-22 RX ADMIN — SODIUM CHLORIDE, POTASSIUM CHLORIDE, SODIUM LACTATE AND CALCIUM CHLORIDE: 600; 310; 30; 20 INJECTION, SOLUTION INTRAVENOUS at 01:07

## 2023-09-22 RX ADMIN — LIDOCAINE HYDROCHLORIDE AND EPINEPHRINE 3 ML: 20; 5 INJECTION, SOLUTION EPIDURAL; INFILTRATION; INTRACAUDAL; PERINEURAL at 01:54

## 2023-09-22 RX ADMIN — ACETAMINOPHEN 1000 MG: 500 TABLET ORAL at 17:46

## 2023-09-22 ASSESSMENT — PAIN DESCRIPTION - LOCATION
LOCATION: ABDOMEN;INCISION
LOCATION: ABDOMEN
LOCATION: ABDOMEN

## 2023-09-22 ASSESSMENT — PAIN DESCRIPTION - DESCRIPTORS
DESCRIPTORS: ACHING;DISCOMFORT
DESCRIPTORS: CRAMPING
DESCRIPTORS: CRAMPING

## 2023-09-22 ASSESSMENT — PAIN DESCRIPTION - ORIENTATION
ORIENTATION: LOWER

## 2023-09-22 ASSESSMENT — PAIN SCALES - GENERAL
PAINLEVEL_OUTOF10: 2
PAINLEVEL_OUTOF10: 4
PAINLEVEL_OUTOF10: 4

## 2023-09-22 ASSESSMENT — PAIN - FUNCTIONAL ASSESSMENT
PAIN_FUNCTIONAL_ASSESSMENT: ACTIVITIES ARE NOT PREVENTED
PAIN_FUNCTIONAL_ASSESSMENT: ACTIVITIES ARE NOT PREVENTED

## 2023-09-22 NOTE — ANESTHESIA POSTPROCEDURE EVALUATION
Department of Anesthesiology  Postprocedure Note    Patient: Faith Bustamante  MRN: [id number]  YOB: 1995  Date of evaluation: 9/22/2023      Procedure Summary     Date: 09/22/23 Room / Location:     Anesthesia Start: 0139 Anesthesia Stop: 4081    Procedure: Labor Analgesia Diagnosis:     Scheduled Providers:  Responsible Provider: Tk Ellis MD    Anesthesia Type: epidural ASA Status: 2          Anesthesia Type: No value filed.     Elizabeth Phase I: Elizabeth Score: 9    Elizabeth Phase II: Elizabeth Score: 10      Anesthesia Post Evaluation    Patient location during evaluation: floor  Patient participation: complete - patient participated  Level of consciousness: awake and alert  Airway patency: patent  Nausea & Vomiting: no nausea and no vomiting  Complications: no  Cardiovascular status: hemodynamically stable  Respiratory status: acceptable, room air and spontaneous ventilation  Hydration status: stable  Pain management: adequate

## 2023-09-22 NOTE — PLAN OF CARE
Problem: Postpartum  Goal: Experiences normal postpartum course  Description:  Postpartum OB-Pregnancy care plan goal which identifies if the mother is experiencing a normal postpartum course  2023 by Marylou Quiñones RN  Outcome: Progressing  Flowsheets (Taken 2023)  Experiences Normal Postpartum Course:   Monitor maternal vital signs   Assess uterine involution     Problem: Postpartum  Goal: Appropriate maternal -  bonding  Description:  Postpartum OB-Pregnancy care plan goal which identifies if the mother and  are bonding appropriately  Outcome: Progressing  Note: Infant has roomed in with mother this shift . Benefits of rooming in provided.         Problem: Postpartum  Goal: Incisions, wounds, or drain sites healing without S/S of infection  Outcome: Progressing  Flowsheets (Taken 2023)  Incisions, Wounds, or Drain Sites Healing Without Sign and Symptoms of Infection: TWICE DAILY: Assess and document skin integrity     Problem: Pain  Goal: Verbalizes/displays adequate comfort level or baseline comfort level  Outcome: Progressing  Flowsheets (Taken 2023 1005)  Verbalizes/displays adequate comfort level or baseline comfort level:   Encourage patient to monitor pain and request assistance   Assess pain using appropriate pain scale   Administer analgesics based on type and severity of pain and evaluate response   Implement non-pharmacological measures as appropriate and evaluate response     Problem: Infection - Adult  Goal: Absence of infection at discharge  Outcome: Progressing  Flowsheets (Taken 2023)  Absence of infection at discharge: Assess and monitor for signs and symptoms of infection     Problem: Infection - Adult  Goal: Absence of infection during hospitalization  Outcome: Progressing  Flowsheets (Taken 2023)  Absence of infection during hospitalization:   Assess and monitor for signs and symptoms of infection   Monitor

## 2023-09-22 NOTE — FLOWSHEET NOTE
Pt brought out to room via wheelchair from labor and delivery from [address] [facility] Patient stable, into bed with family members at side. Infant skin to skin on mothers chest. No needs voiced at this time. Family at bedside. Call light at side.

## 2023-09-22 NOTE — FLOWSHEET NOTE
Assisted to the bathroom for the first time. Voided large amount. Small amount of vaginal bleeding noted at void. Aarti care instructed and done by pt. Back to bed, positioned for comfort. Fundus firm one fingersbreath below the umbilicus after void. No needs voiced.  Pt going to rest.

## 2023-09-22 NOTE — DISCHARGE INSTRUCTIONS
After Your Delivery Discharge Instructions    After Discharge Orders:  Future Appointments   Date Time Provider [address]   2024 10:00 AM Adama Patel MD [facility] ERAN Ko      [unfilled]        After your delivery - signs and symptoms to watch for:  Fever - Oral temperature greater than 101.4 degrees Fahrenheit  Foul-smelling vaginal discharge  Headache unrelieved by \"pain meds\"  Difficulty urinating  Breasts reddened, hard, hot to the touch  Nipple discharge which is foul-smelling or contains pus  Increased pain at the site of the surgical incision  Difficulty breathing with or without chest pain  New calf pain especially if only on one side  Sudden, continuing increased vaginal bleeding with or without clots  Unrelieved feelings of: Inability to cope  Sadness  Anxiety  Lack of interest in baby  Insomnia  Crying     What to do at home:  See patient education handouts for full information  Resume activity gradually   Don't lift anything heavier than baby and carrier until OK'd by your Physician  No sex until OK'd by your Physician  Take care of yourself by sleeping/resting as much as possible  Eat regular nutritious meals  Let someone else care for you, your baby, and housework as much as possible   Take pain medication as prescribed whenever you need them  Wear compression stockings if prescribed   To avoid/relieve constipation take stool softeners if advised   Drink lots of water/fruit juices  Increase fiber in your diet  Breast care: Wear support bra ; use lanolin ointment/cream as needed     Refer to  Discharge Instructions for problems or follow-up regarding  Feeding  Return to Office in 4-5 weeks for PP visit.   Discharge instructions kalebed    Cory Boss MD, MD

## 2023-09-22 NOTE — LACTATION NOTE
Met with pt to assist with feeding. Baby latched onto right breast and suckled well. Pt denies concerns. Offered to return prn for support.

## 2023-09-22 NOTE — FLOWSHEET NOTE
Pericare given. Pt denies need to void. Pt to W/C and transferred to [id number] and report given to [facility] at bedside.

## 2023-09-22 NOTE — FLOWSHEET NOTE
Ruth Okeefe CRNA phoned unit, reviewed patients SVE and tolerance of labor/SVE's, patient requesting epidural; labs are sent and pending at this time, first IV fluid bolus infusing. Reviewed patient's history, VS. CRNA en route to discuss with patient.

## 2023-09-22 NOTE — ANESTHESIA PROCEDURE NOTES
Epidural Block    Patient location during procedure: OB  Start time: 9/22/2023 1:44 AM  End time: 9/22/2023 1:54 AM  Reason for block: labor epidural  Staffing  Performed: resident/CRNA   Anesthesiologist: Vasiliy Gardner MD  Resident/CRNA: Army Diaz  Performed by: Army Diaz  Authorized by: Vasiliy Gardner MD    Epidural  Patient position: sitting  Prep: ChloraPrep  Patient monitoring: continuous pulse ox and frequent blood pressure checks  Approach: midline  Location: L4-5  Injection technique: DREW saline  Guidance: paresthesia technique  Provider prep: mask and sterile gloves  Needle  Needle type: Tuohy   Needle gauge: 18 G  Needle length: 3.5 in  Needle insertion depth: 6 cm  Catheter type: side hole  Catheter size: 20 G  Catheter at skin depth: 12 cm  Test dose: negative  Kit: [id number]  Lot number: [id number]  Expiration date: 5/31/2024Catheter Secured: tegaderm and tape  Assessment  Hemodynamics: stable  Attempts: 1  Outcomes: uncomplicated and patient tolerated procedure well  Preanesthetic Checklist  Completed: patient identified, IV checked, site marked, risks and benefits discussed, surgical/procedural consents, equipment checked, pre-op evaluation, timeout performed, anesthesia consent given, oxygen available, monitors applied/VS acknowledged, fire risk safety assessment completed and verbalized and blood product R/B/A discussed and consented

## 2023-09-22 NOTE — LACTATION NOTE
Met pt in room. Assisted with supporting breast with wash clothe, positioning pillows behind her and placing baby in good position for latch on left breast. Baby latched and suckled well. Offered support prn, reviewed use of hands free pump. Pt knows about support available.

## 2023-09-22 NOTE — FLOWSHEET NOTE
Infant has roomed in with mother this shift except for a few hours pt wanted to rest. Benefits of rooming in provided.

## 2023-09-22 NOTE — H&P
Ana Laura  History and Physical Update    Pt Name: Vladimir Qureshi  MRN: [id number]  YOB: 1995  Date of evaluation: 2023    [] I have examined the patient and reviewed the H&P/Consult and there are no changes to the patient or plans. [x] I have examined the patient and reviewed the H&P/Consult and have noted the following changes:   29yo  at 39/4 weeks presented in active labor CTX every 2min. She does not tolerate a cervical exam well. Upon my arrival pt is 7/90/0. Recommended Pt get an epidural if she desires and she is agreeable. FHT category 1. GBS negative.           Savannah Calhoun MD,MD  Electronically signed 2023 at 1:54 AM

## 2023-09-22 NOTE — FLOWSHEET NOTE
Dr. Flakito Garza at nurses' station. Informed provider patient had 250g rubra lochia w/clots, orders received to administer methergine IM.

## 2023-09-22 NOTE — DISCHARGE SUMMARY
Vaginal Delivery Discharge Summary    Gestational Age:39w4d    Antepartum complications: none    Type of Delivery: Vaginal     Labs: CBC   Lab Results   Component Value Date    HGB 12.5 09/22/2023    HCT 38.3 09/22/2023        Intrapartum complications: None    Postpartum complications: none    The patient is ambulating well. The patient is tolerating a normal diet.     Condition: Stable    Discharge to home date: 09/23    Plan:   Follow up in 5 week(s)    Rajendra Guillen MD

## 2023-09-22 NOTE — FLOWSHEET NOTE
arrives to the unit reporting ctx that have been occurring off and on throughout the day and started increasing in intensity and frequency at [id number]. Reports positive fetal movement, denies leaking of fluid and vaginal bleeding. States she was 1cm in the office yesterday. Patient to bathroom to void, informed of maternal drug testing policy in place on all laboring patients. Verbal consent received, paper consent to be signed and urine to be sent.

## 2023-09-22 NOTE — L&D DELIVERY NOTE
Department of Obstetrics and Gynecology   Vaginal Delivery Note at [facility]  Date of Delivery:  2023    Procedure:  Spontaneous vaginal delivery    Surgeon:   MINA Rodriguez    Past Medical History:   Past Medical History:   Diagnosis Date    Acne     Dr. Ugo Swan. Concussion 10/27/2015    Hypertension     GHTN G1    Obesity        Anesthesia:  epidural anesthesia    Estimated blood loss:  500ml    Specimen:  Placenta not sent to pathology     Cord blood sent Yes    Complications:  none    Condition:  infant stable to general nursery and mother stable    Details of Procedure: The patient is a 34 y.o. female at 43w3d   OB History          2    Para   1    Term   1       0    AB   0    Living   1         SAB   0    IAB   0    Ectopic   0    Molar   0    Multiple   0    Live Births   1             who was admitted for active phase labor. She received the following interventions: none. The patient progressed well,did receive an epidural, became complete and started to push. Patient progressed well and the fetal head was delivered over an intact perineum, nose and mouth suctioned with bulb suction and the rest of the infant delivered atraumatically, placed on mother abdomen. Cord was clamped and cut and infant handed off to the waiting nurse for evaluation. The delivery of the placenta was spontaneous. The perineum and vagina were explored and a second degree laceration was repaired in standard fashion  with 3-0 vicryl. Vaginal sweep was performed and there were no sponges retained in the vagina. All sharps were discarded. Infant Wt:   Information for the patient's : Luis Al [[id number]]           APGARS:     Information for the patient's :   Luis Al [[id number]]        Nancy Walter MD

## 2023-09-23 VITALS
RESPIRATION RATE: 16 BRPM | HEIGHT: 70 IN | HEART RATE: 68 BPM | DIASTOLIC BLOOD PRESSURE: 80 MMHG | BODY MASS INDEX: 34.36 KG/M2 | OXYGEN SATURATION: 98 % | TEMPERATURE: 98 F | WEIGHT: 240 LBS | SYSTOLIC BLOOD PRESSURE: 128 MMHG

## 2023-09-23 PROCEDURE — 6370000000 HC RX 637 (ALT 250 FOR IP): Performed by: OBSTETRICS & GYNECOLOGY

## 2023-09-23 RX ADMIN — ACETAMINOPHEN 1000 MG: 500 TABLET ORAL at 06:41

## 2023-09-23 RX ADMIN — DOCUSATE SODIUM 100 MG: 100 CAPSULE, LIQUID FILLED ORAL at 06:57

## 2023-09-23 ASSESSMENT — PAIN DESCRIPTION - DESCRIPTORS: DESCRIPTORS: CRAMPING

## 2023-09-23 ASSESSMENT — PAIN DESCRIPTION - ORIENTATION: ORIENTATION: LOWER

## 2023-09-23 ASSESSMENT — PAIN SCALES - GENERAL: PAINLEVEL_OUTOF10: 4

## 2023-09-23 ASSESSMENT — PAIN DESCRIPTION - LOCATION: LOCATION: ABDOMEN

## 2023-09-23 NOTE — PLAN OF CARE
Problem: Postpartum  Goal: Experiences normal postpartum course  Description:  Postpartum OB-Pregnancy care plan goal which identifies if the mother is experiencing a normal postpartum course  2023 by Kaylin Pedroza RN  Outcome: Progressing  Flowsheets  Taken 2023  Experiences Normal Postpartum Course: Monitor maternal vital signs  Taken 2023  Experiences Normal Postpartum Course: Monitor maternal vital signs     Problem: Postpartum  Goal: Appropriate maternal -  bonding  Description:  Postpartum OB-Pregnancy care plan goal which identifies if the mother and  are bonding appropriately  2023 by Kaylin Pedroza RN  Outcome: Progressing  Note: Infant has roomed in with mother this shift except for maternal exhaustion. Benefits of rooming in discussed.         Problem: Postpartum  Goal: Establishment of infant feeding pattern  Description:  Postpartum OB-Pregnancy care plan goal which identifies if the mother is establishing a feeding pattern with their   2023 by Kaylin Pedroza RN  Outcome: Progressing  Flowsheets  Taken 2023  Establishment of Infant Feeding Pattern: Assess breast/bottle feeding  Taken 2023  Establishment of Infant Feeding Pattern: Assess breast/bottle feeding     Problem: Postpartum  Goal: Incisions, wounds, or drain sites healing without S/S of infection  2023 by Kaylin Pedroza RN  Outcome: Progressing  Flowsheets  Taken 2023  Incisions, Wounds, or Drain Sites Healing Without Sign and Symptoms of Infection: TWICE DAILY: Assess and document skin integrity  Taken 2023  Incisions, Wounds, or Drain Sites Healing Without Sign and Symptoms of Infection: TWICE DAILY: Assess and document skin integrity     Problem: Pain  Goal: Verbalizes/displays adequate comfort level or baseline comfort level  2023 by Kaylin Pedroza RN  Outcome: Progressing  Flowsheets  Taken

## 2023-09-23 NOTE — PLAN OF CARE
Problem: Postpartum  Goal: Experiences normal postpartum course  Description:  Postpartum OB-Pregnancy care plan goal which identifies if the mother is experiencing a normal postpartum course  2023 by Ashley Hooks RN  Outcome: Progressing  Flowsheets (Taken 2023)  Experiences Normal Postpartum Course: Monitor maternal vital signs     Problem: Postpartum  Goal: Appropriate maternal -  bonding  Description:  Postpartum OB-Pregnancy care plan goal which identifies if the mother and  are bonding appropriately  2023 by Ashley Hooks RN  Outcome: Progressing     Problem: Postpartum  Goal: Establishment of infant feeding pattern  Description:  Postpartum OB-Pregnancy care plan goal which identifies if the mother is establishing a feeding pattern with their   2023 by Ashley Hooks RN  Outcome: Progressing  Flowsheets (Taken 2023)  Establishment of Infant Feeding Pattern: Assess breast/bottle feeding     Problem: Postpartum  Goal: Incisions, wounds, or drain sites healing without S/S of infection  2023 by Ashley Hooks RN  Outcome: Progressing  Flowsheets (Taken 2023 by Curtis Beavers RN)  Incisions, Wounds, or Drain Sites Healing Without Sign and Symptoms of Infection: TWICE DAILY: Assess and document skin integrity     Problem: Pain  Goal: Verbalizes/displays adequate comfort level or baseline comfort level  2023 by Ashley Hooks RN  Outcome: Progressing  Flowsheets (Taken 2023)  Verbalizes/displays adequate comfort level or baseline comfort level: Encourage patient to monitor pain and request assistance     Problem: Infection - Adult  Goal: Absence of infection at discharge  2023 by Ashley Hooks RN  Outcome: Progressing  Flowsheets (Taken 2023)  Absence of infection at discharge: Assess and monitor for signs and symptoms of infection     Problem: Infection -

## 2023-09-23 NOTE — FLOWSHEET NOTE
Post birth warning signs education paper given and reviewed, teaching complete. Calvin postpartum depression screening discussed with patient, instructed to contact her healthcare provider if her score is > 10. Patient voiced understanding. Mother's blood type is B+.  Mother did not receive Rhogam.

## 2023-09-25 ENCOUNTER — TELEPHONE (OUTPATIENT)
Dept: FAMILY MEDICINE CLINIC | Age: 28
End: 2023-09-25

## 2023-09-25 NOTE — TELEPHONE ENCOUNTER
Care Transitions Initial Follow Up Call    Outreach made within 2 business days of discharge: Yes    Patient: Jamie Gaming Patient : 1995   MRN: [id number]  Reason for Admission: There are no discharge diagnoses documented for the most recent discharge. Discharge Date: 23       Spoke with: ANTONIA for patient to return call at their earliest convenience.      Discharge department/facility: [facility]    Scheduled appointment with PCP within 7-14 days    Follow Up  Future Appointments   Date Time Provider [facility] [address]   2024 10:00 AM Yong Riojas MD [facility] - [address], CMA ([address] [facility])

## 2023-09-29 NOTE — TELEPHONE ENCOUNTER
Care Transitions Initial Follow Up Call    Outreach made within 2 business days of discharge: Yes    Patient: Sharif Collado Patient : 1995   MRN: [id number]  Reason for Admission: There are no discharge diagnoses documented for the most recent discharge. Discharge Date: 23       Spoke with: Left message for pt to return call.     Discharge department/facility: [facility]

## 2023-10-19 ENCOUNTER — HOSPITAL ENCOUNTER (EMERGENCY)
Age: 28
Discharge: HOME | End: 2023-10-19
Payer: COMMERCIAL

## 2023-10-19 VITALS
SYSTOLIC BLOOD PRESSURE: 118 MMHG | WEIGHT: 225 LBS | TEMPERATURE: 98.9 F | RESPIRATION RATE: 16 BRPM | DIASTOLIC BLOOD PRESSURE: 83 MMHG | BODY MASS INDEX: 32.28 KG/M2 | HEART RATE: 115 BPM | OXYGEN SATURATION: 96 %

## 2023-10-19 DIAGNOSIS — J02.9 VIRAL PHARYNGITIS: Primary | ICD-10-CM

## 2023-10-19 LAB — S PYO AG THROAT QL: NEGATIVE

## 2023-10-19 PROCEDURE — 87651 STREP A DNA AMP PROBE: CPT

## 2023-10-19 PROCEDURE — 99213 OFFICE O/P EST LOW 20 MIN: CPT | Performed by: NURSE PRACTITIONER

## 2023-10-19 PROCEDURE — 99213 OFFICE O/P EST LOW 20 MIN: CPT

## 2023-10-19 RX ORDER — PREDNISONE 20 MG/1
40 TABLET ORAL DAILY
Qty: 10 TABLET | Refills: 0 | Status: SHIPPED | OUTPATIENT
Start: 2023-10-19 | End: 2023-10-24

## 2023-10-19 ASSESSMENT — PAIN DESCRIPTION - LOCATION: LOCATION: THROAT

## 2023-10-19 ASSESSMENT — PAIN - FUNCTIONAL ASSESSMENT: PAIN_FUNCTIONAL_ASSESSMENT: 0-10

## 2023-10-19 ASSESSMENT — ENCOUNTER SYMPTOMS: SORE THROAT: 1

## 2023-10-19 ASSESSMENT — PAIN SCALES - GENERAL: PAINLEVEL_OUTOF10: 1

## 2023-10-19 NOTE — ED TRIAGE NOTES
Has had mainly a sore throat for the last week, worse the last 2 days, has had a little runny nose, started with a low grade fever last night 99.8 and again this morning

## 2023-10-19 NOTE — ED PROVIDER NOTES
[address] [facility] Encounter      [address] [facility]       Chief Complaint   Patient presents with    Pharyngitis       Nurses Notes reviewed and I agree except as noted in the HPI. HISTORY OF PRESENT ILLNESS   Jude Siu is a 34 y.o. female who presents urgent care with complaints of a sore throat. She has been sick for approximately 2 days. Denies any body aches. Maybe has had a mild fever. REVIEW OF SYSTEMS     Review of Systems   Constitutional:  Positive for fatigue. HENT:  Positive for sore throat. PAST MEDICAL HISTORY         Diagnosis Date    Acne     Dr. Velma Petersen. Concussion 10/27/2015    Hypertension     GHTN G1    Obesity        SURGICAL HISTORY     Patient  has a past surgical history that includes Tympanostomy tube placement; Anterior cruciate ligament repair (07/01/2012); [place] tooth extraction (Bilateral, 05/15/2015); laparoscopy (N/A, 02/14/2021); and Ovary surgery (Left, 04/06/2021). CURRENT MEDICATIONS       Discharge Medication List as of 10/19/2023 11:49 AM        CONTINUE these medications which have NOT CHANGED    Details   acetaminophen (AMINOFEN) 325 MG tablet Take 2 tablets by mouth every 6 hours as needed for Pain, Disp-120 tablet, R-3OTC      Prenatal MV-Min-Fe Fum-FA-DHA (PRENATAL 1 PO) Take by mouthHistorical Med             ALLERGIES     Patient is is allergic to cefdinir. FAMILY HISTORY     Patient'sfamily history includes Alzheimer's Disease in her paternal grandfather; Breast Cancer in her maternal grandmother and paternal grandmother; Cancer in her maternal grandmother; Cancer (age of onset: 61) in her paternal grandmother; Crohn's Disease in her father; Diabetes in her maternal grandfather; Heart Disease in her maternal grandfather and maternal grandmother; High Blood Pressure in her father and mother; Kidney Disease in her maternal grandfather; Thyroid Disease in her paternal grandfather.     SOCIAL HISTORY     Patient

## 2023-10-19 NOTE — DISCHARGE INSTRUCTIONS
Medications  as prescribed. Continue acetaminophen and ibuprofen as needed for fever, body aches, chills. Report to ER with new or severe symptoms.

## 2023-12-04 ENCOUNTER — HOSPITAL ENCOUNTER (EMERGENCY)
Age: 28
Discharge: HOME | End: 2023-12-04
Payer: COMMERCIAL

## 2023-12-04 VITALS
BODY MASS INDEX: 32.58 KG/M2 | HEIGHT: 69 IN | OXYGEN SATURATION: 97 % | RESPIRATION RATE: 18 BRPM | TEMPERATURE: 99.1 F | SYSTOLIC BLOOD PRESSURE: 113 MMHG | HEART RATE: 105 BPM | WEIGHT: 220 LBS | DIASTOLIC BLOOD PRESSURE: 78 MMHG

## 2023-12-04 DIAGNOSIS — J06.9 UPPER RESPIRATORY TRACT INFECTION, UNSPECIFIED TYPE: Primary | ICD-10-CM

## 2023-12-04 PROCEDURE — 99213 OFFICE O/P EST LOW 20 MIN: CPT

## 2023-12-04 PROCEDURE — 99213 OFFICE O/P EST LOW 20 MIN: CPT | Performed by: NURSE PRACTITIONER

## 2023-12-04 RX ORDER — BENZONATATE 200 MG/1
200 CAPSULE ORAL 3 TIMES DAILY PRN
Qty: 21 CAPSULE | Refills: 0 | Status: SHIPPED | OUTPATIENT
Start: 2023-12-04 | End: 2023-12-11

## 2023-12-04 RX ORDER — PREDNISONE 20 MG/1
40 TABLET ORAL DAILY
Qty: 14 TABLET | Refills: 0 | Status: SHIPPED | OUTPATIENT
Start: 2023-12-04 | End: 2023-12-11

## 2023-12-04 ASSESSMENT — ENCOUNTER SYMPTOMS
SORE THROAT: 0
VOMITING: 0
SHORTNESS OF BREATH: 0
DIARRHEA: 0
COUGH: 1
NAUSEA: 0

## 2023-12-04 ASSESSMENT — PAIN - FUNCTIONAL ASSESSMENT: PAIN_FUNCTIONAL_ASSESSMENT: NONE - DENIES PAIN

## 2023-12-04 NOTE — ED NOTES
Pt with complaints of a cough and nasal congestion for 2 weeks and a fever that started last night. States she has tried decongestants but they have not helped. States cough is productive with yellow mucus.      Zandra Mabry, NINO  65/37/27 1149

## 2023-12-09 ENCOUNTER — NURSE ONLY (OUTPATIENT)
Dept: LAB | Age: 28
End: 2023-12-09

## 2023-12-09 DIAGNOSIS — Z13.1 SCREENING FOR DIABETES MELLITUS: ICD-10-CM

## 2023-12-09 DIAGNOSIS — E66.9 CLASS 1 OBESITY WITHOUT SERIOUS COMORBIDITY IN ADULT, UNSPECIFIED BMI, UNSPECIFIED OBESITY TYPE: ICD-10-CM

## 2023-12-09 DIAGNOSIS — Z13.220 LIPID SCREENING: ICD-10-CM

## 2023-12-09 DIAGNOSIS — Z00.00 LABORATORY EXAM ORDERED AS PART OF ROUTINE GENERAL MEDICAL EXAMINATION: ICD-10-CM

## 2023-12-09 LAB
ALBUMIN SERPL BCG-MCNC: 4.2 G/DL (ref 3.5–5.1)
ALP SERPL-CCNC: 85 U/L (ref 38–126)
ALT SERPL W/O P-5'-P-CCNC: 36 U/L (ref 11–66)
ANION GAP SERPL CALC-SCNC: 12 MEQ/L (ref 8–16)
AST SERPL-CCNC: 21 U/L (ref 5–40)
BASOPHILS ABSOLUTE: 0 THOU/MM3 (ref 0–0.1)
BASOPHILS NFR BLD AUTO: 0.3 %
BILIRUB SERPL-MCNC: 0.5 MG/DL (ref 0.3–1.2)
BUN SERPL-MCNC: 15 MG/DL (ref 7–22)
CALCIUM SERPL-MCNC: 9.6 MG/DL (ref 8.5–10.5)
CHLORIDE SERPL-SCNC: 100 MEQ/L (ref 98–111)
CHOLEST SERPL-MCNC: 220 MG/DL (ref 100–199)
CO2 SERPL-SCNC: 28 MEQ/L (ref 23–33)
CREAT SERPL-MCNC: 1 MG/DL (ref 0.4–1.2)
DEPRECATED RDW RBC AUTO: 44.3 FL (ref 35–45)
EOSINOPHIL NFR BLD AUTO: 0.6 %
EOSINOPHILS ABSOLUTE: 0.1 THOU/MM3 (ref 0–0.4)
ERYTHROCYTE [DISTWIDTH] IN BLOOD BY AUTOMATED COUNT: 15.6 % (ref 11.5–14.5)
GFR SERPL CREATININE-BSD FRML MDRD: > 60 ML/MIN/1.73M2
GLUCOSE SERPL-MCNC: 84 MG/DL (ref 70–108)
HCT VFR BLD AUTO: 44.4 % (ref 37–47)
HDLC SERPL-MCNC: 41 MG/DL
HGB BLD-MCNC: 14.1 GM/DL (ref 12–16)
IMM GRANULOCYTES # BLD AUTO: 0.08 THOU/MM3 (ref 0–0.07)
IMM GRANULOCYTES NFR BLD AUTO: 0.6 %
LDLC SERPL CALC-MCNC: 141 MG/DL
LYMPHOCYTES ABSOLUTE: 3.1 THOU/MM3 (ref 1–4.8)
LYMPHOCYTES NFR BLD AUTO: 21.8 %
MCH RBC QN AUTO: 25 PG (ref 26–33)
MCHC RBC AUTO-ENTMCNC: 31.8 GM/DL (ref 32.2–35.5)
MCV RBC AUTO: 78.6 FL (ref 81–99)
MONOCYTES ABSOLUTE: 1 THOU/MM3 (ref 0.4–1.3)
MONOCYTES NFR BLD AUTO: 6.8 %
NEUTROPHILS NFR BLD AUTO: 69.9 %
NRBC BLD AUTO-RTO: 0 /100 WBC
PLATELET # BLD AUTO: 344 THOU/MM3 (ref 130–400)
PMV BLD AUTO: 10.7 FL (ref 9.4–12.4)
POTASSIUM SERPL-SCNC: 4 MEQ/L (ref 3.5–5.2)
PROT SERPL-MCNC: 8.1 G/DL (ref 6.1–8)
RBC # BLD AUTO: 5.65 MILL/MM3 (ref 4.2–5.4)
SEGMENTED NEUTROPHILS ABSOLUTE COUNT: 10 THOU/MM3 (ref 1.8–7.7)
SODIUM SERPL-SCNC: 140 MEQ/L (ref 135–145)
T4 FREE SERPL-MCNC: 1.36 NG/DL (ref 0.93–1.76)
TRIGL SERPL-MCNC: 190 MG/DL (ref 0–199)
TSH SERPL DL<=0.005 MIU/L-ACNC: 0.38 UIU/ML (ref 0.4–4.2)
WBC # BLD AUTO: 14.3 THOU/MM3 (ref 4.8–10.8)

## 2023-12-11 ENCOUNTER — OFFICE VISIT (OUTPATIENT)
Dept: FAMILY MEDICINE CLINIC | Age: 28
End: 2023-12-11
Payer: COMMERCIAL

## 2023-12-11 VITALS
RESPIRATION RATE: 18 BRPM | TEMPERATURE: 97.6 F | SYSTOLIC BLOOD PRESSURE: 120 MMHG | DIASTOLIC BLOOD PRESSURE: 80 MMHG | HEIGHT: 69 IN | OXYGEN SATURATION: 96 % | WEIGHT: 225 LBS | BODY MASS INDEX: 33.33 KG/M2 | HEART RATE: 71 BPM

## 2023-12-11 DIAGNOSIS — R05.2 SUBACUTE COUGH: Primary | ICD-10-CM

## 2023-12-11 PROCEDURE — 99213 OFFICE O/P EST LOW 20 MIN: CPT

## 2023-12-11 RX ORDER — DEXTROAMPHETAMINE SULFATE 20 MG/1
10 TABLET ORAL EVERY 6 HOURS PRN
Qty: 60 TABLET | Refills: 0 | Status: CANCELLED | OUTPATIENT
Start: 2023-12-11 | End: 2023-12-26

## 2023-12-11 RX ORDER — GUAIFENESIN 600 MG/1
600 TABLET, EXTENDED RELEASE ORAL 2 TIMES DAILY
Qty: 30 TABLET | Refills: 0 | Status: CANCELLED | OUTPATIENT
Start: 2023-12-11 | End: 2023-12-26

## 2023-12-11 ASSESSMENT — ENCOUNTER SYMPTOMS
RHINORRHEA: 1
SINUS PRESSURE: 0
SINUS PAIN: 0
SORE THROAT: 0
SHORTNESS OF BREATH: 0
NAUSEA: 0
VOMITING: 0

## 2023-12-13 ENCOUNTER — TELEPHONE (OUTPATIENT)
Dept: FAMILY MEDICINE CLINIC | Age: 28
End: 2023-12-13

## 2023-12-13 NOTE — TELEPHONE ENCOUNTER
----- Message from Alexandra Waters MD sent at 12/10/2023  3:55 PM EST -----  Notify pt-   Results reviewed. TSH slightly decreased at 0.376/free T4 normal  CMP okay overall  FLP okay, except TC mildly elevated at 220 and LDL mildly elevated at 141-continue to work on diet, exercise, and weight loss  CBC okay, except WBC elevated at 14.3-most likely related to current steroid use-please make sure patient is not ill otherwise currently  Let me know and follow-up as planned.   ES

## 2024-07-11 ENCOUNTER — LAB (OUTPATIENT)
Dept: LAB | Age: 29
End: 2024-07-11

## 2024-07-11 LAB — HCG INTACT+B SERPL-ACNC: ABNORMAL MIU/ML (ref 0–5)

## 2024-07-13 ENCOUNTER — LAB (OUTPATIENT)
Dept: LAB | Age: 29
End: 2024-07-13

## 2024-07-13 LAB — HCG INTACT+B SERPL-ACNC: ABNORMAL MIU/ML (ref 0–5)

## 2024-08-05 ENCOUNTER — LAB (OUTPATIENT)
Dept: LAB | Age: 29
End: 2024-08-05

## 2024-08-07 LAB
C. TRACHOMATIS DNA,THIN PREP: NEGATIVE
N. GONORRHOEAE DNA, THIN PREP: NEGATIVE
SOURCE: NORMAL
SOURCE: NORMAL
TRICHOMONAS VAGINALI, MOLECULAR: NEGATIVE

## 2024-08-12 LAB — CYTOLOGY THIN PREP PAP: NORMAL

## 2024-09-04 ENCOUNTER — LAB (OUTPATIENT)
Dept: LAB | Age: 29
End: 2024-09-04

## 2024-09-04 LAB
ABO: NORMAL
ANTIBODY SCREEN: NORMAL
DEPRECATED RDW RBC AUTO: 46.6 FL (ref 35–45)
ERYTHROCYTE [DISTWIDTH] IN BLOOD BY AUTOMATED COUNT: 15.5 % (ref 11.5–14.5)
HBV SURFACE AG SERPL QL IA: NEGATIVE
HCT VFR BLD AUTO: 41.6 % (ref 37–47)
HCV IGG SERPL QL IA: NEGATIVE
HGB BLD-MCNC: 14 GM/DL (ref 12–16)
MCH RBC QN AUTO: 27.7 PG (ref 26–33)
MCHC RBC AUTO-ENTMCNC: 33.7 GM/DL (ref 32.2–35.5)
MCV RBC AUTO: 82.4 FL (ref 81–99)
PLATELET # BLD AUTO: 227 THOU/MM3 (ref 130–400)
PMV BLD AUTO: 11.3 FL (ref 9.4–12.4)
RBC # BLD AUTO: 5.05 MILL/MM3 (ref 4.2–5.4)
RH FACTOR: NORMAL
RUBV IGG SERPL IA-ACNC: 382 IU/ML
WBC # BLD AUTO: 9.4 THOU/MM3 (ref 4.8–10.8)

## 2024-09-05 LAB
HIV 1+2 AB+HIV1 P24 AG SERPL QL IA: NORMAL
RPR SER QL: NONREACTIVE

## 2024-09-06 LAB
BACTERIA UR CULT: ABNORMAL
ORGANISM: ABNORMAL

## 2024-12-12 ENCOUNTER — LAB (OUTPATIENT)
Dept: LAB | Age: 29
End: 2024-12-12

## 2024-12-12 LAB
DEPRECATED RDW RBC AUTO: 40.2 FL (ref 35–45)
ERYTHROCYTE [DISTWIDTH] IN BLOOD BY AUTOMATED COUNT: 13.2 % (ref 11.5–14.5)
GLUCOSE SERPL-MCNC: 114 MG/DL (ref 69–140)
HCT VFR BLD AUTO: 37.8 % (ref 37–47)
HGB BLD-MCNC: 12.6 GM/DL (ref 12–16)
MCH RBC QN AUTO: 28.1 PG (ref 26–33)
MCHC RBC AUTO-ENTMCNC: 33.3 GM/DL (ref 32.2–35.5)
MCV RBC AUTO: 84.2 FL (ref 81–99)
PLATELET # BLD AUTO: 218 THOU/MM3 (ref 130–400)
PMV BLD AUTO: 11.3 FL (ref 9.4–12.4)
RBC # BLD AUTO: 4.49 MILL/MM3 (ref 4.2–5.4)
WBC # BLD AUTO: 9.7 THOU/MM3 (ref 4.8–10.8)

## 2024-12-13 LAB — RPR SER QL: NONREACTIVE

## 2025-02-21 ENCOUNTER — LAB (OUTPATIENT)
Dept: LAB | Age: 30
End: 2025-02-21

## 2025-02-21 LAB
ALBUMIN SERPL BCG-MCNC: 3.4 G/DL (ref 3.5–5.1)
ALP SERPL-CCNC: 148 U/L (ref 38–126)
ALT SERPL W/O P-5'-P-CCNC: 12 U/L (ref 11–66)
ANION GAP SERPL CALC-SCNC: 17 MEQ/L (ref 8–16)
AST SERPL-CCNC: 27 U/L (ref 5–40)
BASOPHILS ABSOLUTE: 0.1 THOU/MM3 (ref 0–0.1)
BASOPHILS NFR BLD AUTO: 0.5 %
BILIRUB SERPL-MCNC: 0.3 MG/DL (ref 0.3–1.2)
BUN SERPL-MCNC: 10 MG/DL (ref 7–22)
CALCIUM SERPL-MCNC: 8.4 MG/DL (ref 8.2–9.6)
CHLORIDE SERPL-SCNC: 101 MEQ/L (ref 98–111)
CO2 SERPL-SCNC: 19 MEQ/L (ref 23–33)
CREAT SERPL-MCNC: 0.7 MG/DL (ref 0.4–1.2)
CREAT UR-MCNC: 204.5 MG/DL
DEPRECATED RDW RBC AUTO: 39.8 FL (ref 35–45)
EOSINOPHIL NFR BLD AUTO: 1.3 %
EOSINOPHILS ABSOLUTE: 0.1 THOU/MM3 (ref 0–0.4)
ERYTHROCYTE [DISTWIDTH] IN BLOOD BY AUTOMATED COUNT: 14.4 % (ref 11.5–14.5)
GFR SERPL CREATININE-BSD FRML MDRD: > 90 ML/MIN/1.73M2
GLUCOSE SERPL-MCNC: 78 MG/DL (ref 70–108)
HCT VFR BLD AUTO: 35.5 % (ref 37–47)
HGB BLD-MCNC: 11.3 GM/DL (ref 12–16)
IMM GRANULOCYTES # BLD AUTO: 0.12 THOU/MM3 (ref 0–0.07)
IMM GRANULOCYTES NFR BLD AUTO: 1.1 %
LDH SERPL L TO P-CCNC: 264 U/L (ref 100–190)
LYMPHOCYTES ABSOLUTE: 2.5 THOU/MM3 (ref 1–4.8)
LYMPHOCYTES NFR BLD AUTO: 23.3 %
MCH RBC QN AUTO: 24.7 PG (ref 26–33)
MCHC RBC AUTO-ENTMCNC: 31.8 GM/DL (ref 32.2–35.5)
MCV RBC AUTO: 77.5 FL (ref 81–99)
MONOCYTES ABSOLUTE: 0.7 THOU/MM3 (ref 0.4–1.3)
MONOCYTES NFR BLD AUTO: 6.4 %
NEUTROPHILS ABSOLUTE: 7.2 THOU/MM3 (ref 1.8–7.7)
NEUTROPHILS NFR BLD AUTO: 67.4 %
NRBC BLD AUTO-RTO: 0 /100 WBC
PLATELET # BLD AUTO: 190 THOU/MM3 (ref 130–400)
PMV BLD AUTO: 11 FL (ref 9.4–12.4)
POTASSIUM SERPL-SCNC: 4.6 MEQ/L (ref 3.5–5.2)
PROT SERPL-MCNC: 6.3 G/DL (ref 6.1–8)
PROT UR-MCNC: 24.8 MG/DL
PROT/CREAT 24H UR: 0.12 MG/G{CREAT}
RBC # BLD AUTO: 4.58 MILL/MM3 (ref 4.2–5.4)
SODIUM SERPL-SCNC: 137 MEQ/L (ref 135–145)
WBC # BLD AUTO: 10.7 THOU/MM3 (ref 4.8–10.8)

## 2025-02-24 ENCOUNTER — ANESTHESIA (OUTPATIENT)
Dept: LABOR AND DELIVERY | Age: 30
End: 2025-02-24
Payer: COMMERCIAL

## 2025-02-24 ENCOUNTER — HOSPITAL ENCOUNTER (INPATIENT)
Age: 30
LOS: 2 days | Discharge: HOME | End: 2025-02-26
Attending: STUDENT IN AN ORGANIZED HEALTH CARE EDUCATION/TRAINING PROGRAM | Admitting: STUDENT IN AN ORGANIZED HEALTH CARE EDUCATION/TRAINING PROGRAM
Payer: COMMERCIAL

## 2025-02-24 ENCOUNTER — ANESTHESIA EVENT (OUTPATIENT)
Dept: LABOR AND DELIVERY | Age: 30
End: 2025-02-24
Payer: COMMERCIAL

## 2025-02-24 LAB
ABO GROUP BLD: NORMAL
AMPHETAMINES UR QL SCN: NEGATIVE
BARBITURATES UR QL SCN: NEGATIVE
BASOPHILS ABSOLUTE: 0 THOU/MM3 (ref 0–0.1)
BASOPHILS NFR BLD AUTO: 0.4 %
BENZODIAZ UR QL SCN: NEGATIVE
BZE UR QL SCN: NEGATIVE
CANNABINOIDS UR QL SCN: NEGATIVE
DEPRECATED RDW RBC AUTO: 39.6 FL (ref 35–45)
EOSINOPHIL NFR BLD AUTO: 1.2 %
EOSINOPHILS ABSOLUTE: 0.1 THOU/MM3 (ref 0–0.4)
ERYTHROCYTE [DISTWIDTH] IN BLOOD BY AUTOMATED COUNT: 14.6 % (ref 11.5–14.5)
FENTANYL: NEGATIVE
HCT VFR BLD AUTO: 35.1 % (ref 37–47)
HGB BLD-MCNC: 11.5 GM/DL (ref 12–16)
IAT IGG-SP REAG SERPL QL: NORMAL
IMM GRANULOCYTES # BLD AUTO: 0.1 THOU/MM3 (ref 0–0.07)
IMM GRANULOCYTES NFR BLD AUTO: 0.9 %
LYMPHOCYTES ABSOLUTE: 2.2 THOU/MM3 (ref 1–4.8)
LYMPHOCYTES NFR BLD AUTO: 20.7 %
MCH RBC QN AUTO: 25.2 PG (ref 26–33)
MCHC RBC AUTO-ENTMCNC: 32.8 GM/DL (ref 32.2–35.5)
MCV RBC AUTO: 76.8 FL (ref 81–99)
MONOCYTES ABSOLUTE: 0.6 THOU/MM3 (ref 0.4–1.3)
MONOCYTES NFR BLD AUTO: 5.9 %
NEUTROPHILS ABSOLUTE: 7.6 THOU/MM3 (ref 1.8–7.7)
NEUTROPHILS NFR BLD AUTO: 70.9 %
NRBC BLD AUTO-RTO: 0 /100 WBC
OPIATES UR QL SCN: NEGATIVE
OXYCODONE: NEGATIVE
PCP UR QL SCN: NEGATIVE
PLATELET # BLD AUTO: 216 THOU/MM3 (ref 130–400)
PMV BLD AUTO: 11.6 FL (ref 9.4–12.4)
RBC # BLD AUTO: 4.57 MILL/MM3 (ref 4.2–5.4)
RH BLD: NORMAL
RPR SER QL: NONREACTIVE
WBC # BLD AUTO: 10.7 THOU/MM3 (ref 4.8–10.8)

## 2025-02-24 PROCEDURE — 6360000002 HC RX W HCPCS: Performed by: NURSE ANESTHETIST, CERTIFIED REGISTERED

## 2025-02-24 PROCEDURE — 10907ZC DRAINAGE OF AMNIOTIC FLUID, THERAPEUTIC FROM PRODUCTS OF CONCEPTION, VIA NATURAL OR ARTIFICIAL OPENING: ICD-10-PCS | Performed by: OBSTETRICS & GYNECOLOGY

## 2025-02-24 PROCEDURE — 86592 SYPHILIS TEST NON-TREP QUAL: CPT

## 2025-02-24 PROCEDURE — 1220000001 HC SEMI PRIVATE L&D R&B

## 2025-02-24 PROCEDURE — 0HQ9XZZ REPAIR PERINEUM SKIN, EXTERNAL APPROACH: ICD-10-PCS | Performed by: OBSTETRICS & GYNECOLOGY

## 2025-02-24 PROCEDURE — 86900 BLOOD TYPING SEROLOGIC ABO: CPT

## 2025-02-24 PROCEDURE — 7200000001 HC VAGINAL DELIVERY

## 2025-02-24 PROCEDURE — 80307 DRUG TEST PRSMV CHEM ANLYZR: CPT

## 2025-02-24 PROCEDURE — 6360000002 HC RX W HCPCS: Performed by: STUDENT IN AN ORGANIZED HEALTH CARE EDUCATION/TRAINING PROGRAM

## 2025-02-24 PROCEDURE — 2500000003 HC RX 250 WO HCPCS: Performed by: NURSE ANESTHETIST, CERTIFIED REGISTERED

## 2025-02-24 PROCEDURE — 2580000003 HC RX 258: Performed by: STUDENT IN AN ORGANIZED HEALTH CARE EDUCATION/TRAINING PROGRAM

## 2025-02-24 PROCEDURE — 3700000025 EPIDURAL BLOCK: Performed by: ANESTHESIOLOGY

## 2025-02-24 PROCEDURE — 85025 COMPLETE CBC W/AUTO DIFF WBC: CPT

## 2025-02-24 PROCEDURE — 2500000003 HC RX 250 WO HCPCS

## 2025-02-24 PROCEDURE — 86901 BLOOD TYPING SEROLOGIC RH(D): CPT

## 2025-02-24 PROCEDURE — 86885 COOMBS TEST INDIRECT QUAL: CPT

## 2025-02-24 PROCEDURE — 3E033VJ INTRODUCTION OF OTHER HORMONE INTO PERIPHERAL VEIN, PERCUTANEOUS APPROACH: ICD-10-PCS | Performed by: OBSTETRICS & GYNECOLOGY

## 2025-02-24 RX ORDER — FENTANYL CITRATE 50 UG/ML
50 INJECTION, SOLUTION INTRAMUSCULAR; INTRAVENOUS
Status: DISCONTINUED | OUTPATIENT
Start: 2025-02-24 | End: 2025-02-25 | Stop reason: HOSPADM

## 2025-02-24 RX ORDER — ONDANSETRON 2 MG/ML
8 INJECTION INTRAMUSCULAR; INTRAVENOUS EVERY 6 HOURS PRN
Status: DISCONTINUED | OUTPATIENT
Start: 2025-02-24 | End: 2025-02-24 | Stop reason: SDUPTHER

## 2025-02-24 RX ORDER — DIPHENHYDRAMINE HYDROCHLORIDE 50 MG/ML
25 INJECTION INTRAMUSCULAR; INTRAVENOUS EVERY 4 HOURS PRN
Status: DISCONTINUED | OUTPATIENT
Start: 2025-02-24 | End: 2025-02-25 | Stop reason: HOSPADM

## 2025-02-24 RX ORDER — MORPHINE SULFATE 4 MG/ML
4 INJECTION, SOLUTION INTRAMUSCULAR; INTRAVENOUS
Status: DISCONTINUED | OUTPATIENT
Start: 2025-02-24 | End: 2025-02-25 | Stop reason: HOSPADM

## 2025-02-24 RX ORDER — SODIUM CHLORIDE, SODIUM LACTATE, POTASSIUM CHLORIDE, AND CALCIUM CHLORIDE .6; .31; .03; .02 G/100ML; G/100ML; G/100ML; G/100ML
1000 INJECTION, SOLUTION INTRAVENOUS PRN
Status: DISCONTINUED | OUTPATIENT
Start: 2025-02-24 | End: 2025-02-25 | Stop reason: HOSPADM

## 2025-02-24 RX ORDER — OXYTOCIN/0.9 % SODIUM CHLORIDE 30/500 ML
1-20 PLASTIC BAG, INJECTION (ML) INTRAVENOUS CONTINUOUS
Status: DISCONTINUED | OUTPATIENT
Start: 2025-02-24 | End: 2025-02-25 | Stop reason: HOSPADM

## 2025-02-24 RX ORDER — OXYCODONE HYDROCHLORIDE 5 MG/1
10 TABLET ORAL EVERY 4 HOURS PRN
Status: DISCONTINUED | OUTPATIENT
Start: 2025-02-24 | End: 2025-02-25 | Stop reason: HOSPADM

## 2025-02-24 RX ORDER — EPHEDRINE SULFATE/0.9% NACL/PF 25 MG/5 ML
SYRINGE (ML) INTRAVENOUS
Status: COMPLETED
Start: 2025-02-24 | End: 2025-02-24

## 2025-02-24 RX ORDER — EPHEDRINE SULFATE 5 MG/ML
10 INJECTION INTRAVENOUS
Status: DISCONTINUED | OUTPATIENT
Start: 2025-02-24 | End: 2025-02-25 | Stop reason: HOSPADM

## 2025-02-24 RX ORDER — DIPHENHYDRAMINE HCL 25 MG
25 TABLET ORAL EVERY 4 HOURS PRN
Status: DISCONTINUED | OUTPATIENT
Start: 2025-02-24 | End: 2025-02-25 | Stop reason: HOSPADM

## 2025-02-24 RX ORDER — SODIUM CHLORIDE, SODIUM LACTATE, POTASSIUM CHLORIDE, AND CALCIUM CHLORIDE .6; .31; .03; .02 G/100ML; G/100ML; G/100ML; G/100ML
500 INJECTION, SOLUTION INTRAVENOUS PRN
Status: DISCONTINUED | OUTPATIENT
Start: 2025-02-24 | End: 2025-02-25 | Stop reason: HOSPADM

## 2025-02-24 RX ORDER — EPHEDRINE SULFATE 5 MG/ML
5 INJECTION INTRAVENOUS PRN
Status: DISCONTINUED | OUTPATIENT
Start: 2025-02-25 | End: 2025-02-25 | Stop reason: HOSPADM

## 2025-02-24 RX ORDER — LIDOCAINE HYDROCHLORIDE AND EPINEPHRINE BITARTRATE 20; .01 MG/ML; MG/ML
INJECTION, SOLUTION SUBCUTANEOUS
Status: DISCONTINUED | OUTPATIENT
Start: 2025-02-24 | End: 2025-02-24 | Stop reason: SDUPTHER

## 2025-02-24 RX ORDER — MISOPROSTOL 200 UG/1
TABLET ORAL
Status: DISCONTINUED
Start: 2025-02-24 | End: 2025-02-25 | Stop reason: WASHOUT

## 2025-02-24 RX ORDER — OXYTOCIN/0.9 % SODIUM CHLORIDE 30/500 ML
87.3 PLASTIC BAG, INJECTION (ML) INTRAVENOUS PRN
Status: DISCONTINUED | OUTPATIENT
Start: 2025-02-24 | End: 2025-02-25

## 2025-02-24 RX ORDER — TERBUTALINE SULFATE 1 MG/ML
0.25 INJECTION SUBCUTANEOUS
Status: DISCONTINUED | OUTPATIENT
Start: 2025-02-24 | End: 2025-02-25 | Stop reason: HOSPADM

## 2025-02-24 RX ORDER — ACETAMINOPHEN 325 MG/1
650 TABLET ORAL EVERY 4 HOURS PRN
Status: DISCONTINUED | OUTPATIENT
Start: 2025-02-24 | End: 2025-02-25 | Stop reason: HOSPADM

## 2025-02-24 RX ORDER — CARBOPROST TROMETHAMINE 250 UG/ML
250 INJECTION, SOLUTION INTRAMUSCULAR PRN
Status: DISCONTINUED | OUTPATIENT
Start: 2025-02-24 | End: 2025-02-25 | Stop reason: HOSPADM

## 2025-02-24 RX ORDER — TRANEXAMIC ACID 10 MG/ML
1000 INJECTION, SOLUTION INTRAVENOUS
Status: DISCONTINUED | OUTPATIENT
Start: 2025-02-24 | End: 2025-02-25 | Stop reason: HOSPADM

## 2025-02-24 RX ORDER — MORPHINE SULFATE 2 MG/ML
2 INJECTION, SOLUTION INTRAMUSCULAR; INTRAVENOUS
Status: DISCONTINUED | OUTPATIENT
Start: 2025-02-24 | End: 2025-02-25 | Stop reason: HOSPADM

## 2025-02-24 RX ORDER — SEVOFLURANE 250 ML/250ML
1 LIQUID RESPIRATORY (INHALATION) CONTINUOUS PRN
Status: DISCONTINUED | OUTPATIENT
Start: 2025-02-24 | End: 2025-02-25 | Stop reason: HOSPADM

## 2025-02-24 RX ORDER — OXYCODONE HYDROCHLORIDE 5 MG/1
5 TABLET ORAL EVERY 4 HOURS PRN
Status: DISCONTINUED | OUTPATIENT
Start: 2025-02-24 | End: 2025-02-25 | Stop reason: HOSPADM

## 2025-02-24 RX ORDER — NALOXONE HYDROCHLORIDE 0.4 MG/ML
INJECTION, SOLUTION INTRAMUSCULAR; INTRAVENOUS; SUBCUTANEOUS PRN
Status: DISCONTINUED | OUTPATIENT
Start: 2025-02-24 | End: 2025-02-25 | Stop reason: HOSPADM

## 2025-02-24 RX ORDER — METHYLERGONOVINE MALEATE 0.2 MG/ML
200 INJECTION INTRAVENOUS PRN
Status: DISCONTINUED | OUTPATIENT
Start: 2025-02-24 | End: 2025-02-25 | Stop reason: HOSPADM

## 2025-02-24 RX ORDER — SODIUM CHLORIDE, SODIUM LACTATE, POTASSIUM CHLORIDE, CALCIUM CHLORIDE 600; 310; 30; 20 MG/100ML; MG/100ML; MG/100ML; MG/100ML
INJECTION, SOLUTION INTRAVENOUS CONTINUOUS
Status: DISCONTINUED | OUTPATIENT
Start: 2025-02-24 | End: 2025-02-25 | Stop reason: HOSPADM

## 2025-02-24 RX ORDER — MISOPROSTOL 200 UG/1
400 TABLET ORAL PRN
Status: DISCONTINUED | OUTPATIENT
Start: 2025-02-24 | End: 2025-02-25

## 2025-02-24 RX ORDER — LIDOCAINE HYDROCHLORIDE 10 MG/ML
30 INJECTION, SOLUTION INFILTRATION; PERINEURAL PRN
Status: DISCONTINUED | OUTPATIENT
Start: 2025-02-24 | End: 2025-02-25 | Stop reason: HOSPADM

## 2025-02-24 RX ORDER — ONDANSETRON 4 MG/1
4 TABLET, ORALLY DISINTEGRATING ORAL EVERY 6 HOURS PRN
Status: DISCONTINUED | OUTPATIENT
Start: 2025-02-24 | End: 2025-02-25

## 2025-02-24 RX ORDER — ONDANSETRON 2 MG/ML
4 INJECTION INTRAMUSCULAR; INTRAVENOUS EVERY 6 HOURS PRN
Status: DISCONTINUED | OUTPATIENT
Start: 2025-02-24 | End: 2025-02-25 | Stop reason: HOSPADM

## 2025-02-24 RX ADMIN — ONDANSETRON 4 MG: 2 INJECTION, SOLUTION INTRAMUSCULAR; INTRAVENOUS at 17:24

## 2025-02-24 RX ADMIN — Medication 10 MG: at 16:50

## 2025-02-24 RX ADMIN — LIDOCAINE HYDROCHLORIDE,EPINEPHRINE BITARTRATE 3 ML: 20; .01 INJECTION, SOLUTION INFILTRATION; PERINEURAL at 16:23

## 2025-02-24 RX ADMIN — Medication 1 MILLI-UNITS/MIN: at 10:41

## 2025-02-24 RX ADMIN — Medication 166.7 ML: at 23:46

## 2025-02-24 RX ADMIN — Medication 18 ML/HR: at 16:28

## 2025-02-24 RX ADMIN — SODIUM CHLORIDE, POTASSIUM CHLORIDE, SODIUM LACTATE AND CALCIUM CHLORIDE: 600; 310; 30; 20 INJECTION, SOLUTION INTRAVENOUS at 10:37

## 2025-02-24 NOTE — H&P
[facility]  History and Physical Update    Pt Name: Margie Pandey  MRN: [id number]  YOB: 1995  Date of evaluation: 2025    [] I have examined the patient and reviewed the H&P/Consult and there are no changes to the patient or plans.    [x] I have examined the patient and reviewed the H&P/Consult and have noted the following changes:   30yo  @38w3d admitted from the office for IOL due to gHTN. BP mildly elevated in the office late last week and again today. Had normal Pre-E labs on Friday and denies any Pre-E symptoms. Baby measured 0gi28cu (91%ile) at 36wks. We had the discussion of vaginal delivery attempt vs  and the potential risks and benefits. Pt's last baby was 9lb5oz and she delivered that baby without any complications. She wishes to attempt to deliver vaginally.  FHTs cat 1  Irregular contractions on TOCO  Cervix very posterior and unable to assess dilation but was 1cm a couple of weeks ago.   Will induce with pitocin and AROM when able.      Discussion with the patient and/ or family for proposed care, treatment, services; benefits, risks, side effects; likelihood of achieving goals and potential problems that may occur during recuperation was had and all questions were answered.  Discussion with the patient and/ or family of reasonable alternatives to the proposed care, treatment, services and the discussion of the risks, benefits, side effects related to the alternatives and the risk related to not receiving the proposed care treatment services was also had and all questions were answered.    If this is for an elective surgical procedure then The patient was counseled at length about the risks of haydee Covid-19 during their perioperative period and any recovery window from their procedure.  The patient was made aware that haydee Covid-19  may worsen their prognosis for recovering from their procedure  and lend to a higher morbidity and/or mortality

## 2025-02-24 NOTE — ANESTHESIA PRE PROCEDURE
Reason For Visit  RFV - SW: Care Team:   Primary Care Giver: Odessa Tavarez. Phone: [phone].   Address: [address] [place] [address].   Diagnosis: hypertension, heart disease.   MD: Dr. Schneider.   Power of : Odessa Tavarez. Phone: [phone].   NICOLASA WHITE is a(n) 89 male patient seen today for an initial home visit.   Chief Complaint:   Pain: at times however not today.   Goals of Care: to secure a caregiver for the home.   Information given by: self, his adult child and caregiver.   Accompanied By: self, his adult child and caregiver.   Advanced Directives: The patient's Advance Directives have not been completed.   Referred By: [facility]      History of Present Illness  Coping Status: difficulty coping. Patient daughter works over night and then cares for the patient other times. She is overwhelmed and very tired.   Emotional Status: depressed.   Mental Status: alert.   Support Systems: inadequate. Patient has a friend that is staying with him to help with patient care in the home. They have been trying to get a caregiver from the Dept on [facility] since November 2017 and have been unsuccessful.      Financial  Financial Status: Adequate.   Private Insurance: [facility] [employer].   Group Number: [id number].   Policy Number: [id number].   Comments: Patient is living in his daughter's home.      Goals  Patient's/Family Goals: To secure a caregiver in his home from the state.   Assessment: Patient is a  and has 4 sons and 1 daughter. The 4 sons all have a disability and are not able to assist with patient care needs. Patient daughter lives in the first floor of her building and assist with patient care when she is not working.    The daughter is working overnight with young children and she returns home very tired and has to care for her father. Currently an elderly friend of patient's has moved into the patient apartment in order to assist the daughter with patient care. The friend  however is in her 80's and has health issues herself.    Daughter discussed how she has tried to secure a caregiver from [facility] [employer] since November 2017. She said they have approved the patient however a worker has never started. The Dept on Aging provided the daughter with the name and number of an agency that is supposed to provide a caregiver for the patient however the daughter has never called the agency. MSW strongly encouraged the daughter to call the agency to see if this is the step that they are missing in securing a caregiver for the patient.    MSW called the Dept on [employer] to request the services begin for patient in addition to daughter calling the agency. When speaking to the Dept [employer] MSW informed them that the  has not been returning the daughter's calls and they are very frustrated since they have been waiting since November 2017 for a caregiver to start.    MSW provided supportive counseling to the daughter for caregiver stress.    Time spent with patient at visit: 60 minutes  Greater than 50% of time spent was counseling and/or coordinating care  .      Plan  Needs: Supportive Counseling. To provide supportive counseling to the daughter for caregiver stress.   Referral: [facility] [employer]. Assist with securing a state caregiver for the patient.   Palliative Care : Tiffany Brice LCSW. Phone Number: [phone].      Signatures   Electronically signed by : TIFFANY BRICE LCSW; Feb 7 2018  5:29PM CST           NPO Status:                                                                                 BMI:   Wt Readings from Last 3 Encounters:   12/13/23 102.9 kg (226 lb 12.8 oz)   12/11/23 102.1 kg (225 lb)   12/04/23 99.8 kg (220 lb)     There is no height or weight on file to calculate BMI.    CBC:   Lab Results   Component Value Date/Time    WBC 10.7 02/24/2025 10:30 AM    RBC 4.57 02/24/2025 10:30 AM    HGB 11.5 02/24/2025 10:30 AM    HCT 35.1 02/24/2025 10:30 AM    MCV 76.8 02/24/2025 10:30 AM    RDW 13.4 04/13/2016 07:24 AM     02/24/2025 10:30 AM       CMP:   Lab Results   Component Value Date/Time     02/21/2025 08:50 AM    K 4.6 02/21/2025 08:50 AM    K 4.1 04/05/2021 09:54 PM     02/21/2025 08:50 AM    CO2 19 02/21/2025 08:50 AM    BUN 10 02/21/2025 08:50 AM    CREATININE 0.7 02/21/2025 08:50 AM    LABGLOM > 90 02/21/2025 08:50 AM    LABGLOM >60 12/09/2023 10:41 AM    GLUCOSE 78 02/21/2025 08:50 AM    CALCIUM 8.4 02/21/2025 08:50 AM    BILITOT 0.3 02/21/2025 08:50 AM    ALKPHOS 148 02/21/2025 08:50 AM    AST 27 02/21/2025 08:50 AM    ALT 12 02/21/2025 08:50 AM       POC Tests: No results for input(s): \"POCGLU\", \"POCNA\", \"POCK\", \"POCCL\", \"POCBUN\", \"POCHEMO\", \"POCHCT\" in the last 72 hours.    Coags:   Lab Results   Component Value Date/Time    INR 1.07 04/13/2016 07:25 AM    APTT 30.8 04/13/2016 07:25 AM       HCG (If Applicable):   Lab Results   Component Value Date    PREGTESTUR NEGATIVE 06/21/2017    PREGSERUM NEGATIVE 04/04/2021        ABGs: No results found for: \"PHART\", \"PO2ART\", \"RFD4NAC\", \"HPJ2CNT\", \"BEART\", \"L4WPNNUA\"     Type & Screen (If Applicable):  Lab Results   Component Value Date    LABANTI NEG 02/24/2025       Drug/Infectious Status (If Applicable):  Lab Results   Component Value Date/Time    HEPCAB Negative 09/04/2024 05:11 PM       COVID-19 Screening (If Applicable):   Lab Results   Component Value Date/Time    COVID19 NOT DETECTED 04/06/2021 08:00 AM

## 2025-02-24 NOTE — PROGRESS NOTES
Patient comfortable with epidural  Called to unit to AROM per Dr. Angelo request.      SVE: 5/70/-2  FHT's: 130 bpm, CAT I fetal tracing, reactive, reassuring, no decels, no variables, + accels  CTX: q 2-3 minutes     Plan:  Discussed plan for AROM with patient.  Patient agreeable to plan of care.   AROM performed at approximately 1707pm clear fluid, moderate amount

## 2025-02-24 NOTE — ANESTHESIA PROCEDURE NOTES
Epidural Block    Patient location during procedure: OB  Start time: 2/24/2025 4:05 PM  Reason for block: labor epidural  Staffing  Performed: resident/CRNA   Resident/CRNA: Meghan Velazco APRN - CRNA  Performed by: Meghan Velazco APRN - CRNA  Authorized by: Nakul Jean DO    Epidural  Patient position: sitting  Prep: ChloraPrep  Patient monitoring: frequent blood pressure checks and continuous pulse ox  Approach: midline  Location: L2-3  Injection technique: DREW air  Provider prep: mask and sterile gloves  Needle  Needle type: Tuohy   Needle gauge: 18 G  Needle length: 3.5 in  Needle insertion depth: 8 cm  Catheter type: stylet  Catheter size: 20 G  Catheter at skin depth: 10 cm  Test dose: negativeCatheter Secured: tape and tegaderm  Assessment  Sensory level: T6  Hemodynamics: stable  Attempts: 1  Outcomes: uncomplicated and patient tolerated procedure well  Additional Notes  Wilman procedure well vss  Preanesthetic Checklist  Completed: patient identified, IV checked, site marked, risks and benefits discussed, surgical/procedural consents, equipment checked, pre-op evaluation, timeout performed, anesthesia consent given, oxygen available, monitors applied/VS acknowledged, fire risk safety assessment completed and verbalized and blood product R/B/A discussed and consented

## 2025-02-24 NOTE — FLOWSHEET NOTE
Patient arrived to the unit from office appointment, Dr. Angelo called prior to her arrival with orders. Induction for gestational HTN. Patient is , 38w3d, GBS negative.

## 2025-02-25 LAB — HGB BLD-MCNC: 10.8 GM/DL (ref 12–16)

## 2025-02-25 PROCEDURE — 6370000000 HC RX 637 (ALT 250 FOR IP): Performed by: OBSTETRICS & GYNECOLOGY

## 2025-02-25 PROCEDURE — 85018 HEMOGLOBIN: CPT

## 2025-02-25 PROCEDURE — 1200000000 HC SEMI PRIVATE

## 2025-02-25 PROCEDURE — 36415 COLL VENOUS BLD VENIPUNCTURE: CPT

## 2025-02-25 RX ORDER — OXYCODONE HYDROCHLORIDE 5 MG/1
5 TABLET ORAL EVERY 6 HOURS PRN
Status: DISCONTINUED | OUTPATIENT
Start: 2025-02-25 | End: 2025-02-26 | Stop reason: HOSPADM

## 2025-02-25 RX ORDER — FERROUS SULFATE 325(65) MG
325 TABLET ORAL
Status: DISCONTINUED | OUTPATIENT
Start: 2025-02-25 | End: 2025-02-26 | Stop reason: HOSPADM

## 2025-02-25 RX ORDER — ONDANSETRON 4 MG/1
4 TABLET, ORALLY DISINTEGRATING ORAL EVERY 6 HOURS PRN
Status: DISCONTINUED | OUTPATIENT
Start: 2025-02-25 | End: 2025-02-26 | Stop reason: HOSPADM

## 2025-02-25 RX ORDER — IBUPROFEN 800 MG/1
800 TABLET, FILM COATED ORAL EVERY 8 HOURS PRN
COMMUNITY
Start: 2025-02-25

## 2025-02-25 RX ORDER — SODIUM CHLORIDE 0.9 % (FLUSH) 0.9 %
5-40 SYRINGE (ML) INJECTION EVERY 12 HOURS SCHEDULED
Status: DISCONTINUED | OUTPATIENT
Start: 2025-02-25 | End: 2025-02-26 | Stop reason: HOSPADM

## 2025-02-25 RX ORDER — IBUPROFEN 800 MG/1
800 TABLET, FILM COATED ORAL EVERY 8 HOURS
Status: DISCONTINUED | OUTPATIENT
Start: 2025-02-25 | End: 2025-02-26 | Stop reason: HOSPADM

## 2025-02-25 RX ORDER — MODIFIED LANOLIN
OINTMENT (GRAM) TOPICAL PRN
Status: DISCONTINUED | OUTPATIENT
Start: 2025-02-25 | End: 2025-02-26 | Stop reason: HOSPADM

## 2025-02-25 RX ORDER — TRANEXAMIC ACID 10 MG/ML
1000 INJECTION, SOLUTION INTRAVENOUS
Status: ACTIVE | OUTPATIENT
Start: 2025-02-25 | End: 2025-02-26

## 2025-02-25 RX ORDER — METHYLERGONOVINE MALEATE 0.2 MG/ML
200 INJECTION INTRAVENOUS PRN
Status: DISCONTINUED | OUTPATIENT
Start: 2025-02-25 | End: 2025-02-26 | Stop reason: HOSPADM

## 2025-02-25 RX ORDER — SODIUM CHLORIDE, SODIUM LACTATE, POTASSIUM CHLORIDE, CALCIUM CHLORIDE 600; 310; 30; 20 MG/100ML; MG/100ML; MG/100ML; MG/100ML
INJECTION, SOLUTION INTRAVENOUS CONTINUOUS
Status: DISCONTINUED | OUTPATIENT
Start: 2025-02-25 | End: 2025-02-26 | Stop reason: HOSPADM

## 2025-02-25 RX ORDER — DOCUSATE SODIUM 100 MG/1
100 CAPSULE, LIQUID FILLED ORAL 2 TIMES DAILY PRN
Status: DISCONTINUED | OUTPATIENT
Start: 2025-02-25 | End: 2025-02-26 | Stop reason: HOSPADM

## 2025-02-25 RX ORDER — SODIUM CHLORIDE 0.9 % (FLUSH) 0.9 %
5-40 SYRINGE (ML) INJECTION PRN
Status: DISCONTINUED | OUTPATIENT
Start: 2025-02-25 | End: 2025-02-26 | Stop reason: HOSPADM

## 2025-02-25 RX ORDER — MISOPROSTOL 200 UG/1
800 TABLET ORAL PRN
Status: DISCONTINUED | OUTPATIENT
Start: 2025-02-25 | End: 2025-02-26 | Stop reason: HOSPADM

## 2025-02-25 RX ORDER — SODIUM CHLORIDE 9 MG/ML
INJECTION, SOLUTION INTRAVENOUS PRN
Status: DISCONTINUED | OUTPATIENT
Start: 2025-02-25 | End: 2025-02-26 | Stop reason: HOSPADM

## 2025-02-25 RX ORDER — CARBOPROST TROMETHAMINE 250 UG/ML
250 INJECTION, SOLUTION INTRAMUSCULAR PRN
Status: DISCONTINUED | OUTPATIENT
Start: 2025-02-25 | End: 2025-02-26 | Stop reason: HOSPADM

## 2025-02-25 RX ORDER — BISACODYL 10 MG
10 SUPPOSITORY, RECTAL RECTAL DAILY PRN
Status: DISCONTINUED | OUTPATIENT
Start: 2025-02-25 | End: 2025-02-26 | Stop reason: HOSPADM

## 2025-02-25 RX ORDER — ONDANSETRON 2 MG/ML
4 INJECTION INTRAMUSCULAR; INTRAVENOUS EVERY 6 HOURS PRN
Status: DISCONTINUED | OUTPATIENT
Start: 2025-02-25 | End: 2025-02-26 | Stop reason: HOSPADM

## 2025-02-25 RX ORDER — ACETAMINOPHEN 500 MG
1000 TABLET ORAL EVERY 8 HOURS
Status: DISCONTINUED | OUTPATIENT
Start: 2025-02-25 | End: 2025-02-26 | Stop reason: HOSPADM

## 2025-02-25 RX ADMIN — DOCUSATE SODIUM 100 MG: 100 CAPSULE, LIQUID FILLED ORAL at 08:05

## 2025-02-25 RX ADMIN — IBUPROFEN 800 MG: 800 TABLET, FILM COATED ORAL at 13:42

## 2025-02-25 RX ADMIN — ACETAMINOPHEN 1000 MG: 500 TABLET ORAL at 07:16

## 2025-02-25 RX ADMIN — ACETAMINOPHEN 1000 MG: 500 TABLET ORAL at 16:15

## 2025-02-25 RX ADMIN — IBUPROFEN 800 MG: 800 TABLET, FILM COATED ORAL at 00:54

## 2025-02-25 ASSESSMENT — PAIN DESCRIPTION - LOCATION
LOCATION: ABDOMEN

## 2025-02-25 ASSESSMENT — PAIN DESCRIPTION - DESCRIPTORS
DESCRIPTORS: CRAMPING;DISCOMFORT
DESCRIPTORS: CRAMPING;DISCOMFORT
DESCRIPTORS: DISCOMFORT;CRAMPING

## 2025-02-25 ASSESSMENT — PAIN - FUNCTIONAL ASSESSMENT
PAIN_FUNCTIONAL_ASSESSMENT: ACTIVITIES ARE NOT PREVENTED

## 2025-02-25 ASSESSMENT — PAIN SCALES - GENERAL
PAINLEVEL_OUTOF10: 3
PAINLEVEL_OUTOF10: 3
PAINLEVEL_OUTOF10: 4

## 2025-02-25 NOTE — FLOWSHEET NOTE
Dr. Nelson into room to introduce herself. SVE by RN. No change. Amnioinfusion orders received if the variable decelerations worsen after other interventions. MD to go home. Will call when needed for delivery.

## 2025-02-25 NOTE — FLOWSHEET NOTE
Pt up to BR to void. Ambulated well with stand by assistance. Bleeding WNL. Pericare completed. Tucks, dermaplast, pad, underwear and gown applied. Pt to wheelchair. Infant in arms. Pt transferred to MB in stable condition. Bedside report given to Jessica ESTRADA RN on MB.

## 2025-02-25 NOTE — PROGRESS NOTES
Department of Obstetrics and Gynecology  Labor and Delivery  Attending Post Partum Progress Note    PPD #1    SUBJECTIVE: Feeling well. No complaints. Bleeding wnl. Voiding spontaneously. Ambulating without lightheadedness.     OBJECTIVE:     Vitals:  /76   Pulse 72   Temp 98.1 °F (36.7 °C) (Oral)   Resp 16   SpO2 99%   Breastfeeding Unknown     Uterus:  normal size, well involuted, firm, non-tender    DATA:    Hemoglobin:   Lab Results   Component Value Date/Time    HGB 10.8 02/25/2025 07:00 AM       ASSESSMENT & PLAN:    Doing well.  BP mostly wnl with a few random elevated BP  Dispo: Plan for discharge home on day #2      Chanelle Angelo, DO 2/25/2025

## 2025-02-25 NOTE — LACTATION NOTE
Provided and discussed breastfeeding booklet with pt.  Discussed pts. Concerns with low milk supply.  Pt. Stated she has a breast pump for home use.  Encouraged pt. To call out for assistance if needed.

## 2025-02-25 NOTE — FLOWSHEET NOTE
Dr Nelson at [facility]. Updated on pt's status. Cervical exam discussed. RN just checked cervix and repositioned. FHR since last reposition reviewed. States she will be in to introduce self. Continue with labor.

## 2025-02-25 NOTE — DISCHARGE SUMMARY
Obstetric Discharge Summary      Pt Name: Margie Pandey  MRN: [id number] Acct #: [id number]  YOB: 1995        Admitting Diagnosis  IUP @38w3d  Gestational HTN    OB History          4    Para   3    Term   3       0    AB   1    Living   3         SAB   1    IAB   0    Ectopic   0    Molar   0    Multiple   0    Live Births   3                Reasons for Admission on 2025 10:00 AM  Encounter for induction of labor [Z34.90]  No comment available  Induction of labor      Intrapartum Procedures                          Postpartum/Operative Complications     None     Data  Information for the patient's :  Shwetha Pandey [[id number]]   female   Birth Weight: 3.89 kg (8 lb 9.2 oz)    Discharge Diagnosis     S/p vaginal delivery    Discharge Information  Current Discharge Medication List        START taking these medications    Details   ibuprofen (ADVIL;MOTRIN) 800 MG tablet Take 1 tablet by mouth every 8 hours as needed for Pain           CONTINUE these medications which have NOT CHANGED    Details   sertraline (ZOLOFT) 50 MG tablet Take 1 tablet by mouth daily  Qty: 60 tablet, Refills: 1    Associated Diagnoses: Generalized anxiety disorder with panic attacks           STOP taking these medications       aspirin 81 MG EC tablet Comments:   Reason for Stopping:               No discharge procedures on file.    Vaginal Delivery  Diet regular  Condition Good    Discharge to:  home  Follow up in 5-6 wks.    Date of delivery 25    Shwetha Pandey [[id number]]      Labor Events     Labor: No   Steroids: None  Cervical Ripening Date/Time:      Antibiotics Received during Labor: No  Rupture Date/Time:  25 17:07:00   Rupture Type: AROM, Intact  Fluid Color: Clear  Fluid Odor: None  Fluid Volume: Moderate  Induction: Oxytocin, AROM  Labor Complications: None       Anesthesia    Method: Epidural       Labor Event Times      Labor onset date/time:

## 2025-02-25 NOTE — L&D DELIVERY NOTE
Department of Obstetrics and Gynecology  Spontaneous Vaginal Delivery Note      Pt Name: Margie Pandey  MRN: [id number] Acct #: [id number]  YOB: 1995  Procedure Performed By: Iwona Nelson MD, MD        Pre-operative Diagnosis:  Term pregnancy, Induced labor, Single fetus, and Pregnancy complicated by: gHTN    Post-operative Diagnosis: Same, delivered.    Procedure: spontaneous vaginal delivery    Surgeon:  Iwona Nelson    Information for the patient's :  Shwetha Pandey [[id number]]        Anesthesia:  epidural anesthesia    Estimated blood loss:  200 ml    Complications:  none    Condition:  infant stable to general nursery and mother stable    Delivery Summary:  The patient is a 29 y.o.  at 38w3d who was admitted for gestational hypertension. She received the following interventions: AROM and IV pitocin induction.  The patient progressed well,did receive an epidural, became complete and started to push. She was placed in the dorsal lithotomy position and prepped. She delivered the baby which was then placed on the mother's abdomen. Cord was clamped and cut and infant handed off to the waiting nurse for evaluation. The placenta delivered intact, whole and that the umbilical cord had three vessels noted. The perineum and vagina were explored and  First degree laceration was repaired with Vicryl 3.0.       Vaginal sweep was performed at the conclusion of delivery and all needles were taken off the field. Sponge counts correct      PMH:  Past Medical History:   Diagnosis Date    Mila Rosario.      Concussion 10/27/2015    Hypertension     GHTN G1    Obesity          Iwona Nelson MD

## 2025-02-25 NOTE — ANESTHESIA POSTPROCEDURE EVALUATION
Department of Anesthesiology  Postprocedure Note    Patient: Margie Pandey  MRN: [id number]  YOB: 1995  Date of evaluation: 2/25/2025    Procedure Summary       Date: 02/24/25 Room / Location:     Anesthesia Start: 1604 Anesthesia Stop: 2342    Procedure: Labor Analgesia Diagnosis:     Scheduled Providers:  Responsible Provider: Nakul Jean DO    Anesthesia Type: epidural ASA Status: 2            Anesthesia Type: No value filed.    Elizabeth Phase I: Elizabeth Score: 9    Elizabeth Phase II: Elizabeth Score: 10    Anesthesia Post Evaluation    Patient location during evaluation: floor  Patient participation: complete - patient participated  Level of consciousness: awake and alert  Airway patency: patent  Nausea & Vomiting: no nausea and no vomiting  Cardiovascular status: hemodynamically stable  Respiratory status: acceptable and spontaneous ventilation  Hydration status: euvolemic  Pain management: satisfactory to patient        No notable events documented.

## 2025-02-25 NOTE — FLOWSHEET NOTE
Assisted to side of bed, and to BR voided large amt, small lochia. Aarti care done, ice pack applied. Fundus u1 firm

## 2025-02-26 VITALS
TEMPERATURE: 97.8 F | OXYGEN SATURATION: 99 % | SYSTOLIC BLOOD PRESSURE: 140 MMHG | HEART RATE: 68 BPM | DIASTOLIC BLOOD PRESSURE: 88 MMHG | RESPIRATION RATE: 16 BRPM

## 2025-02-26 PROCEDURE — 6370000000 HC RX 637 (ALT 250 FOR IP): Performed by: OBSTETRICS & GYNECOLOGY

## 2025-02-26 RX ADMIN — DOCUSATE SODIUM 100 MG: 100 CAPSULE, LIQUID FILLED ORAL at 07:50

## 2025-02-26 NOTE — FLOWSHEET NOTE
Post birth warning signs education paper given and reviewed, teaching complete. [place] postpartum depression screening discussed with patient, instructed to contact her healthcare provider if her score is > 10. Patient voiced understanding.  Mother's blood type is B+. Mother did not receive Rhogam.

## 2025-02-26 NOTE — PROGRESS NOTES
Department of Obstetrics and Gynecology  Labor and Delivery  Attending Post Partum Progress Note    PPD #2    SUBJECTIVE: Feeling well. No complaints.    OBJECTIVE:     Vitals:  BP (!) 128/93   Pulse 68   Temp 97.8 °F (36.6 °C) (Oral)   Resp 16   SpO2 99%   Breastfeeding Unknown     Uterus:  normal size, well involuted, firm, non-tender    DATA:    Hemoglobin:   Lab Results   Component Value Date/Time    HGB 10.8 02/25/2025 07:00 AM       ASSESSMENT & PLAN:    Doing well.  BP mostly wnl  Dispo: Discharge home today      Chanelle Angelo,  2/26/2025

## 2025-02-26 NOTE — PLAN OF CARE
Problem: Pain  Goal: Verbalizes/displays adequate comfort level or baseline comfort level  2/25/2025 0850 by Daysi German RN  Outcome: Progressing  Flowsheets (Taken 2/25/2025 0800)  Verbalizes/displays adequate comfort level or baseline comfort level:   Encourage patient to monitor pain and request assistance   Assess pain using appropriate pain scale  Note: Pain goal 4     Problem: Postpartum  Goal: Experiences normal postpartum course  Description:  Postpartum OB-Pregnancy care plan goal which identifies if the mother is experiencing a normal postpartum course  2/25/2025 0850 by Daysi German RN  Outcome: Progressing  Flowsheets (Taken 2/25/2025 0800)  Experiences Normal Postpartum Course:   Monitor maternal vital signs   Assess uterine involution     Problem: Infection - Adult  Goal: Absence of infection at discharge  2/25/2025 0850 by Daysi German RN  Outcome: Progressing  Flowsheets (Taken 2/25/2025 0800)  Absence of infection at discharge:   Assess and monitor for signs and symptoms of infection   Monitor lab/diagnostic results     Problem: Safety - Adult  Goal: Free from fall injury  2/25/2025 0850 by Daysi German RN  Outcome: Progressing  Flowsheets (Taken 2/25/2025 0800)  Free From Fall Injury: Instruct family/caregiver on patient safety     Problem: Discharge Planning  Goal: Discharge to home or other facility with appropriate resources  2/25/2025 0850 by Daysi German RN  Outcome: Progressing  Flowsheets (Taken 2/25/2025 0800)  Discharge to home or other facility with appropriate resources: Identify barriers to discharge with patient and caregiver     Problem: Chronic Conditions and Co-morbidities  Goal: Patient's chronic conditions and co-morbidity symptoms are monitored and maintained or improved  2/25/2025 0850 by Daysi German RN  Outcome: Progressing  Flowsheets (Taken 2/24/2025 1959 by Nerissa Felix, RN)  Care Plan - Patient's Chronic Conditions and Co-Morbidity 
  Problem: Postpartum  Goal: Experiences normal postpartum course  Description:  Postpartum OB-Pregnancy care plan goal which identifies if the mother is experiencing a normal postpartum course  Recent Flowsheet Documentation  Taken 2/26/2025 0730 by Sharmila Mccollum RN  Experiences Normal Postpartum Course:   Monitor maternal vital signs   Assess uterine involution     Problem: Pain  Goal: Verbalizes/displays adequate comfort level or baseline comfort level  Recent Flowsheet Documentation  Taken 2/26/2025 0730 by Sharmila Mccollum RN  Verbalizes/displays adequate comfort level or baseline comfort level:   Encourage patient to monitor pain and request assistance   Administer analgesics based on type and severity of pain and evaluate response     Problem: Infection - Adult  Goal: Absence of infection at discharge  Recent Flowsheet Documentation  Taken 2/26/2025 0730 by Sharmila Mccollum RN  Absence of infection at discharge:   Assess and monitor for signs and symptoms of infection   Monitor all insertion sites i.e., indwelling lines, tubes and drains     Problem: Safety - Adult  Goal: Free from fall injury  Recent Flowsheet Documentation  Taken 2/26/2025 0730 by Sharmila Mccollum RN  Free From Fall Injury:   Instruct family/caregiver on patient safety   Based on caregiver fall risk screen, instruct family/caregiver to ask for assistance with transferring infant if caregiver noted to have fall risk factors     Problem: Discharge Planning  Goal: Discharge to home or other facility with appropriate resources  Recent Flowsheet Documentation  Taken 2/26/2025 0730 by Sharmila Mccollum RN  Discharge to home or other facility with appropriate resources:   Identify barriers to discharge with patient and caregiver   Identify discharge learning needs (meds, wound care, etc)     Problem: Chronic Conditions and Co-morbidities  Goal: Patient's chronic conditions and co-morbidity symptoms are monitored and maintained or 
  Problem: Postpartum  Goal: Experiences normal postpartum course  Description:  Postpartum OB-Pregnancy care plan goal which identifies if the mother is experiencing a normal postpartum course  Recent Flowsheet Documentation  Taken 2/26/2025 0745 by Daysi German RN  Experiences Normal Postpartum Course:   Monitor maternal vital signs   Assess uterine involution  Taken 2/26/2025 0730 by Sharmila Mccollum RN  Experiences Normal Postpartum Course:   Monitor maternal vital signs   Assess uterine involution     Problem: Postpartum  Goal: Incisions, wounds, or drain sites healing without S/S of infection  Recent Flowsheet Documentation  Taken 2/26/2025 0745 by Daysi German RN  Incisions, Wounds, or Drain Sites Healing Without Sign and Symptoms of Infection: TWICE DAILY: Assess and document skin integrity     Problem: Pain  Goal: Verbalizes/displays adequate comfort level or baseline comfort level  Recent Flowsheet Documentation  Taken 2/26/2025 0745 by Daysi German RN  Verbalizes/displays adequate comfort level or baseline comfort level:   Encourage patient to monitor pain and request assistance   Assess pain using appropriate pain scale  Taken 2/26/2025 0730 by Sharmila Mccollum RN  Verbalizes/displays adequate comfort level or baseline comfort level:   Encourage patient to monitor pain and request assistance   Administer analgesics based on type and severity of pain and evaluate response     Problem: Infection - Adult  Goal: Absence of infection at discharge  Recent Flowsheet Documentation  Taken 2/26/2025 0745 by Daysi German RN  Absence of infection at discharge:   Assess and monitor for signs and symptoms of infection   Monitor lab/diagnostic results  Taken 2/26/2025 0730 by Sharmila Mccollum RN  Absence of infection at discharge:   Assess and monitor for signs and symptoms of infection   Monitor all insertion sites i.e., indwelling lines, tubes and drains     Problem: Infection - Adult  Goal: 
  Problem: Vaginal Birth or  Section  Goal: Fetal and maternal status remain reassuring during the birth process  Description:  Birth OB-Pregnancy care plan goal which identifies if the fetal and maternal status remain reassuring during the birth process  2025 by Nerissa Felix RN  Outcome: Progressing  Flowsheets (Taken 2025)  Fetal and Maternal Status Remain Reassuring During the Birth Process:   Monitor vital signs   Monitor uterine activity   Monitor fetal heart rate   Monitor labor progression (Vaginal delivery)     Problem: Pain  Goal: Verbalizes/displays adequate comfort level or baseline comfort level  2025 by Nerissa Felix RN  Outcome: Progressing  Flowsheets (Taken 2025)  Verbalizes/displays adequate comfort level or baseline comfort level:   Assess pain using appropriate pain scale   Encourage patient to monitor pain and request assistance   Administer analgesics based on type and severity of pain and evaluate response   Notify Licensed Independent Practitioner if interventions unsuccessful or patient reports new pain     Problem: Infection - Adult  Goal: Absence of infection during hospitalization  2025 by Nerissa Felix RN  Outcome: Progressing  Flowsheets (Taken 2025)  Absence of infection during hospitalization:   Assess and monitor for signs and symptoms of infection   Monitor lab/diagnostic results   Monitor all insertion sites i.e., indwelling lines, tubes and drains   Administer medications as ordered   Identify and instruct in appropriate isolation precautions for identified infection/condition   Instruct and encourage patient and family to use good hand hygiene technique     Problem: Safety - Adult  Goal: Free from fall injury  2025 by Nerissa Felix RN  Outcome: Progressing  Flowsheets (Taken 2025)  Free From Fall Injury:   Based on caregiver fall risk screen, instruct family/caregiver to ask 
  Problem: Vaginal Birth or  Section  Goal: Fetal and maternal status remain reassuring during the birth process  Description:  Birth OB-Pregnancy care plan goal which identifies if the fetal and maternal status remain reassuring during the birth process  Outcome: Progressing  Flowsheets (Taken 2025 110)  Fetal and Maternal Status Remain Reassuring During the Birth Process:   Monitor uterine activity   Monitor vital signs   Monitor fetal heart rate   Monitor labor progression (Vaginal delivery)     Problem: Postpartum  Goal: Experiences normal postpartum course  Description:  Postpartum OB-Pregnancy care plan goal which identifies if the mother is experiencing a normal postpartum course  Outcome: Progressing  Flowsheets (Taken 2025 1101)  Experiences Normal Postpartum Course:   Monitor maternal vital signs   Assess uterine involution  Goal: Appropriate maternal -  bonding  Description:  Postpartum OB-Pregnancy care plan goal which identifies if the mother and  are bonding appropriately  Outcome: Progressing  Goal: Establishment of infant feeding pattern  Description:  Postpartum OB-Pregnancy care plan goal which identifies if the mother is establishing a feeding pattern with their   Outcome: Progressing  Goal: Incisions, wounds, or drain sites healing without S/S of infection  Outcome: Progressing     Problem: Pain  Goal: Verbalizes/displays adequate comfort level or baseline comfort level  Outcome: Progressing  Flowsheets (Taken 2025 1101)  Verbalizes/displays adequate comfort level or baseline comfort level:   Encourage patient to monitor pain and request assistance   Assess pain using appropriate pain scale   Administer analgesics based on type and severity of pain and evaluate response   Implement non-pharmacological measures as appropriate and evaluate response   Consider cultural and social influences on pain and pain management   Notify Licensed Independent 
Progressing  Flowsheets  Taken 2/25/2025 0211 by Nerissa Hernandez RN  Discharge to home or other facility with appropriate resources: Identify barriers to discharge with patient and caregiver  Taken 2/24/2025 1959 by Nerissa Felix RN  Discharge to home or other facility with appropriate resources:   Identify barriers to discharge with patient and caregiver   Arrange for needed discharge resources and transportation as appropriate   Refer to discharge planning if patient needs post-hospital services based on physician order or complex needs related to functional status, cognitive ability or social support system     Problem: Chronic Conditions and Co-morbidities  Goal: Patient's chronic conditions and co-morbidity symptoms are monitored and maintained or improved  Outcome: Progressing  Flowsheets (Taken 2/24/2025 1959 by Nerissa Felix RN)  Care Plan - Patient's Chronic Conditions and Co-Morbidity Symptoms are Monitored and Maintained or Improved:   Monitor and assess patient's chronic conditions and comorbid symptoms for stability, deterioration, or improvement   Collaborate with multidisciplinary team to address chronic and comorbid conditions and prevent exacerbation or deterioration   Update acute care plan with appropriate goals if chronic or comorbid symptoms are exacerbated and prevent overall improvement and discharge   Plan of care discussed with mother and she contributes to goal setting and voices understanding of plan of care.

## 2025-02-26 NOTE — LACTATION NOTE
Met with pt in room.  Pt reports hx of low supply, thyroid labs off.  Reviewed info about hand expression, latch, production.  Educated about driving supply now and in early weeks to have best supply possible.  Pt knows about support available.

## 2025-02-27 ENCOUNTER — TELEPHONE (OUTPATIENT)
Dept: FAMILY MEDICINE CLINIC | Age: 30
End: 2025-02-27

## 2025-02-27 NOTE — TELEPHONE ENCOUNTER
Care Transitions Initial Follow Up Call    Outreach made within 2 business days of discharge: Yes    Patient: Margie Pandey Patient : 1995   MRN: [id number]  Reason for Admission: Birth  Discharge Date: 25       Spoke with: Margie Pandey    Discharge department/facility: [facility]    TCM Interactive Patient Contact:  Was patient able to fill all prescriptions: No: No new medications given at this time.   Was patient instructed to bring all medications to the follow-up visit: Yes  Is patient taking all medications as directed in the discharge summary? Yes  Does patient understand their discharge instructions: Yes  Does patient have questions or concerns that need addressed prior to 7-14 day follow up office visit: no    Additional needs identified to be addressed with provider  No needs identified             Scheduled appointment with PCP within 7-14 days    Follow Up  No future appointments.    Argenis Bauer LPN

## 2025-04-11 ENCOUNTER — LAB (OUTPATIENT)
Dept: LAB | Age: 30
End: 2025-04-11

## 2025-04-11 LAB
PROLACTIN SERPL-MCNC: 15.8 NG/ML
TSH SERPL DL<=0.05 MIU/L-ACNC: 1.16 UIU/ML (ref 0.27–4.2)

## 2025-06-26 NOTE — ED PROVIDER NOTES
No care due was identified.  [facility] Embedded Care Due Messages. Reference number: [id number].   6/26/2025 8:50:13 AM CDT   have NOT CHANGED    Details   ibuprofen (ADVIL;MOTRIN) 600 MG tablet Take 1 tablet by mouth every 6 hours as needed for Pain, Disp-30 tablet, R-1OTC      acetaminophen (AMINOFEN) 325 MG tablet Take 2 tablets by mouth every 6 hours as needed for Pain, Disp-120 tablet, R-3OTC      Prenatal MV-Min-Fe Fum-FA-DHA (PRENATAL 1 PO) Take by mouthHistorical Med             ALLERGIES     Patient is is allergic to cefdinir. FAMILY HISTORY     Patient'sfamily history includes Alzheimer's Disease in her paternal grandfather; Breast Cancer in her maternal grandmother and paternal grandmother; Cancer in her maternal grandmother; Cancer (age of onset: 61) in her paternal grandmother; Crohn's Disease in her father; Diabetes in her maternal grandfather; Heart Disease in her maternal grandfather and maternal grandmother; High Blood Pressure in her father and mother; Kidney Disease in her maternal grandfather; Thyroid Disease in her paternal grandfather. SOCIAL HISTORY     Patient  reports that she has quit smoking. Her smoking use included cigarettes. She has never used smokeless tobacco. She reports that she does not drink alcohol and does not use drugs. PHYSICAL EXAM     ED TRIAGE VITALS  BP: 115/74, Temp: 98 °F (36.7 °C), Heart Rate: (!) 122, Resp: 18, SpO2: 97 %  Physical Exam  Vitals and nursing note reviewed. Exam conducted with a chaperone present. Constitutional:       General: She is not in acute distress. Appearance: Normal appearance. HENT:      Head: Normocephalic and atraumatic. Right Ear: External ear normal.      Left Ear: External ear normal.      Nose: No congestion. Eyes:      General: No scleral icterus. Conjunctiva/sclera: Conjunctivae normal.   Pulmonary:      Effort: Pulmonary effort is normal. No respiratory distress. Chest:   Breasts:     Right: Tenderness present. No swelling, bleeding, inverted nipple, mass, nipple discharge or skin change. Left: Tenderness present.  No swelling, bleeding, inverted nipple, mass, nipple discharge or skin change. Musculoskeletal:      Cervical back: Normal range of motion. Skin:     General: Skin is warm and dry. Capillary Refill: Capillary refill takes less than 2 seconds. Coloration: Skin is not jaundiced. Findings: No rash. Neurological:      Mental Status: She is alert and oriented to person, place, and time. Sensory: Sensation is intact. Psychiatric:         Mood and Affect: Mood normal.         Behavior: Behavior normal. Behavior is cooperative. DIAGNOSTIC RESULTS   Labs: No results found for this visit on 09/24/22. IMAGING:  No orders to display     URGENT CARE COURSE:     Vitals:    09/24/22 0815   BP: 115/74   Pulse: (!) 122   Resp: 18   Temp: 98 °F (36.7 °C)   TempSrc: Temporal   SpO2: 97%   Weight: 240 lb (108.9 kg)       Medications - No data to display  PROCEDURES:  None  FINALIMPRESSION      1. Mastitis without abscess        DISPOSITION/PLAN   DISPOSITION Decision To Discharge 09/24/2022 08:35:53 AM  Patient presents with bilateral breast/nipple tenderness with breast-feeding. Consider simple mastitis versus early mastitis with infection. No abscess. Medication as prescribed. Discussed altered bowel alejandra. Follow-up with PCP/OB/GYN as needed. If any distress go to ER. PATIENT REFERRED TO:  Fabiola Ferrell MD  [address] [place] [address]  [phone]      Follow up as needed. Medication as prescribed. Follow up with OB as needed. If worse go to ER.     DISCHARGE MEDICATIONS:  Discharge Medication List as of 9/24/2022  8:37 AM        START taking these medications    Details   clindamycin (CLEOCIN) 300 MG capsule Take 1 capsule by mouth 3 times daily for 10 days, Disp-30 capsule, R-0Normal           Discharge Medication List as of 9/24/2022  8:37 AM          [address], APRN - CNP  09/24/22 1210

## (undated) DEVICE — PAD,SANITARY,11 IN,MAXI,W/WINGS,N-STRL: Brand: MEDLINE

## (undated) DEVICE — TUBING, SUCTION, 1/4" X 20', STRAIGHT: Brand: MEDLINE INDUSTRIES, INC.

## (undated) DEVICE — PAD,NON-ADHERENT,3X8,STERILE,LF,1/PK: Brand: MEDLINE

## (undated) DEVICE — TROCAR: Brand: KII SHIELDED BLADED ACCESS SYSTEM

## (undated) DEVICE — INSUFFLATION NEEDLE TO ESTABLISH PNEUMOPERITONEUM.: Brand: INSUFFLATION NEEDLE

## (undated) DEVICE — PUMP SUC IRR TBNG L10FT W/ HNDPC ASSEMB STRYKEFLOW 2

## (undated) DEVICE — BAG SPEC REM 224ML W4XL6IN DIA10MM 1 HND GYN DISP ENDOPCH

## (undated) DEVICE — SYRINGE MED 10ML LUERLOCK TIP W/O SFTY DISP

## (undated) DEVICE — STRIP,CLOSURE,WOUND,MEDI-STRIP,1/2X4: Brand: MEDLINE

## (undated) DEVICE — GAUZE,SPONGE,8"X4",12PLY,XRAY,STRL,LF: Brand: MEDLINE

## (undated) DEVICE — 3M™ STERI-STRIP™ COMPOUND BENZOIN TINCTURE 40 BAGS/CARTON 4 CARTONS/CASE C1544: Brand: 3M™ STERI-STRIP™

## (undated) DEVICE — BASIC SINGLE BASIN BTC-LF: Brand: MEDLINE INDUSTRIES, INC.

## (undated) DEVICE — PACK PROC LAP II AURORA

## (undated) DEVICE — GARMENT,MEDLINE,DVT,INT,CALF,MED, GEN2: Brand: MEDLINE

## (undated) DEVICE — Z DISCONTINUED BY MEDLINE USE 2711682 TRAY SKIN PREP DRY W/ PREM GLV

## (undated) DEVICE — KIT,ANTI FOG,W/SPONGE & FLUID,SOFT PACK: Brand: MEDLINE

## (undated) DEVICE — INTENDED FOR TISSUE SEPARATION, AND OTHER PROCEDURES THAT REQUIRE A SHARP SURGICAL BLADE TO PUNCTURE OR CUT.: Brand: BARD-PARKER ® CARBON RIB-BACK BLADES

## (undated) DEVICE — TROCAR: Brand: KII FIOS FIRST ENTRY

## (undated) DEVICE — Z DUPLICATE USE 2431315 SET INSUF TBNG HI FLO W/ SMK EVAC FOR PNEUMOCLEAR

## (undated) DEVICE — YANKAUER,BULB TIP,W/O VENT,RIGID,STERILE: Brand: MEDLINE

## (undated) DEVICE — LINER SUCT CANSTR 1500CC SEMI RIG W/ POR HYDROPHOBIC SHUT

## (undated) DEVICE — Z INACTIVE USE 2735373 APPLICATOR FBR LAIN COT WOOD TIP ECONOMICAL

## (undated) DEVICE — CATHETER,URETHRAL,REDRUBBER,STRL,14FR: Brand: MEDLINE INDUSTRIES, INC.

## (undated) DEVICE — GLOVE ORANGE PI 8 1/2   MSG9085

## (undated) DEVICE — PREP SOL PVP IODINE 4%  4 OZ/BTL

## (undated) DEVICE — GLOVE SURG SZ 65 THK91MIL LTX FREE SYN POLYISOPRENE

## (undated) DEVICE — PACK-MAJOR

## (undated) DEVICE — SOLUTION SURG PREP POV IOD 7.5% 4 OZ

## (undated) DEVICE — 1840 FOAM BLOCK NEEDLE COUNTER: Brand: DEVON

## (undated) DEVICE — ADHESIVE SKIN CLSR 0.7ML TOP DERMBND ADV

## (undated) DEVICE — HYPODERMIC SAFETY NEEDLE: Brand: MAGELLAN

## (undated) DEVICE — SUTURE VCRL + SZ 0 L27IN ABSRB VLT L26MM UR-6 5/8 CIR VCP603H

## (undated) DEVICE — SUTURE VCRL + SZ 4-0 L18IN ABSRB UD L19MM PS-2 3/8 CIR PRIM VCP496H

## (undated) DEVICE — COVER ARMBRD W13XL28.5IN IMPERV BLU FOR OP RM

## (undated) DEVICE — APPLICATOR MEDICATED 26 CC SOLUTION HI LT ORNG CHLORAPREP

## (undated) DEVICE — SEALER ENDOSCP L37CM NANO COAT BLNT TIP LAP DIV

## (undated) DEVICE — BANDAGE ADH W1XL3IN NAT FAB WVN FLX DURABLE N ADH PD SEAL

## (undated) DEVICE — GLOVE SURG SZ 6 THK91MIL LTX FREE SYN POLYISOPRENE ANTI

## (undated) DEVICE — SUTURE VCRL SZ 3-0 L27IN ABSRB UD FS-2 L19MM 1/2 CIR J423H